# Patient Record
Sex: FEMALE | Race: WHITE | HISPANIC OR LATINO | Employment: FULL TIME | URBAN - METROPOLITAN AREA
[De-identification: names, ages, dates, MRNs, and addresses within clinical notes are randomized per-mention and may not be internally consistent; named-entity substitution may affect disease eponyms.]

---

## 2017-01-03 ENCOUNTER — ALLSCRIPTS OFFICE VISIT (OUTPATIENT)
Dept: OTHER | Facility: OTHER | Age: 32
End: 2017-01-03

## 2017-01-18 ENCOUNTER — ALLSCRIPTS OFFICE VISIT (OUTPATIENT)
Dept: OTHER | Facility: OTHER | Age: 32
End: 2017-01-18

## 2017-01-31 ENCOUNTER — TRANSCRIBE ORDERS (OUTPATIENT)
Dept: ADMINISTRATIVE | Facility: HOSPITAL | Age: 32
End: 2017-01-31

## 2017-01-31 ENCOUNTER — ALLSCRIPTS OFFICE VISIT (OUTPATIENT)
Dept: OTHER | Facility: OTHER | Age: 32
End: 2017-01-31

## 2017-01-31 DIAGNOSIS — G93.5 COMPRESSION OF BRAIN (HCC): Primary | ICD-10-CM

## 2017-01-31 DIAGNOSIS — Z86.69 PERSONAL HISTORY OF OTHER DISEASES OF THE NERVOUS SYSTEM AND SENSE ORGANS: ICD-10-CM

## 2017-05-02 ENCOUNTER — HOSPITAL ENCOUNTER (OUTPATIENT)
Dept: RADIOLOGY | Facility: HOSPITAL | Age: 32
Discharge: HOME | End: 2017-05-02
Attending: NEUROLOGICAL SURGERY
Payer: COMMERCIAL

## 2017-05-02 DIAGNOSIS — Z86.69 PERSONAL HISTORY OF OTHER DISEASES OF THE NERVOUS SYSTEM AND SENSE ORGANS: ICD-10-CM

## 2017-05-02 PROCEDURE — 72141 MRI NECK SPINE W/O DYE: CPT

## 2017-05-02 PROCEDURE — 70551 MRI BRAIN STEM W/O DYE: CPT

## 2017-05-04 ENCOUNTER — TRANSCRIBE ORDERS (OUTPATIENT)
Dept: ADMINISTRATIVE | Facility: HOSPITAL | Age: 32
End: 2017-05-04

## 2017-05-04 ENCOUNTER — ALLSCRIPTS OFFICE VISIT (OUTPATIENT)
Dept: OTHER | Facility: OTHER | Age: 32
End: 2017-05-04

## 2017-05-04 DIAGNOSIS — G95.0 SYRINGOMYELIA AND SYRINGOBULBIA (HCC): Primary | ICD-10-CM

## 2017-05-04 DIAGNOSIS — G95.0 SYRINGOMYELIA AND SYRINGOBULBIA (HCC): ICD-10-CM

## 2017-07-27 ENCOUNTER — TRANSCRIBE ORDERS (OUTPATIENT)
Dept: ADMINISTRATIVE | Facility: HOSPITAL | Age: 32
End: 2017-07-27

## 2017-07-27 DIAGNOSIS — G95.0 SYRINGOMYELIA AND SYRINGOBULBIA (HCC): ICD-10-CM

## 2017-07-27 DIAGNOSIS — Z00.00 ENCOUNTER FOR GENERAL ADULT MEDICAL EXAMINATION WITHOUT ABNORMAL FINDINGS: ICD-10-CM

## 2017-07-27 DIAGNOSIS — H53.9 VISUAL DISTURBANCE: ICD-10-CM

## 2017-07-27 DIAGNOSIS — R20.9 DISTURBANCE OF SKIN SENSATION: ICD-10-CM

## 2017-07-27 DIAGNOSIS — R26.9 ABNORMALITY OF GAIT AND MOBILITY: ICD-10-CM

## 2017-07-27 DIAGNOSIS — Z00.00 ROUTINE GENERAL MEDICAL EXAMINATION AT A HEALTH CARE FACILITY: Primary | ICD-10-CM

## 2017-07-27 DIAGNOSIS — G93.5 COMPRESSION OF BRAIN (HCC): ICD-10-CM

## 2017-07-27 DIAGNOSIS — M54.2 CERVICALGIA: ICD-10-CM

## 2017-07-27 DIAGNOSIS — M54.9 DORSALGIA: ICD-10-CM

## 2017-08-02 ENCOUNTER — HOSPITAL ENCOUNTER (OUTPATIENT)
Dept: RADIOLOGY | Facility: HOSPITAL | Age: 32
Discharge: HOME | End: 2017-08-02
Payer: COMMERCIAL

## 2017-08-02 DIAGNOSIS — M54.2 CERVICALGIA: ICD-10-CM

## 2017-08-02 DIAGNOSIS — Z00.00 ROUTINE GENERAL MEDICAL EXAMINATION AT A HEALTH CARE FACILITY: ICD-10-CM

## 2017-08-02 PROCEDURE — 72040 X-RAY EXAM NECK SPINE 2-3 VW: CPT

## 2017-08-02 PROCEDURE — 70450 CT HEAD/BRAIN W/O DYE: CPT

## 2017-08-09 ENCOUNTER — ALLSCRIPTS OFFICE VISIT (OUTPATIENT)
Dept: OTHER | Facility: OTHER | Age: 32
End: 2017-08-09

## 2017-08-09 ENCOUNTER — TRANSCRIBE ORDERS (OUTPATIENT)
Dept: ADMINISTRATIVE | Facility: HOSPITAL | Age: 32
End: 2017-08-09

## 2017-08-09 DIAGNOSIS — G95.0 SYRINGOMYELIA AND SYRINGOBULBIA (HCC): Primary | ICD-10-CM

## 2017-08-09 DIAGNOSIS — G95.0 SYRINGOMYELIA AND SYRINGOBULBIA (HCC): ICD-10-CM

## 2017-08-09 DIAGNOSIS — G93.5 COMPRESSION OF BRAIN (HCC): Primary | ICD-10-CM

## 2017-08-21 ENCOUNTER — HOSPITAL ENCOUNTER (OUTPATIENT)
Dept: RADIOLOGY | Facility: HOSPITAL | Age: 32
Discharge: HOME | End: 2017-08-21
Attending: NEUROLOGICAL SURGERY
Payer: COMMERCIAL

## 2017-08-29 ENCOUNTER — GENERIC CONVERSION - ENCOUNTER (OUTPATIENT)
Dept: OTHER | Facility: OTHER | Age: 32
End: 2017-08-29

## 2017-11-06 DIAGNOSIS — G93.5 COMPRESSION OF BRAIN (HCC): ICD-10-CM

## 2017-11-22 ENCOUNTER — HOSPITAL ENCOUNTER (OUTPATIENT)
Dept: RADIOLOGY | Facility: HOSPITAL | Age: 32
Discharge: HOME | End: 2017-11-22
Attending: NEUROLOGICAL SURGERY
Payer: COMMERCIAL

## 2017-11-22 DIAGNOSIS — G95.0 SYRINGOMYELIA AND SYRINGOBULBIA (HCC): ICD-10-CM

## 2017-11-22 DIAGNOSIS — G93.5 COMPRESSION OF BRAIN (HCC): ICD-10-CM

## 2017-11-22 PROCEDURE — 72141 MRI NECK SPINE W/O DYE: CPT

## 2017-11-22 PROCEDURE — 70551 MRI BRAIN STEM W/O DYE: CPT

## 2017-11-24 ENCOUNTER — HOSPITAL ENCOUNTER (OUTPATIENT)
Dept: RADIOLOGY | Facility: HOSPITAL | Age: 32
Discharge: HOME | End: 2017-11-24
Attending: NEUROLOGICAL SURGERY
Payer: COMMERCIAL

## 2017-11-24 DIAGNOSIS — G93.5 COMPRESSION OF BRAIN (HCC): ICD-10-CM

## 2017-11-24 PROCEDURE — 72148 MRI LUMBAR SPINE W/O DYE: CPT

## 2017-11-24 PROCEDURE — 72146 MRI CHEST SPINE W/O DYE: CPT

## 2017-12-14 ENCOUNTER — GENERIC CONVERSION - ENCOUNTER (OUTPATIENT)
Dept: OTHER | Facility: OTHER | Age: 32
End: 2017-12-14

## 2017-12-14 DIAGNOSIS — G95.0 SYRINGOMYELIA AND SYRINGOBULBIA (HCC): ICD-10-CM

## 2018-01-10 NOTE — PROGRESS NOTES
Assessment    1  Syringomyelia (336 0) (G95 0)    Plan    · Methocarbamol 500 MG Oral Tablet; Take one tablet every 6 hours as needed for  muscle spasms   Rx By: Jason Perea; Dispense: 0 Days ; #:60 Tablet; Refill: 2; For: Syringomyelia; CLAIRE = N; Verified Transmission to [id number] Last Updated By: System, SureScripts; 5/4/2017 3:54:33 PM   · MethylPREDNISolone 4 MG Oral Tablet Therapy Pack; as directed   Rx By: Jason Perea; Dispense: 0 Days ; #:1 Tablet Therapy Pack; Refill: 1; For: Syringomyelia; CLAIRE = N; Verified Transmission to [id number] Last Updated By: System, SureScripts; 5/4/2017 3:54:34 PM   · *1 - [facility]  cervical ROM and upper extremity  strengthening  Status: Active  Requested for: 85GTQ9340   Ordered; For: Syringomyelia; Ordered By: Jason Perea Performed:  Due: [id number]  Care Summary provided  : Yes   · Follow-up visit in 6 months Evaluation and Treatment  Follow-up  Status: Complete   Done: [id number]   Ordered; For: Syringomyelia; Ordered By: Jason Perea Performed:  Due: 86HIZ7312; Last Updated By: Domenica Rivera; 5/4/2017 4:00:03 PM   · * MRI CERVICAL SPINE WO CONTRAST; Status:Need Information - Financial  Authorization; Requested HPE:[id number];    Perform:[facility]; Order Comments:11/02/2017 10:30am angela escobedo; IOC:[id number]; Last Updated By:Shabana Mike; 5/4/2017 4:03:17 PM;Ordered; For:Syringomyelia; Ordered By:Melchor Laurent;    Discussion/Summary    This is a 70-year-old female who clinically reports that she is worse in spite of her imaging studies which are improved  Neurologically she is intact and has an improved overall neurological examination  I've written for physical therapy as well as a Medrol Dosepak and muscle relaxants which may be helpful symptomatically for her  No further neurosurgical intervention is anticipated  Follow-up MRIs and imaging studies were ordered        Chief Complaint  Patient presents for 3 month follow up w/MRI Brain & Cspine; s/p Suboccipital decompressive craniectomy, C1 laminectomy and partial C2 laminectomy and duraplasty with placement of fourth ventricular to subarachnoid shunt by Dr Huey Adam on 12/14/16  History of Present Illness  Patient is a 77-year-old female with a Chiari malformation and a large holocord syringomyelia  She has gone through a previous Chiari decompression without C1 laminectomy on 9/16/11 by Dr Jt Peck and imaging studies demonstrate an adequate decompression  In addition to this the syrinx had not improved and she continued to be symptomatic via pain of this  It Is for these reasons that we recommended surgical intervention  12/14/16 - Patient underwent a Suboccipital decompressive craniectomy, C1 laminectomy and partial C2 laminectomy and duraplasty with placement of fourth ventricular to subarachnoid shunt by Dr Huey Adam  12/27/16 - One week status post suboccipital decompressive craniectomy, C1 laminectomy and partial C2 laminectomy, and duraplasty with placement of fourth ventricle to subarachnoid shunt  Patient returned with CT head 12/27/16, this was carefully reviewed in detail by Dr Sterling Carlisle and no ifeanyi abnormalities are identified  Her incision was oversewn and was placed on antibiiotics  12/29/16 - She reported her incision remained dry since we did the oversewn and reported that her positional headaches had become improved  1/3/17 - She returned with complains of swelling and pain at her incision, the right side, denies drainage, redness, or incisional pain  Also had complaint of progressively worsening tremors of the upper extremities  Taper gabapentin, current dose 200 mg 3 times daily, taper 200 mg twice daily for 4 days, then increased to 200 mg once daily Ã4 days then discontinue      1/18/17 - Patient seen 4 weeks post-op for removal of running 3-0 nylon suture, suture was used postoperatively to oversew superior one third of posterior cervical incision  Well-healed, sutures removed  She continues to take tramadol 50 mg one tablet typically at bedtime occasionally second tablet during the day, refill was provided one tablet every 8 hours as needed #40     1/31/17 - Patient seen for 6 weeks POV  Patient was doing well and continued to improve although she still had some symptoms  Dr Iman Metcalf recommended a slow return to normal activities including work (not more than 4hrs/day)  Recommended PT to help with the transition back to more normal activities  Continue follow up w/imaging to ensure the syrinx is getting smaller will be important in setting of a syringomyelia  Today, patient complaints of constant headaches, ringing in ears, constant neck pain, constant back pain, numbness/tingling in hands and legs, occasional weakness of arms/legs, and imbalance  Review of Systems    Constitutional: No fever, no chills, feels well, no tiredness, no recent weight gain or weight loss  Eyes: No complaints of eye pain, no red eyes, no eyesight problems, no discharge, no dry eyes, no itching of eyes  ENT: tinnitus, but as noted in HPI  Cardiovascular: No complaints of slow heart rate, no fast heart rate, no chest pain, no palpitations, no leg claudication, no lower extremity edema  Respiratory: No complaints of shortness of breath, no wheezing, no cough, no SOB on exertion, no orthopnea, no PND  Gastrointestinal: No complaints of abdominal pain, no constipation, no nausea or vomiting, no diarrhea, no bloody stools  Genitourinary: No complaints of dysuria, no incontinence, no pelvic pain, no dysmenorrhea, no vaginal discharge or bleeding  Musculoskeletal: No complaints of arthralgias, no myalgias, no joint swelling or stiffness, no limb pain or swelling  Integumentary: No complaints of skin rash or lesions, no itching, no skin wounds, no breast pain or lump     Neurological: headache, numbness, tingling and limb weakness, but as noted in HPI, no dizziness and no difficulty walking  Psychiatric: Not suicidal, no sleep disturbance, no anxiety or depression, no change in personality, no emotional problems  Endocrine: No complaints of proptosis, no hot flashes, no muscle weakness, no deepening of the voice, no feelings of weakness  Hematologic/Lymphatic: No complaints of swollen glands, no swollen glands in the neck, does not bleed easily, does not bruise easily  ROS reviewed  Active Problems    1  Acute cystitis without hematuria (595 0) (N30 00)   2  Acute intractable headache, unspecified headache type (784 0) (R51)   3  Cervicalgia (723 1) (M54 2)   4  Chiari I malformation (348 4) (G93 5)   5  Encounter for postoperative care (V58 49) (Z48 89)   6  History of cervical spinal surgery (V45 89) (Z98 890)   7  History of cranial surgery (V45 89) (Z98 890)   8  History of syringomyelocele (V12 49) (Z86 69)   9  Syringomyelia (336 0) (G95 0)    Past Medical History    The active problems and past medical history were reviewed and updated today  Surgical History    1  History of Back Surgery   2  History of Suboccipital Decompress Medulla & Spinal Cord, Dural Graft    The surgical history was reviewed and updated today  Family History  Sister    1  Family history of cardiac disorder (V17 49) (Z82 49)  Maternal Grandmother    2  Family history of cardiac disorder (V17 49) (Z82 49)  Maternal Grandfather    3  Family history of diabetes mellitus (V18 0) (Z83 3)  Paternal Grandfather    4  Family history of diabetes mellitus (V18 0) (Z83 3)  Family History    5  Family history of cardiac disorder (V17 49) (Z82 49)   6  Family history of diabetes mellitus (V18 0) (Z83 3)    The family history was reviewed and updated today         Social History    · Completed college, associates degree   · Employed   ·    · Never a smoker   · Non-smoker (V49 89) (Z78 9)   · Rarely consumes alcohol (V49 89) (Z78 9)   · Two children  The social history was reviewed and updated today  Current Meds   1  No Reported Medications Recorded    The medication list was reviewed and updated today  Allergies    1  Cephalexin CAPS    Vitals  Vital Signs    Recorded: [id number] 02:39PM   Temperature 98 9 F   Heart Rate 62   Respiration 16   Systolic 115   Diastolic 53   Height 5 ft 3 in   Weight 115 lb    BMI Calculated 20 37   BSA Calculated 1 53   Pain Scale 8     Physical Exam   (Incision is well healed  power is 5/5 in the upper Extemities  Station and gait are normal)   Mental Status: Alert and Oriented x3  Memory is intact  Attentive  Speech is articulate and fluent  Knowledge and vocabulary consistent with education  Grossly nonfocal   Judgment and insight: Normal     Mood and affect: Abnormal   angry  Cranial Nerve Exam:  2nd cranial nerve: Normal with no noted deficit  7th cranial nerve: Face symmetrical at grimace and at rest   8th cranial nerve: Grossly intact to finger rub bilaterally  11th cranial nerve: Shoulder shrug equal bilaterally  pain to shoulder shrug  Motor System - Upper Extremities: Muscle strength: 5/5 bilaterally  Motor System - Lower Extremities: Muscle strength: 5/5 bilaterally  Results/Data  Diagnostic Studies Reviewed Neurosur[facility]:   I personally reviewed the in detail with the patient  MRI Review MRI of the cervical spine is carefully reviewed and compared with the preoperative study  This demonstrates that the syringomyelia cavity is smaller than it had been and there is signal around the cord  The cervical medullary junction has been decompressed  FloSeal study demonstrates normal triple spinal fluid flow around the base  Future Appointments    Date/Time Provider Specialty Site   11/07/2017 02:00 PM Owen Cheadle, M D   Neurosurgery [facility]     Signatures   Electronically signed by : KALE Jc ; May 25 2017  5:18PM EST (Author)

## 2018-01-11 NOTE — PROGRESS NOTES
Assessment   1  Syringomyelia (336 0) (G95 0)  2  Chiari I malformation (348 4) (G93 5)  3  Cervicalgia (723 1) (M54 2)  4  Neck muscle spasm (728 85) (M62 838)  5  Back pain (724 5) (M54 9)  6  Sensory disturbance (782 0) (R20 9)  7  Visual disturbances (368 9) (H53 9)    Plan    · Follow Up After Tests Complete Evaluation and Treatment  Follow-up sovl / snplx after  MRI Brain/Cervical/Thoracic/Lumbar spine ;  Dr Saida Diaz eval;  ophthalmology eval  (patient will arrange visit with her established ophthalmologist)  Status: Hold For -  Scheduling  Requested for: [id number]  Ordered; For: Back pain, Cervicalgia, Chiari I malformation, Gait disturbance, Neck   muscle spasm, PMH: Suboccipital Decompress Medulla & Spinal Cord, Dural Graft,   Syringomyelia, Visual disturbances; Ordered By: Barbra Birmingham  Performed:     Due: [id number]    · [facility]  [facility] Co-Management  Eval  and treat  Patient with history of Chiari Malformation w/ syrinx  She is s/p suboccipital  decompressive craniectomy, C1 laminectomy, and partial C2 laminectomy and  duraplasty with placement of 4th ventricular to subarachnoid shunt  Status:  Hold For - Scheduling  Requested for: [id number]  Ordered; For: Back pain, Cervicalgia, Neck muscle spasm;  Ordered By: Barbra Birmingham  Performed:   Due: 82WJP4814; Last Updated By: Peter Madden; 8/9/2017   11:42:03 AM  () Care Summary provided  : Yes    · * MRI THORACIC SPINE WO CONTRAST; Status:Need Information - Financial  Authorization; Requested for:[id number];   Perform:[facility] Radiology; MQY:[id number]; Last Updated By:Shabana Mike;   8/9/2017 12:12:33 PM;Ordered; For:Back pain, Sensory disturbance, PMH: Suboccipital   Decompress Medulla & Spinal Cord, Dural Graft, Syringomyelia; Ordered By:Larry Dominguez Adjutant;    · * MRI CERVICAL SPINE WO CONTRAST; Status:Need Information - Financial  Authorization;  Requested for:[id number]; Perform:[facility] Radiology; IDH:[id number]; Last Updated By:Shabana Mike;   8/9/2017 12:14:15 PM;Ordered; For:Cervicalgia, Chiari I malformation, Sensory   disturbance, PMH: Suboccipital Decompress Medulla & Spinal Cord, Dural Graft,   Syringomyelia; Ordered By:Edda Dominguez;    · * MRI BRAIN WO CONTRAST; Status:Need Information - Financial Authorization; Requested for:[id number];   Perform:[facility] Radiology; XNM:[id number]; Last Updated By:Shabana Mike;   8/9/2017 12:13:32 PM;Ordered; For:Chiari I malformation, PMH: Suboccipital   Decompress Medulla & Spinal Cord, Dural Graft, Visual disturbances; Ordered   By:Edda Dominguez;    · * MRI LUMBAR SPINE WO CONTRAST; Status:Need Information - Financial  Authorization; Requested for:[id number];   Perform:[facility] Radiology; QCF:[id number]; Last Updated By:Shabana Mike;   8/9/2017 12:13:03 PM;Ordered; For:Gait disturbance, Sensory disturbance, PMH:   Suboccipital Decompress Medulla & Spinal Cord, Dural Graft, Syringomyelia; Ordered By:Edda Dominguez;    Patient will arrange follow up with her established ophthalmologist for history of visual disturbances      Discussion/Summary    This is a 28year old female with c/o of neck pain, back pain, intermittent visual disturbances, intermittent sensory disturbances of upper extremities and lower extremities, and balance disturbances  She is s/p a suboccipital decompressive craniectomy, C1 laminectomy, and partial C2 laminectomy and duraplasty w/ placement of 4th ventricular to subarachnoid shunt performed by Dr Nighat Munoz on 12/14/16 for Chiari Malformation and syringomyelia  CT head and c-spine xray was reviewed with Ms Jarret Perez  CT head (8/2/17) demonstrates stable CT of the brain  Shunt extending into the 4th ventricle reported unchanged in position  Ventricles normal for age  X-ray of C-spine (8/2/17) reports no acute fracture or traumatic malalignment   There is mild reversal of the cervical lordosis  To further evaluate visual disturbances recommend she undergo MRI Brain and Ophthalmology evaluation  I offered to provide patient with referral for Ophthalmology evaluation but she reports she will contact her previously established ophthalmologist and arrange ophthalmology evaluation herself  In the setting of neck / back pain, sensory disturbance of upper extremities and lower extremities and c/o of balance difficulty and her history of Chiari Malformation and syringomyelia recommend she undergo MRI of spinal axis  This would allow to evaluate if there is any progression of syrinx  She does have presence of muscle spasm of her paracervical / trapezius musculature on examination which may be contributing to her neck pain  She reports she has tried an otc Aleve  She has tried Methocarbamol but she reports it has made her sleepy  She reports she completed about 4 weeks of home PT  Heat / cold minimally takes the edge off her pain  Recommend consultation with Dr Ashwin Bell of Physiatry to evaluate for additional conservative treatment options for her pain  She is advised to follow up in our office after completion of requested imaging studies and consultations  Ms Yo Lino is advised to contact our office or present to ER if she experiences neurological change  Ms Yo Lino expressed understanding and agreement  The patient was counseled regarding diagnostic results, instructions for management, impressions  The patient has the current Goals: Improve neck pain  The patent has the current Barriers: Muscle spasm  Chief Complaint  Patient presents for review CT head and c-spine xray      History of Present Illness  Ms Yo Lino is a 28year old female  who is s/p suboccipital decompressive craniectomy, C1 laminectomy, and partial C2 laminectomy and duraplasty w/ placement of 4th ventricular to subarachnoid shunt performed by Dr Alex Scott on 12/14/16 for Chiari Malformation and syringomyelia  She was last seen in the office with Dr Jordan Sebastian on 5/4/17 at which juncture she was placed on Methylprednisolone dose pack, Methocarbamol, referred for Physical therapy, and advised follow up in 6 months with MRI C-spine  She presents to office for re-evaluation pursuant to phone call to office  She has contacted the office w/ c/o of painful and stiff neck and blurry / hazy vision of eyes  She had requested CT head and C-spine xray completed  Ms Lillard Landau reports ongoing neck pain  Neck pain is located right paracervical / right trapezius region  She describes a constant pulling neck pain  She currently rates neck pain 8/10 on pain scale  She reports her neck pain is aggravated when laying down  She reports utilizing heat / cold minimally takes the edge off her pain  She reports occasional pain into her right shoulder and just below in proximal right arm  She has no right arm pain currently  She is unaware of a particular trigger for her arm pain  She reports intermittent numbness in both hands that she reports occurs randomly when working  She reports she had this prior to surgery as well  She denies any numbness or tingling in her upper extremities currently  She reports subjective stable weakness of her arms in interval since last visit  She reports constant pain in the middle of her back  She reports intermittent numbness / tingling of her legs distal to her knees  She is unaware of a particular triggering activity or position and resolves without intervention  She does not describe any focal weakness of her lower extremities  She reports she still has difficulty with her balance although notes this has been stable  She ambulated independent  She reports she completed the Methylprednisolone dose pack after last visit with some relief of her neck pain  She reports she takes the Methocarbamol infrequently as it makes her tired     She reports haven taken otc Aleve  She reports she completed approximately 4 weeks of home physical therapy that was reportedly directed by a friends mother who is a Physical therapist     She reports she experiences intermittent blurry vision (both eyes) describes as feeling like she has a haze or water in eyes  She reports this has occurred a few times over the past couple of weeks  The blurry vision last for about a minutes and resolves on it's own without intervention  She denies diplopia  She denies seeing an ophthalmologist recently  She reports she is due to arrange ophthalmology follow up  She denies bladder or bowel dysfunction  Review of Systems    Constitutional: feeling tired, but no fever, not feeling poorly, no recent weight gain, no chills and no recent weight loss  Eyes: no dryness of the eyes, eyes not red, no purulent discharge from the eyes and no itching of the eyes    The patient presents with complaints of occasional episodes of eye pain  The patient presents with complaints of eyesight problems, described as blurry vision  ENT: pressure in both ears, but no earache, no nosebleeds, no sore throat, no hearing loss, no nasal discharge and no hoarseness  Cardiovascular: No complaints of slow heart rate, no fast heart rate, no chest pain, no palpitations, no leg claudication, no lower extremity edema  Respiratory: No complaints of shortness of breath, no wheezing, no cough, no SOB on exertion, no orthopnea, no PND  Gastrointestinal: No complaints of abdominal pain, no constipation, no nausea or vomiting, no diarrhea, no bloody stools  Genitourinary: No complaints of dysuria, no incontinence, no pelvic pain, no dysmenorrhea, no vaginal discharge or bleeding  Musculoskeletal: No complaints of arthralgias, no myalgias, no joint swelling or stiffness, no limb pain or swelling  Integumentary: No complaints of skin rash or lesions, no itching, no skin wounds, no breast pain or lump     Neurological: headache, numbness (intermittent bilateral arm and leg numbness), tingling (intermittent bilateral arm and leg tingling) and limb weakness (left hand ), but no confusion, no convulsions, no fainting and no difficulty walking    The patient presents with complaints of occasional episodes of dizziness  Psychiatric: Not suicidal, no sleep disturbance, no anxiety or depression, no change in personality, no emotional problems  Endocrine: No complaints of proptosis, no hot flashes, no muscle weakness, no deepening of the voice, no feelings of weakness  Hematologic/Lymphatic: No complaints of swollen glands, no swollen glands in the neck, does not bleed easily, does not bruise easily  ROS reviewed  Active Problems   1  Acute cystitis without hematuria (595 0) (N30 00)  2  Acute intractable headache, unspecified headache type (784 0) (R51)  3  Cervicalgia (723 1) (M54 2)  4  Chiari I malformation (348 4) (G93 5)  5  Encounter for postoperative care (V58 49) (Z48 89)  6  History of cervical spinal surgery (V45 89) (Z98 890)  7  History of cranial surgery (V45 89) (Z98 890)  8  History of syringomyelocele (V12 49) (Z86 69)  9  Syringomyelia (336 0) (G95 0)    Past Medical History    The active problems and past medical history were reviewed and updated today  Surgical History   1  History of Back Surgery  2  History of Suboccipital Decompress Medulla & Spinal Cord, Dural Graft    The surgical history was reviewed and updated today  Family History  Sister   1  Family history of cardiac disorder (V17 49) (Z82 49)  Maternal Grandmother   2  Family history of cardiac disorder (V17 49) (Z82 49)  Maternal Grandfather   3  Family history of diabetes mellitus (V18 0) (Z83 3)  Paternal Grandfather   4  Family history of diabetes mellitus (V18 0) (Z83 3)  Family History   5  Family history of cardiac disorder (V17 49) (Z82 49)  6   Family history of diabetes mellitus (V18 0) (Z83 3)    The family history was reviewed and updated today  Social History    · Completed college, associates degree   · Employed   ·    · Never a smoker   · Non-smoker (V49 89) (Z78 9)   · Rarely consumes alcohol (V49 89) (Z78 9)   · Two children  The social history was reviewed and updated today  Current Meds  1  Methocarbamol 500 MG Oral Tablet; Take one tablet every 6 hours as needed for muscle   spasms; Therapy: 62CAJ9574 to (Last PW:92TUQ7292)  Requested for: 87XQS7415 Ordered    The medication list was reviewed and updated today  Allergies   1  Cephalexin CAPS    Vitals  Vital Signs    Recorded: 09Aug2017 10:10AM   Temperature 98 F, Tympanic   Heart Rate 62   Respiration 16   Systolic 886, LUE, Sitting   Diastolic 60, LUE, Sitting   Height 5 ft 3 in   Weight 115 lb    BMI Calculated 20 37   BSA Calculated 1 53     Physical Exam     Constitutional Patient appears healthy and well developed  No signs of acute distress present  Musculo: Spine   Spine:    Cervical Spine examination demonstrates Cervical Spine: Tenderness: level inferior cervical spine, left paraspinal, right paraspinal, left trapezius muscle and right trapezius muscle  Palpatory findings include bilateral muscle spasms  (Slight reversal of cervical lordosis)  Skin Mature suboccipital / posterior cervical scar  Neurologic - Mental Status: Alert and Oriented x3  Speech is articulated and fluent  Grossly nonfocal     Cranial Nerve Exam:  2nd cranial nerve: Visual field was full to confrontation  (PERRLA)   3rd, 4th, and 6th cranial nerves: Normal with no deficit  5th CN: Sensation to LT intact b/l V1-V3  Masseter intact b/l  7th cranial nerve: Face symmetrical at grimace and at rest  8th cranial nerve: Grossly intact to finger rub bilaterally  9th and 10th cranial nerves: Uvula is midline  11th cranial nerve: Shoulder shrug equal bilaterally  12th cranial nerve: Tongue mideline, no atrophy present    Motor System - Upper Extremities: Normal to inspection and palpation  Strength: Deltoids 5/5 bilaterally  Biceps 5/5 bilaterally  Triceps 5/5 bilaterally  Extensor carpi radials is 5/5 bilaterally  Extensor digitorum 5/5 bilaterally  Intrinsic 5/5 bilaterally   5/5 bilaterally  Motor System - Lower Extremities: Normal to inspection and palpation  Strength: iliopsoas 5/5 bilaterally  Quadriceps 5/5 bilaterally  Hamstrings 5/5 bilaterally  Gastrocnemius 5/5 bilaterally  Anterior tibialis 5/5 bilaterally  EHL 5/5 bilaterally  Reflexes: Biceps +2 bilaterally  Brachioradialis +1-2 bilaterally  Triceps +1 bilaterally  Patellar left +2 and right +1-2  Achilles +2 bilaterally  No ankle clonus bilaterally  Toes downgoing bilaterally on Babinski  Coordination: No pronator drift  Finger to nose intact bilaterally  EMILE intact fingers and hands bilaterally  Heel down shin smooth / symmetric  Involuntary movements: None   Sensory: Patient reports sensation to pinprick is diminished of lateral left forearm and all fingers left hand compared to RUE  Sensation to pinprick intact of proximal LUE and medial left forearm  Sensation to pinprick intact of RUE  Sensation to pinprick intact of torso and lower extremities bilaterally  JPS and Vibratory sense intact bilateral thumbs and great toes  Gait and Station: Routine gait is normal  Independent  She has shaking of both legs when preforming tandem walking although was able to complete  Results/Data  Diagnostic Studies Reviewed Neurosur[facility]:   I personally reviewed the CT head and C-spine xray in detail with the patient  * XR SPINE CERVICAL 2 OR 3 VW INJURY [id number] 01:57PM Belita Jewels Order Number: [id number]   Performing Comments: Upright AP/LAT cervical spine     Test Name Result Flag Reference   XR SPINE CERVICAL 2 OR 3 VW (Report)     CERVICAL SPINE     INDICATION: Neck pain       COMPARISON: None     VIEWS: AP and lateral     IMAGES: 2     FINDINGS:   There is mild reversal of the cervical lordosis above C7  No evidence of fracture or subluxation  The intervertebral disc spaces are preserved  The prevertebral soft tissues are within normal limits  The lung apices are intact  IMPRESSION:     No acute fracture or traumatic subluxation  Mild reversal of the cervical lordosis  Workstation performed: [id number]     Signed by:   Ciara Marx MD   8/8/17     * CT HEAD WO CONTRAST [id number] 01:51PM Benigno Lopez Benry     Test Name Result Flag Reference   CT HEAD WO CONTRAST (Report)     CT BRAIN - WITHOUT CONTRAST     INDICATION: Right-sided neck swelling and stiffness  COMPARISON: 12/27/2016, CT  MRI dated 5/2/2017  TECHNIQUE: CT examination of the brain was performed  In addition to axial images, coronal reformatted images were created and submitted for interpretation  Radiation dose length product (DLP) for this visit: 958 1 mGy-cm   This examination, like all CT scans performed in the [facility], was performed utilizing techniques to minimize radiation dose exposure, including the use of iterative    reconstruction and automated exposure control  IMAGE QUALITY: Diagnostic  FINDINGS:      PARENCHYMA: Normal cerebral hemispheres  No hemorrhage  No mass, mass effect or midline shift  Brainstem and cerebellum demonstrate normal density  Normal basilar cisterns  There is a shunt catheter extending into the 4th ventricle from an inferior approach  Patient has undergone previous inferior occipital craniectomy within the midline  There is no parenchymal hemorrhage  No signs of acute infarction  VENTRICLES AND EXTRA-AXIAL SPACES: Normal for patient's age  VISUALIZED ORBITS AND PARANASAL SINUSES: Unremarkable  CALVARIUM AND EXTRACRANIAL SOFT TISSUES: See above description of suboccipital craniotomy  No extra-axial fluid collections  IMPRESSION:     Stable CT of the brain   Shunt extending into the 4th ventricle, unchanged in position  Workstation performed: [id number]     Signed by:   Massiel Reno DO   8/3/17     Future Appointments    Date/Time Provider Specialty Site   11/07/2017 02:00 PM Owen Cheadle, M D  Neurosurgery [facility] NEUROSURGICAL [facility]     Signatures   Electronically signed by : ISAIAH Reddy;  Aug 10 2017  6:46AM EST                       (Author)    Electronically signed by : KALE Jc ; Aug 10 2017  8:42AM EST                       (Author)

## 2018-01-11 NOTE — MISCELLANEOUS
Message   Recorded as Task   Date: 11/02/2016 10:06 AM, Created By: Denny Mckeon   Task Name: Miscellaneous   Assigned To: Bia Taylor   Regarding Patient: Beata Spencer, Status: Active   Comment:    Bia Taylor - 02 Nov 2016 10:06 AM     TASK CREATED  Pt was concerned that her upcoming surgery will be during her menstrual cycle  she is concerned that her surgery would be cancelled  Discussed with KDM and pt was reassured that this should have no impact on her surgery  Hygiene for this will be handled once she is in the hosp  She stated an understanding          Signatures   Electronically signed by : Juve Lipscomb, ; Nov 2 2016 10:06AM EST                       (Author)

## 2018-01-12 VITALS
SYSTOLIC BLOOD PRESSURE: 103 MMHG | HEIGHT: 63 IN | TEMPERATURE: 98.2 F | BODY MASS INDEX: 20.02 KG/M2 | RESPIRATION RATE: 14 BRPM | DIASTOLIC BLOOD PRESSURE: 60 MMHG | HEART RATE: 105 BPM | WEIGHT: 113 LBS

## 2018-01-13 VITALS
SYSTOLIC BLOOD PRESSURE: 101 MMHG | DIASTOLIC BLOOD PRESSURE: 53 MMHG | BODY MASS INDEX: 20.38 KG/M2 | HEIGHT: 63 IN | TEMPERATURE: 98.9 F | RESPIRATION RATE: 16 BRPM | WEIGHT: 115 LBS | HEART RATE: 62 BPM

## 2018-01-13 VITALS
TEMPERATURE: 98.9 F | BODY MASS INDEX: 20.2 KG/M2 | HEIGHT: 63 IN | DIASTOLIC BLOOD PRESSURE: 80 MMHG | RESPIRATION RATE: 16 BRPM | HEART RATE: 109 BPM | WEIGHT: 114 LBS | SYSTOLIC BLOOD PRESSURE: 105 MMHG

## 2018-01-13 NOTE — MISCELLANEOUS
Message  11/16/16; 12:45 PM, spoke with patient by phone, reported to her that her urinalysis was abnormal and had findings consistent with cystitis  On questioning her she does admit to frequency urination recently she denied burning urgency and hesitancy  She reports 2 episodes of UTI in the last one year  Advised her that a prescription for Cipro times 5 days would be called to her pharmacy ([facility]) she should start that today, in addition suggested she have a repeat urinalysis in approximately 10 days after the antibiotic completed she expressed understanding and agreement with these instructions and agreed to proceed  Plan  Acute cystitis without hematuria    · Ciprofloxacin HCl - 250 MG Oral Tablet; TAKE 1 TABLET EVERY 12 HOURS DAILY   · (1) URINALYSIS w URINE C/S REFLEX (will reflex a microscopy if leukocytes, occult  blood, or nitrites are not within normal limits); Status:Active;  Requested for:10Mlk1694;     Signatures   Electronically signed by : Taylor Rehman, [facility]; Nov 16 2016 12:47PM EST                       (Author)

## 2018-01-13 NOTE — MISCELLANEOUS
Message  S/w pt scheduled for surgery 12/14/16 on telephone  Verified allergies and went over pre-op instructions, including hibiclens bathing and NPO status  Pt currently denies taking any blood thinning medications  Answered any questions she may have had at this time  Patient asked if it was okay to dye her hair today  I advised against it and told her to wait until after surgery once she is cleared by the surgeon  She verbalized understanding  Active Problems    1  Acute cystitis without hematuria (595 0) (N30 00)   2  Cervicalgia (723 1) (M54 2)   3  Chiari I malformation (348 4) (G93 5)   4  History of cervical spinal surgery (V45 89) (Z98 890)   5  History of cranial surgery (V45 89) (Z98 890)   6  Syringomyelia (336 0) (G95 0)    Allergies    1   No Known Drug Allergies    Signatures   Electronically signed by : Clare Siegel RN; Dec 13 2016 11:33AM EST                       (Author)

## 2018-01-14 VITALS
BODY MASS INDEX: 20.38 KG/M2 | WEIGHT: 115 LBS | HEIGHT: 63 IN | DIASTOLIC BLOOD PRESSURE: 60 MMHG | SYSTOLIC BLOOD PRESSURE: 102 MMHG | RESPIRATION RATE: 16 BRPM | HEART RATE: 62 BPM | TEMPERATURE: 98 F

## 2018-01-15 NOTE — PROGRESS NOTES
Assessment    1  Chiari I malformation (348 4) (G93 5)   2  History of syringomyelocele (V12 49) (Z86 69)    Plan  History of syringomyelocele    · *1 - [facility]  Consult re  cervical range of motion and  strengthening - very slow progression  Status: Active  Requested for: [id number]   Ordered; For: History of syringomyelocele; Ordered By: Pilar Bergeron Performed:  Due: 04ATN0936  Care Summary provided  : Yes   · * MRI BRAIN WO CONTRAST; Status:Need Information - Financial Authorization; Requested MMO:[id number];    Perform:[facility]; PWS:[id number]; Ordered;  For:History of syringomyelocele; Ordered By:Garry Laurent;   · * MRI CERVICAL SPINE WO CONTRAST; Status:Need Information - Financial  Authorization; Requested DDN:[id number];    Perform:[facility]; DQK:[id number]; Ordered;  For:History of syringomyelocele; Ordered By:Garry Laurent;   · Follow-up visit in 3 months Evaluation and Treatment  Follow-up  Status: Hold For -  Scheduling  Requested for: [id number]   Ordered; For: History of syringomyelocele; Ordered By: Pilar Bergeron Performed:  Due: 34TUS3364    Discussion/Summary    59-year-old female status post revision Chiari decompression and image of a syrinx    She in general is doing very well and continues to improve although she still has some symptoms  I've recommended that she slowly return to Normal activities including working not more than 4 hours a day  Physical therapy will help in this transition back to more normal activities  Continued follow-up with imaging studies to ensure that the syrinx is getting smaller is critically important in the setting of a syringomyelia        Chief Complaint  Patient presents 6 weeks post-op s/p Suboccipital decompressive craniectomy, C1 laminectomy and partial C2 laminectomy and duraplasty with placement of fourth ventricular to subarachnoid shunt by Dr De Guzman Session on 12/14/16      Post-Op  Post-Op Crani-Brain: Karl Chan is status post  Patient presents 6 weeks post-op s/p Suboccipital decompressive craniectomy, C1 laminectomy and partial C2 laminectomy and duraplasty with placement of fourth ventricular to subarachnoid shunt  History of Present Illness: Patient is a 19-year-old female with a Chiari malformation and a large holocord syringomyelia  She has gone through a previous Chiari decompression without C1 laminectomy imaging studies demonstrate an adequate decompression  In addition to this the syrinx had not improved and she continued to be symptomatic via pain of this  It Is for these reasons that I've recommended the following surgical intervention: Repeat suboccipital craniectomy and C1 laminectomy and duraplasty and placement of fourth ventricular-subarachnoid shunt  12/27/16 - One week status post suboccipital decompressive craniectomy, C1 laminectomy and partial C2 laminectomy, and duraplasty with placement of fourth ventricle to subarachnoid shunt  Patient returned with CT head 12/27/16, this was carefully reviewed in detail by Dr Sofía Medel and no ifeanyi abnormalities are identified  Her incision was oversewn and was placed on antibiiotics  12/29/16 - She reported her incision remained dry since we did the oversewn and reported that her positional headaches had become improved  1/3/17 - She returned with complains of swelling and pain at her incision, the right side, denies drainage, redness, or incisional pain  Also had complaint of progressively worsening tremors of the upper extremities  Taper gabapentin, current dose 200 mg 3 times daily, taper 200 mg twice daily for 4 days, then increased to 200 mg once daily Ã4 days then discontinue  1/18/17 - Patient seen 4 weeks post-op for removal of running 3-0 nylon suture, suture was used postoperatively to oversew superior one third of posterior cervical incision  Well-healed, sutures removed   She continues to take tramadol 50 mg one tablet typically at bedtime occasionally second tablet during the day, refill was provided one tablet every 8 hours as needed #40  The patient reports vertigo, upper extremity weakness, lower extremity weakness and ringing in left ear, hearing loss in left ear, nausea, numbness and tingling in hands and legs, weakness of upper and lower extremities, and imbalance  She does report that she continues to improve but very slowly  , but no speech disturbances, no visual complaints, no neck stiffness, no fever, no facial weakness, no cognitive difficulties, no wound drainage, no ataxia and no photophobia    The patient presents with complaints of occasional episodes of dizziness, described as lightheadedness and vertigo  The patient presents with complaints of entire head headache (never in the same area)   Physical Examination:   Test Results:   Assessment:   Plan:        Review of Systems    Constitutional: feeling poorly  Eyes: No complaints of eye pain, no red eyes, no eyesight problems, no discharge, no dry eyes, no itching of eyes  ENT: hearing loss and tinnitus, but as noted in HPI  Cardiovascular: No complaints of slow heart rate, no fast heart rate, no chest pain, no palpitations, no leg claudication, no lower extremity edema  Respiratory: No complaints of shortness of breath, no wheezing, no cough, no SOB on exertion, no orthopnea, no PND  Gastrointestinal: No complaints of abdominal pain, no constipation, no nausea or vomiting, no diarrhea, no bloody stools  Genitourinary: No complaints of dysuria, no incontinence, no pelvic pain, no dysmenorrhea, no vaginal discharge or bleeding  Musculoskeletal: No complaints of arthralgias, no myalgias, no joint swelling or stiffness, no limb pain or swelling  Integumentary: No complaints of skin rash or lesions, no itching, no skin wounds, no breast pain or lump     Neurological: headache, numbness, tingling, dizziness, limb weakness and difficulty walking, but as noted in HPI  Psychiatric: Not suicidal, no sleep disturbance, no anxiety or depression, no change in personality, no emotional problems  Endocrine: No complaints of proptosis, no hot flashes, no muscle weakness, no deepening of the voice, no feelings of weakness  Hematologic/Lymphatic: No complaints of swollen glands, no swollen glands in the neck, does not bleed easily, does not bruise easily  Active Problems    1  Acute cystitis without hematuria (595 0) (N30 00)   2  Acute intractable headache, unspecified headache type (784 0) (R51)   3  Cervicalgia (723 1) (M54 2)   4  Chiari I malformation (348 4) (G93 5)   5  Encounter for postoperative care (V58 49) (Z48 89)   6  History of cervical spinal surgery (V45 89) (Z98 890)   7  History of cranial surgery (V45 89) (Z98 890)   8  History of syringomyelocele (V12 49) (Z86 69)   9  Syringomyelia (336 0) (G95 0)    Social History    · Completed college, associates degree   · Employed   ·    · Never a smoker   · Non-smoker (V49 89) (Z78 9)   · Rarely consumes alcohol (V49 89) (Z78 9)   · Two children    Current Meds   1  TraMADol HCl - 50 MG Oral Tablet; TAKE 1 TABLET Every 8 hours PRN; Therapy: 22UVV6008 to (Evaluate:20Cmh2577); Last Rx:18Jan2017 Ordered    Allergies    1  Cephalexin CAPS    Vitals   Recorded: 19NDT9146 09:36AM   Temperature 97 9 F   Heart Rate 88   Respiration 16   Systolic 349   Diastolic 66   Height 5 ft 3 in   Weight 114 lb    BMI Calculated 20 19   BSA Calculated 1 52   Pain Scale 6     Physical Exam   (Incision is clean and dry and healing well  )   Mental Status: Alert and Oriented x3  Memory is intact  Attentive  Speech is articulate and fluent  Knowledge and vocabulary consistent with education    Grossly nonfocal   Judgment and insight: Normal   Mood and affect: Normal        Signatures   Electronically signed by : KALE Cox ; Jan 31 2017 10:39AM EST                       (Author)

## 2018-01-19 ENCOUNTER — GENERIC CONVERSION - ENCOUNTER (OUTPATIENT)
Dept: OTHER | Facility: OTHER | Age: 33
End: 2018-01-19

## 2018-01-22 VITALS
HEIGHT: 63 IN | TEMPERATURE: 97.9 F | BODY MASS INDEX: 20.2 KG/M2 | RESPIRATION RATE: 16 BRPM | SYSTOLIC BLOOD PRESSURE: 118 MMHG | DIASTOLIC BLOOD PRESSURE: 66 MMHG | WEIGHT: 114 LBS | HEART RATE: 88 BPM

## 2018-01-23 NOTE — MISCELLANEOUS
Message  Return to work or school:   Tio Patrick is under my professional care  She was seen in my office on 12/14/17     She is able to work with limitations (light duty)  Weight Bearing Status: Weight-Bearing As Tolerated           Signatures   Electronically signed by : Maria Isabel Hill, ; Jan 19 2018  3:53PM EST                       (Author)

## 2018-01-24 VITALS
WEIGHT: 112.25 LBS | BODY MASS INDEX: 19.89 KG/M2 | HEIGHT: 63 IN | TEMPERATURE: 98.9 F | SYSTOLIC BLOOD PRESSURE: 102 MMHG | RESPIRATION RATE: 16 BRPM | HEART RATE: 60 BPM | DIASTOLIC BLOOD PRESSURE: 58 MMHG

## 2018-10-15 ENCOUNTER — OFFICE VISIT (OUTPATIENT)
Dept: GYNECOLOGY | Facility: CLINIC | Age: 33
End: 2018-10-15
Payer: COMMERCIAL

## 2018-10-15 VITALS
SYSTOLIC BLOOD PRESSURE: 102 MMHG | WEIGHT: 120.2 LBS | HEIGHT: 63 IN | BODY MASS INDEX: 21.3 KG/M2 | DIASTOLIC BLOOD PRESSURE: 58 MMHG

## 2018-10-15 DIAGNOSIS — Z86.69 HISTORY OF CHIARI MALFORMATION: ICD-10-CM

## 2018-10-15 DIAGNOSIS — Z11.3 ROUTINE SCREENING FOR STI (SEXUALLY TRANSMITTED INFECTION): ICD-10-CM

## 2018-10-15 DIAGNOSIS — N89.8 VAGINAL DISCHARGE: ICD-10-CM

## 2018-10-15 DIAGNOSIS — Z01.419 ENCOUNTER FOR ANNUAL ROUTINE GYNECOLOGICAL EXAMINATION: Primary | ICD-10-CM

## 2018-10-15 DIAGNOSIS — N92.6 IRREGULAR MENSES: ICD-10-CM

## 2018-10-15 PROCEDURE — S0610 ANNUAL GYNECOLOGICAL EXAMINA: HCPCS | Performed by: OBSTETRICS & GYNECOLOGY

## 2018-10-15 PROCEDURE — 87210 SMEAR WET MOUNT SALINE/INK: CPT | Performed by: OBSTETRICS & GYNECOLOGY

## 2018-10-15 PROCEDURE — 87591 N.GONORRHOEAE DNA AMP PROB: CPT | Performed by: OBSTETRICS & GYNECOLOGY

## 2018-10-15 PROCEDURE — 87491 CHLMYD TRACH DNA AMP PROBE: CPT | Performed by: OBSTETRICS & GYNECOLOGY

## 2018-10-15 PROCEDURE — G0145 SCR C/V CYTO,THINLAYER,RESCR: HCPCS | Performed by: PATHOLOGY

## 2018-10-15 PROCEDURE — 87624 HPV HI-RISK TYP POOLED RSLT: CPT | Performed by: OBSTETRICS & GYNECOLOGY

## 2018-10-15 PROCEDURE — G0124 SCREEN C/V THIN LAYER BY MD: HCPCS | Performed by: PATHOLOGY

## 2018-10-15 NOTE — PROGRESS NOTES
Assessment/Plan:  NGE  BCM- trying to conceive for 6 mos  Irregular cycles- rec menstrual calendar, OPK for 14 days x 3 mos, TSH, PRL, cervical cultures,  PNV's   Nipple Discharge-   Vaginal Discharge- nl wet mount  History of Chiari malformation- no contraindication to conception per neurologist   Co Testing q 5 yrs- Colposcopy PALLAVI 1/HR HPV non 16/18- re CoTest '19  RTO 1yr  SBE monthly  Exercise 3/wk  Calcium 1,000 mg/d with Vit D        Diagnoses and all orders for this visit:    Encounter for annual routine gynecological examination  -     Liquid-based pap, screening    Routine screening for STI (sexually transmitted infection)  -     Chlamydia/GC amplified DNA by PCR    Vaginal discharge  -     POCT wet mount    Irregular menses  -     TSH, 3rd generation; Future  -     Prolactin; Future    History of Chiari malformation              Subjective:        Patient ID: Alexia Cunha is a 35 y o  female  Ary is a former patient had not seen in several years  She has not seen a gynecologist either  She is  and in the middle of a divorce  She has been in a relationship for the past year  Her partner is 32  They have been trying to conceive for the past 6 months although coital frequency may be inadequate  Her cycles are slightly irregular  Example would be-  15th of the month, 15th , 21st, then 28th  The following portions of the patient's history were reviewed and updated as appropriate: She  has a past medical history of Chiari I malformation ([address] ) and UTI (urinary tract infection)    Patient Active Problem List    Diagnosis Date Noted    Encounter for annual routine gynecological examination 10/15/2018    Routine screening for STI (sexually transmitted infection) 10/15/2018    Vaginal discharge 10/15/2018    Irregular menses 10/15/2018    History of Chiari malformation 10/15/2018    Chiari malformation type I ([address] ) 12/14/2016    Syringomyelia ([address] ) 12/14/2016 PMH:  Menarche 11  Dysmenorrhea  G2, P2: SAVD '07 '10  Chiari Malformation '11- craniotomy with laminectomy- persistent symptoms, '16 repeated with subarachnoid shunt- symptoms persisted again  She  has a past surgical history that includes Back surgery; Brain surgery; and pr suboccipt decomp medulla/sp crd (N/A, 12/14/2016)  Her family history includes Asthma in her son; Diabetes in her paternal grandmother; Heart disease in her maternal grandmother; Heart failure in her sister; Hypertension in her father; No Known Problems in her mother  S Jessica postpartum cardiomyopathy, subsequent CHF  She  reports that she has never smoked  She has never used smokeless tobacco  She reports that she drinks alcohol  She reports that she does not use drugs    Partner is 32  Nickolas Fransisca and Madison Reynoso  No current outpatient prescriptions on file  No current facility-administered medications for this visit        Current Outpatient Prescriptions on File Prior to Visit   Medication Sig    [DISCONTINUED] acetaminophen (TYLENOL) 325 mg tablet Take 2 tablets by mouth every 6 (six) hours as needed for mild pain (Patient not taking: Reported on 10/15/2018 )    [DISCONTINUED] cyclobenzaprine (FLEXERIL) 5 mg tablet Take 1 tablet by mouth 3 (three) times a day as needed for muscle spasms (Patient not taking: Reported on 10/15/2018 )    [DISCONTINUED] gabapentin (NEURONTIN) 100 mg capsule Take 2 capsules by mouth 3 (three) times a day For nerve related pain (Patient not taking: Reported on 10/15/2018 )    [DISCONTINUED] methocarbamol (ROBAXIN) 500 mg tablet Take 1 tablet by mouth every 6 (six) hours For 1 week, then reduce to 1 tablet by mouth twice a day for 1 week, then discontinue (Patient not taking: Reported on 10/15/2018 )    [DISCONTINUED] traMADol (ULTRAM) 50 mg tablet Take 1 tablet by mouth every 6 (six) hours as needed for moderate pain or severe pain (Patient not taking: Reported on 10/15/2018 )     No current facility-administered medications on file prior to visit  She is allergic to sunscreens       Review of Systems   Constitutional: Negative for activity change, appetite change, fatigue and unexpected weight change  HENT: Positive for tinnitus  Eyes: Negative for visual disturbance  Respiratory: Negative for cough, chest tightness, shortness of breath and wheezing  Cardiovascular: Negative for chest pain, palpitations and leg swelling  Breast: Patient denies tenderness, masses, or erythema  There is a clear nipple discharge if the breasts are squeezed  That is done when she feels a tingling sensation within the breast    Gastrointestinal: Negative for abdominal distention, abdominal pain, blood in stool, constipation, diarrhea, nausea and vomiting  Endocrine: Negative for cold intolerance and heat intolerance  Genitourinary: Negative for decreased urine volume, difficulty urinating, dyspareunia, dysuria, frequency, hematuria, menstrual problem, pelvic pain, urgency, vaginal bleeding, vaginal discharge and vaginal pain  Musculoskeletal: Negative for arthralgias  Skin: Negative for rash  Neurological: Positive for headaches  Negative for weakness, light-headedness and numbness  Tinnitis, headaches and spotty vision were presenting symptoms of the Chiari malformation which was thought to possibly be migraine headaches  She was advised not to drive at night due to decreased peripheral vision and reaction time  She also has balance problems  Hematological: Does not bruise/bleed easily  Psychiatric/Behavioral: Negative for agitation, behavioral problems and sleep disturbance  The patient is not nervous/anxious            Objective:    Vitals:    10/15/18 1611   BP: 102/58   BP Location: Right arm   Patient Position: Sitting   Cuff Size: Standard   Weight: 54 5 kg (120 lb 3 2 oz)   Height: 5' 3" (1 6 m)            Physical Exam   Constitutional: She is oriented to person, place, and time  She appears well-developed and well-nourished  HENT:   Head: Normocephalic and atraumatic  Eyes: Pupils are equal, round, and reactive to light  Conjunctivae and EOM are normal    Neck: Normal range of motion  Neck supple  No tracheal deviation present  No thyromegaly present  Cardiovascular: Normal rate, regular rhythm and normal heart sounds  No murmur heard  Pulmonary/Chest: Effort normal and breath sounds normal  No respiratory distress  She has no wheezes  Right breast exhibits no inverted nipple, no mass, no nipple discharge, no skin change and no tenderness  Left breast exhibits no inverted nipple, no mass, no nipple discharge, no skin change and no tenderness  Breasts are symmetrical    Abdominal: Soft  Bowel sounds are normal  She exhibits no distension and no mass  There is no tenderness  Genitourinary: Uterus normal  Rectal exam shows no external hemorrhoid  No breast swelling, tenderness, discharge or bleeding  There is no rash, tenderness or lesion on the right labia  There is no rash, tenderness or lesion on the left labia  Uterus is not deviated, not enlarged and not tender  Cervix exhibits no motion tenderness and no discharge  Right adnexum displays no mass, no tenderness and no fullness  Left adnexum displays no mass, no tenderness and no fullness  Vaginal discharge found  Genitourinary Comments: White discharge  Mild cervical eversion  Contact bleeding with Pap smear  Uterus is retroverted and nontender  There are no adnexal masses  Wet mount- normal, sperm present with WBCs  Musculoskeletal: Normal range of motion  Neurological: She is alert and oriented to person, place, and time  Skin: Skin is warm and dry  Psychiatric: She has a normal mood and affect  Her behavior is normal  Judgment and thought content normal    Nursing note and vitals reviewed

## 2018-10-17 LAB
CHLAMYDIA DNA CVX QL NAA+PROBE: NORMAL
N GONORRHOEA DNA GENITAL QL NAA+PROBE: NORMAL

## 2018-10-19 LAB
HPV HR 12 DNA CVX QL NAA+PROBE: POSITIVE
HPV16 DNA CVX QL NAA+PROBE: NEGATIVE
HPV18 DNA CVX QL NAA+PROBE: NEGATIVE

## 2018-10-22 LAB
LAB AP GYN PRIMARY INTERPRETATION: NORMAL
Lab: NORMAL
PATH INTERP SPEC-IMP: NORMAL

## 2018-11-08 ENCOUNTER — PROCEDURE VISIT (OUTPATIENT)
Dept: GYNECOLOGY | Facility: CLINIC | Age: 33
End: 2018-11-08
Payer: COMMERCIAL

## 2018-11-08 VITALS
SYSTOLIC BLOOD PRESSURE: 102 MMHG | DIASTOLIC BLOOD PRESSURE: 60 MMHG | WEIGHT: 116.4 LBS | BODY MASS INDEX: 20.62 KG/M2 | HEART RATE: 69 BPM | HEIGHT: 63 IN

## 2018-11-08 DIAGNOSIS — R87.810 CERVICAL HIGH RISK HPV (HUMAN PAPILLOMAVIRUS) TEST POSITIVE: ICD-10-CM

## 2018-11-08 DIAGNOSIS — R87.612 PAP SMEAR ABNORMALITY OF CERVIX WITH LGSIL: Primary | ICD-10-CM

## 2018-11-08 PROCEDURE — 88305 TISSUE EXAM BY PATHOLOGIST: CPT | Performed by: PATHOLOGY

## 2018-11-08 PROCEDURE — 57454 BX/CURETT OF CERVIX W/SCOPE: CPT | Performed by: OBSTETRICS & GYNECOLOGY

## 2018-11-08 NOTE — PROGRESS NOTES
Colposcopy  Date/Time: 11/8/2018 3:59 PM  Performed by: Arliss Klinefelter  Authorized by: Arliss Klinefelter     Consent:     Consent obtained:  Verbal and written    Consent given by:  Patient    Procedural risks discussed:  Bleeding, failure rate and infection    Patient questions answered: yes      Patient agrees, verbalizes understanding, and wants to proceed: yes    Pre-procedure:     Pre-procedure timeout performed: yes      Premeds:  Naproxen sodium    Prepped with: acetic acid    Indication:     Indication:  LSIL  Procedure:     Procedure: Colposcopy w/ cervical biopsy and ECC      Under satisfactory analgesia the patient was prepped and draped in the dorsal lithotomy position: yes      Aurora speculum was placed in the vagina: yes      Under colposcopic examination the transition zone was seen in entirety: yes      Endocervix was curetted using a Kevorkian curette: yes      Cervical biopsy performed with a cervical biopsy punch: yes      Monsel's solution was applied: yes      Biopsy(s): yes      Location:  12, 2    Specimen to pathology: yes    Post-procedure:     Impression: Low grade cervical dysplasia    Comments:      Last time she had intercourse with her  was a year ago  History of an abnormal Pap smear when she was much younger  She states she had a colposcopy at age 12  And had cysts burned off  She has been in a relationship for approximately a year that is monogamous  Her discussion with him did not go well regarding the abnormal Pap smear and HPV  This is probably the 1st time she was screened for HPV  She believes her previous Pap smear was over 3 years ago  and we do not screen for HPV in the 25s  Colposcopy compatible with mild cervical dysplasia  HPV was fully discussed with the patient  She had done a significant amount research before today's visit

## 2018-11-12 NOTE — PROGRESS NOTES
PALLAVI 1- mild dysplasia at both biopsies  We do not treat this as 60% will resolve  A repeat Pap smear will be performed next year with Co HPV testing as well  If you would like a more thorough explanation please make an appointment to discuss this

## 2018-12-10 ENCOUNTER — HOSPITAL ENCOUNTER (OUTPATIENT)
Dept: RADIOLOGY | Facility: HOSPITAL | Age: 33
Discharge: HOME | End: 2018-12-10
Attending: NEUROLOGICAL SURGERY
Payer: COMMERCIAL

## 2018-12-10 DIAGNOSIS — G95.0 SYRINGOMYELIA AND SYRINGOBULBIA (HCC): ICD-10-CM

## 2018-12-10 PROCEDURE — 72141 MRI NECK SPINE W/O DYE: CPT

## 2018-12-10 PROCEDURE — 72146 MRI CHEST SPINE W/O DYE: CPT

## 2018-12-13 ENCOUNTER — OFFICE VISIT (OUTPATIENT)
Dept: NEUROSURGERY | Facility: CLINIC | Age: 33
End: 2018-12-13
Payer: COMMERCIAL

## 2018-12-13 VITALS
HEIGHT: 63 IN | TEMPERATURE: 98.3 F | WEIGHT: 115 LBS | DIASTOLIC BLOOD PRESSURE: 59 MMHG | RESPIRATION RATE: 16 BRPM | HEART RATE: 69 BPM | SYSTOLIC BLOOD PRESSURE: 110 MMHG | BODY MASS INDEX: 20.38 KG/M2

## 2018-12-13 DIAGNOSIS — G95.9 MYELOPATHY (HCC): ICD-10-CM

## 2018-12-13 DIAGNOSIS — G95.0 SYRINGOMYELIA (HCC): Primary | ICD-10-CM

## 2018-12-13 DIAGNOSIS — M50.20 HERNIATED DISC, CERVICAL: ICD-10-CM

## 2018-12-13 PROBLEM — M54.12 RADICULOPATHY, CERVICAL: Status: ACTIVE | Noted: 2018-12-13

## 2018-12-13 PROCEDURE — 99214 OFFICE O/P EST MOD 30 MIN: CPT | Performed by: NEUROLOGICAL SURGERY

## 2018-12-13 NOTE — PROGRESS NOTES
Neurosurgery Office Note  Mariana Hanson is a 35 y o  female    Type of Visit: Follow-up        Diagnoses and all orders for this visit:    Syringomyelia ([facility] [address] )  -     Case request operating room: Anterior cervical diskectomy and total disc arthroplasty C5-6; Standing  -     Case request operating room: Anterior cervical diskectomy and total disc arthroplasty C5-6    Herniated disc, cervical  -     Case request operating room: Anterior cervical diskectomy and total disc arthroplasty C5-6; Standing  -     Case request operating room: Anterior cervical diskectomy and total disc arthroplasty C5-6    Myelopathy ([facility] [address] )  -     Case request operating room: Anterior cervical diskectomy and total disc arthroplasty C5-6; Standing  -     Case request operating room: Anterior cervical diskectomy and total disc arthroplasty C5-6    Other orders  -     Diet NPO; Sips with meds; Standing  -     Void on call to OR; Standing  -     Insert peripheral IV; Standing          DISCUSSION SUMMARY  This is a 35 yr old female with a history of syringomyelia secondary to a chiari malformation  Her pain was at a baseline until two ago when it worsened for no apparent reason  The routine schedule MRI demonstrated a large herniated cervical disc with cord contact  In the setting of ongoing syringomyelia and worsening pain I recommended surgical intervention for decompression and the placement of an artificial disc  The risks, benefits and complications of surgery were explained in detail to Ary Guillory  including hemorrhage, infection, CSF leakage, wound problems, pain, weakness, numbness, dysesthesiae, paralysis, coma, and death  Also, the possibility  of further surgery being required was emphasized  Other potential medical complications were outlined, including deep venous thrombosis, pulmonary embolism, pneumonia, urinary tract infection, myocardial infarction,  and stroke       The need for physical therapy, occupational therapy, and rehabilitation was also mentioned  The alternatives to surgery were discussed  Kailey Polo asked relevant questions and asked that we proceed with arrangements for surgery  CHIEF COMPLAINT  Patient presents for 1 year F/U with MRI'S [facility] Spine regarding history of Syringomyelia    SX INFO  12/14/16 (DKO) Suboccipital decompressive craniectomy, C1 laminectomy and partial C2 laminectomy and duraplasty with placement of fourth ventricular to subarachnoid shunt    TREATMENT HX  She has gone through a previous Chiari decompression without C1 laminectomy on 9/16/11 by Dr Ayleen Menjivar and imaging studies demonstrate an adequate decompression  HISTORY OF PRESENT ILLNESS  Reports for a routine follow up to check on the syrinx  She does report 2 weeks of worsening pain in her neck and radiating into both arms  She has new numbness and tingling in her hands and arms bilaterally  She denies bowel or bladder difficulties  She denies dropping things although she believes her arms are weaker than they were  She denies headaches  Denies any traumatic events leading to her symptoms    REVIEW OF SYSTEMS  Review of Systems   Constitutional: Negative  HENT: Negative  Eyes: Negative  Respiratory: Negative  Cardiovascular: Negative  Gastrointestinal: Negative  Endocrine: Negative  Genitourinary: Negative  Musculoskeletal: Positive for neck pain (bilateral into shoulders)  Skin: Negative  Allergic/Immunologic: Negative  Neurological: Positive for numbness (and tingling in both arms occasionally)  Hematological: Negative  Psychiatric/Behavioral: Negative  All other systems reviewed and are negative        The patient's ROS was reviewed by MD BROOKS reviewed the ROS    Active Ambulatory Problems     Diagnosis Date Noted    Chiari malformation type I ([facility] [address] ) 12/14/2016    Syringomyelia ([facility] [address] ) 12/14/2016    Encounter for annual routine gynecological examination 10/15/2018    Routine screening for STI (sexually transmitted infection) 10/15/2018    Vaginal discharge 10/15/2018    Irregular menses 10/15/2018    History of Chiari malformation 10/15/2018    Pap smear abnormality of cervix with LGSIL 11/08/2018    Cervical high risk HPV (human papillomavirus) test positive 11/08/2018    Herniated disc, cervical 12/13/2018    [facility] 12/13/2018    Radiculopathy, cervical 12/13/2018     Resolved Ambulatory Problems     Diagnosis Date Noted    No Resolved Ambulatory Problems     Past Medical History:   Diagnosis Date    Chiari I malformation ([facility] )     UTI (urinary tract infection)        Past Surgical History:   Procedure Laterality Date    BACK SURGERY      BRAIN SURGERY      LA SUBOCCIPT DECOMP MEDULLA/SP CRD N/A 12/14/2016    Procedure: REPEAT SUBOCCIPITAL CRANIECTOMY; C1 LAMINECTOMY AND DURAPLASTY; PLACEMENT OF 4TH VENTRICULAR-SUBARACHNOID SHUNT; IMPULSE MONITORING;  Surgeon: Catia Hoffmann MD;  Location: [facility];  Service: Neurosurgery       History   Smoking Status    Never Smoker   Smokeless Tobacco    Never Used       History   Alcohol Use    Yes     Comment: socially       History   Drug Use No       Vitals:    12/13/18 1312   BP: 110/59   BP Location: Right arm   Patient Position: Sitting   Cuff Size: Adult   Pulse: 69   Resp: 16   Temp: 98 3 °F (36 8 °C)   TempSrc: Tympanic   Weight: 52 2 kg (115 lb)   Height: 5' 3" (1 6 m)       No current outpatient prescriptions on file  The following portions of the patient's history were reviewed by MD and updated by MA as appropriate: allergies, current medications, past family history, past medical history, past social history, past surgical history and problem list          Physical Exam   Constitutional: She is oriented to person, place, and time  She appears well-developed  HENT:   Head: Normocephalic  Eyes: Pupils are equal, round, and reactive to light     Neck:       Neck stiffness and reduced ROM with increased pain    Musculoskeletal: Normal range of motion  Lymphadenopathy:     She has no cervical adenopathy  Neurological: She is alert and oriented to person, place, and time  A sensory deficit (decreased FT and PP in the C6 and C7 dermatomes bilaterally in the upper extremeties) is present  No cranial nerve deficit  Coordination normal  GCS eye subscore is 4  GCS verbal subscore is 5  GCS motor subscore is 6  Reflex Scores:       Tricep reflexes are 0 on the right side and 0 on the left side  Bicep reflexes are 0 on the right side and 0 on the left side  Brachioradialis reflexes are 1+ on the right side and 1+ on the left side  Patellar reflexes are 3+ on the right side and 3+ on the left side  Achilles reflexes are 3+ on the right side and 3+ on the left side  Tandem gait is intact  Romberg is positive   Psychiatric: Her speech is normal and behavior is normal  Thought content normal  Her mood appears not anxious  Good insight into her issues         RESULTS/DATA  MRI of the cervical spine and thoracic spine are carefully reviewed and compared with the previous studies  There 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

## 2018-12-13 NOTE — LETTER
December 16, 2018     [place], [address] [facility] [address]    Patient: Elsy Queen   YOB: 1985   Date of Visit: 12/13/2018       Dear Dr Marley Mccarthy:    Thank you for referring Elsy Queen to me for evaluation  Below are my notes for this consultation  If you have questions, please do not hesitate to call me  I look forward to following your patient along with you           Sincerely,        Alfonzo Carsno MD        CC: No Recipients

## 2018-12-26 ENCOUNTER — ANESTHESIA EVENT (OUTPATIENT)
Dept: PERIOP | Facility: HOSPITAL | Age: 33
End: 2018-12-26
Payer: COMMERCIAL

## 2018-12-26 NOTE — PRE-PROCEDURE INSTRUCTIONS
No outpatient prescriptions have been marked as taking for the [facility]  Spoke to pt  No reported medications  As of 12 26 18 pt instructed on tylenol only   Am DOS no meds  Showering instructions given by office  All instructions verbally understood by patient  No further questions

## 2018-12-27 ENCOUNTER — LAB (OUTPATIENT)
Dept: LAB | Facility: CLINIC | Age: 33
End: 2018-12-27
Payer: COMMERCIAL

## 2018-12-27 DIAGNOSIS — N92.6 IRREGULAR MENSES: ICD-10-CM

## 2018-12-27 DIAGNOSIS — G95.9 MYELOPATHY (HCC): ICD-10-CM

## 2018-12-27 DIAGNOSIS — M50.20 HERNIATED DISC, CERVICAL: ICD-10-CM

## 2018-12-27 DIAGNOSIS — G95.0 SYRINGOMYELIA (HCC): ICD-10-CM

## 2018-12-27 LAB
ALBUMIN SERPL BCP-MCNC: 4 G/DL (ref 3.5–5)
ALP SERPL-CCNC: 52 U/L (ref 46–116)
ALT SERPL W P-5'-P-CCNC: 19 U/L (ref 12–78)
ANION GAP SERPL CALCULATED.3IONS-SCNC: 7 MMOL/L (ref 4–13)
APTT PPP: 30 SECONDS (ref 26–38)
AST SERPL W P-5'-P-CCNC: 11 U/L (ref 5–45)
B-HCG SERPL-ACNC: <2 MIU/ML
BACTERIA UR QL AUTO: ABNORMAL /HPF
BASOPHILS # BLD AUTO: 0.03 THOUSANDS/ΜL (ref 0–0.1)
BASOPHILS NFR BLD AUTO: 1 % (ref 0–1)
BILIRUB SERPL-MCNC: 0.78 MG/DL (ref 0.2–1)
BILIRUB UR QL STRIP: NEGATIVE
BUN SERPL-MCNC: 15 MG/DL (ref 5–25)
CALCIUM SERPL-MCNC: 8.7 MG/DL (ref 8.3–10.1)
CHLORIDE SERPL-SCNC: 104 MMOL/L (ref 100–108)
CLARITY UR: ABNORMAL
CO2 SERPL-SCNC: 26 MMOL/L (ref 21–32)
COLOR UR: ABNORMAL
CREAT SERPL-MCNC: 0.69 MG/DL (ref 0.6–1.3)
EOSINOPHIL # BLD AUTO: 0.2 THOUSAND/ΜL (ref 0–0.61)
EOSINOPHIL NFR BLD AUTO: 4 % (ref 0–6)
ERYTHROCYTE [DISTWIDTH] IN BLOOD BY AUTOMATED COUNT: 11.9 % (ref 11.6–15.1)
EST. AVERAGE GLUCOSE BLD GHB EST-MCNC: 100 MG/DL
GFR SERPL CREATININE-BSD FRML MDRD: 115 ML/MIN/1.73SQ M
GLUCOSE P FAST SERPL-MCNC: 80 MG/DL (ref 65–99)
GLUCOSE UR STRIP-MCNC: NEGATIVE MG/DL
HBA1C MFR BLD: 5.1 % (ref 4.2–6.3)
HCT VFR BLD AUTO: 40.3 % (ref 34.8–46.1)
HGB BLD-MCNC: 13.7 G/DL (ref 11.5–15.4)
HGB UR QL STRIP.AUTO: ABNORMAL
IMM GRANULOCYTES # BLD AUTO: 0.01 THOUSAND/UL (ref 0–0.2)
IMM GRANULOCYTES NFR BLD AUTO: 0 % (ref 0–2)
INR PPP: 1.1 (ref 0.86–1.17)
KETONES UR STRIP-MCNC: NEGATIVE MG/DL
LEUKOCYTE ESTERASE UR QL STRIP: ABNORMAL
LYMPHOCYTES # BLD AUTO: 1.5 THOUSANDS/ΜL (ref 0.6–4.47)
LYMPHOCYTES NFR BLD AUTO: 32 % (ref 14–44)
MCH RBC QN AUTO: 31.1 PG (ref 26.8–34.3)
MCHC RBC AUTO-ENTMCNC: 34 G/DL (ref 31.4–37.4)
MCV RBC AUTO: 92 FL (ref 82–98)
MONOCYTES # BLD AUTO: 0.22 THOUSAND/ΜL (ref 0.17–1.22)
MONOCYTES NFR BLD AUTO: 5 % (ref 4–12)
NEUTROPHILS # BLD AUTO: 2.68 THOUSANDS/ΜL (ref 1.85–7.62)
NEUTS SEG NFR BLD AUTO: 58 % (ref 43–75)
NITRITE UR QL STRIP: POSITIVE
NON-SQ EPI CELLS URNS QL MICRO: ABNORMAL /HPF
NRBC BLD AUTO-RTO: 0 /100 WBCS
PH UR STRIP.AUTO: 6 [PH] (ref 4.5–8)
PLATELET # BLD AUTO: 205 THOUSANDS/UL (ref 149–390)
PMV BLD AUTO: 10.3 FL (ref 8.9–12.7)
POTASSIUM SERPL-SCNC: 3.5 MMOL/L (ref 3.5–5.3)
PROLACTIN SERPL-MCNC: 19.4 NG/ML
PROT SERPL-MCNC: 7.7 G/DL (ref 6.4–8.2)
PROT UR STRIP-MCNC: NEGATIVE MG/DL
PROTHROMBIN TIME: 14.3 SECONDS (ref 11.8–14.2)
RBC # BLD AUTO: 4.4 MILLION/UL (ref 3.81–5.12)
RBC #/AREA URNS AUTO: ABNORMAL /HPF
SODIUM SERPL-SCNC: 137 MMOL/L (ref 136–145)
SP GR UR STRIP.AUTO: 1.03 (ref 1–1.03)
TSH SERPL DL<=0.05 MIU/L-ACNC: 1.82 UIU/ML (ref 0.36–3.74)
UROBILINOGEN UR QL STRIP.AUTO: 0.2 E.U./DL
WBC # BLD AUTO: 4.64 THOUSAND/UL (ref 4.31–10.16)
WBC #/AREA URNS AUTO: ABNORMAL /HPF

## 2018-12-27 PROCEDURE — 84443 ASSAY THYROID STIM HORMONE: CPT

## 2018-12-27 PROCEDURE — 84702 CHORIONIC GONADOTROPIN TEST: CPT

## 2018-12-27 PROCEDURE — 83036 HEMOGLOBIN GLYCOSYLATED A1C: CPT

## 2018-12-27 PROCEDURE — 85730 THROMBOPLASTIN TIME PARTIAL: CPT

## 2018-12-27 PROCEDURE — 81001 URINALYSIS AUTO W/SCOPE: CPT | Performed by: NEUROLOGICAL SURGERY

## 2018-12-27 PROCEDURE — 87077 CULTURE AEROBIC IDENTIFY: CPT | Performed by: PHYSICIAN ASSISTANT

## 2018-12-27 PROCEDURE — 87186 SC STD MICRODIL/AGAR DIL: CPT | Performed by: PHYSICIAN ASSISTANT

## 2018-12-27 PROCEDURE — 80053 COMPREHEN METABOLIC PANEL: CPT

## 2018-12-27 PROCEDURE — 87086 URINE CULTURE/COLONY COUNT: CPT | Performed by: PHYSICIAN ASSISTANT

## 2018-12-27 PROCEDURE — 87081 CULTURE SCREEN ONLY: CPT

## 2018-12-27 PROCEDURE — 84146 ASSAY OF PROLACTIN: CPT

## 2018-12-27 PROCEDURE — 85610 PROTHROMBIN TIME: CPT

## 2018-12-27 PROCEDURE — 85025 COMPLETE CBC W/AUTO DIFF WBC: CPT

## 2018-12-27 PROCEDURE — 36415 COLL VENOUS BLD VENIPUNCTURE: CPT

## 2018-12-28 DIAGNOSIS — R82.71 BACTERIURIA: Primary | ICD-10-CM

## 2018-12-28 LAB — MRSA NOSE QL CULT: NORMAL

## 2018-12-28 RX ORDER — NITROFURANTOIN 25; 75 MG/1; MG/1
100 CAPSULE ORAL 2 TIMES DAILY
Qty: 6 CAPSULE | Refills: 0 | Status: SHIPPED | OUTPATIENT
Start: 2018-12-28 | End: 2018-12-31 | Stop reason: HOSPADM

## 2018-12-28 NOTE — PROGRESS NOTES
Trying to conceive for 8 mos  Normal prolactin, TSH, metabolic profile, and negative pregnancy test   After trying 4 more months RTO if not pregnant yet  May come sooner if desires

## 2018-12-30 LAB — BACTERIA UR CULT: ABNORMAL

## 2018-12-31 ENCOUNTER — ANESTHESIA (OUTPATIENT)
Dept: PERIOP | Facility: HOSPITAL | Age: 33
End: 2018-12-31
Payer: COMMERCIAL

## 2018-12-31 ENCOUNTER — APPOINTMENT (OUTPATIENT)
Dept: RADIOLOGY | Facility: HOSPITAL | Age: 33
End: 2018-12-31
Payer: COMMERCIAL

## 2018-12-31 ENCOUNTER — HOSPITAL ENCOUNTER (OUTPATIENT)
Facility: HOSPITAL | Age: 33
Setting detail: OUTPATIENT SURGERY
Discharge: HOME | End: 2018-12-31
Attending: NEUROLOGICAL SURGERY | Admitting: NEUROLOGICAL SURGERY
Payer: COMMERCIAL

## 2018-12-31 VITALS
OXYGEN SATURATION: 99 % | DIASTOLIC BLOOD PRESSURE: 55 MMHG | RESPIRATION RATE: 16 BRPM | HEART RATE: 68 BPM | HEIGHT: 63 IN | TEMPERATURE: 97.7 F | SYSTOLIC BLOOD PRESSURE: 96 MMHG | BODY MASS INDEX: 20.55 KG/M2 | WEIGHT: 116 LBS

## 2018-12-31 DIAGNOSIS — N34.2 INFECTIVE URETHRITIS: Primary | ICD-10-CM

## 2018-12-31 LAB
BACTERIA UR QL AUTO: ABNORMAL /HPF
BILIRUB UR QL STRIP: ABNORMAL
CLARITY UR: ABNORMAL
COLOR UR: ABNORMAL
EXT PREGNANCY TEST URINE: NEGATIVE
GLUCOSE UR STRIP-MCNC: NEGATIVE MG/DL
HGB UR QL STRIP.AUTO: ABNORMAL
HYALINE CASTS #/AREA URNS LPF: ABNORMAL /LPF
KETONES UR STRIP-MCNC: ABNORMAL MG/DL
LEUKOCYTE ESTERASE UR QL STRIP: ABNORMAL
NITRITE UR QL STRIP: NEGATIVE
NON-SQ EPI CELLS URNS QL MICRO: ABNORMAL /HPF
PH UR STRIP.AUTO: 5.5 [PH] (ref 4.5–8)
PROT UR STRIP-MCNC: NEGATIVE MG/DL
RBC #/AREA URNS AUTO: ABNORMAL /HPF
SP GR UR STRIP.AUTO: 1.03 (ref 1–1.03)
UROBILINOGEN UR QL STRIP.AUTO: 1 E.U./DL
WBC #/AREA URNS AUTO: ABNORMAL /HPF

## 2018-12-31 PROCEDURE — 81025 URINE PREGNANCY TEST: CPT | Performed by: NEUROLOGICAL SURGERY

## 2018-12-31 PROCEDURE — 81001 URINALYSIS AUTO W/SCOPE: CPT | Performed by: PHYSICIAN ASSISTANT

## 2018-12-31 RX ORDER — SODIUM CHLORIDE, SODIUM LACTATE, POTASSIUM CHLORIDE, CALCIUM CHLORIDE 600; 310; 30; 20 MG/100ML; MG/100ML; MG/100ML; MG/100ML
125 INJECTION, SOLUTION INTRAVENOUS CONTINUOUS
Status: DISCONTINUED | OUTPATIENT
Start: 2018-12-31 | End: 2018-12-31 | Stop reason: HOSPADM

## 2018-12-31 RX ORDER — MIDAZOLAM HYDROCHLORIDE 1 MG/ML
INJECTION INTRAMUSCULAR; INTRAVENOUS AS NEEDED
Status: DISCONTINUED | OUTPATIENT
Start: 2018-12-31 | End: 2018-12-31 | Stop reason: HOSPADM

## 2018-12-31 RX ORDER — VANCOMYCIN HYDROCHLORIDE 1 G/200ML
1000 INJECTION, SOLUTION INTRAVENOUS ONCE
Status: COMPLETED | OUTPATIENT
Start: 2018-12-31 | End: 2018-12-31

## 2018-12-31 RX ORDER — SULFAMETHOXAZOLE AND TRIMETHOPRIM 800; 160 MG/1; MG/1
1 TABLET ORAL EVERY 12 HOURS SCHEDULED
Qty: 14 TABLET | Refills: 0 | Status: SHIPPED | OUTPATIENT
Start: 2018-12-31 | End: 2019-01-07

## 2018-12-31 RX ORDER — ACETAMINOPHEN 325 MG/1
650 TABLET ORAL EVERY 6 HOURS PRN
Status: DISCONTINUED | OUTPATIENT
Start: 2018-12-31 | End: 2018-12-31 | Stop reason: HOSPADM

## 2018-12-31 RX ORDER — ONDANSETRON 2 MG/ML
INJECTION INTRAMUSCULAR; INTRAVENOUS AS NEEDED
Status: DISCONTINUED | OUTPATIENT
Start: 2018-12-31 | End: 2018-12-31 | Stop reason: HOSPADM

## 2018-12-31 RX ORDER — CHLORHEXIDINE GLUCONATE 0.12 MG/ML
15 RINSE ORAL ONCE
Status: COMPLETED | OUTPATIENT
Start: 2018-12-31 | End: 2018-12-31

## 2018-12-31 RX ADMIN — SODIUM CHLORIDE, SODIUM LACTATE, POTASSIUM CHLORIDE, AND CALCIUM CHLORIDE 125 ML/HR: .6; .31; .03; .02 INJECTION, SOLUTION INTRAVENOUS at 07:17

## 2018-12-31 RX ADMIN — ONDANSETRON 4 MG: 2 INJECTION INTRAMUSCULAR; INTRAVENOUS at 07:34

## 2018-12-31 RX ADMIN — CHLORHEXIDINE GLUCONATE 0.12% ORAL RINSE 15 ML: 1.2 LIQUID ORAL at 06:24

## 2018-12-31 RX ADMIN — MIDAZOLAM 2 MG: 1 INJECTION INTRAMUSCULAR; INTRAVENOUS at 07:34

## 2018-12-31 RX ADMIN — SODIUM CHLORIDE, SODIUM LACTATE, POTASSIUM CHLORIDE, AND CALCIUM CHLORIDE: .6; .31; .03; .02 INJECTION, SOLUTION INTRAVENOUS at 07:27

## 2018-12-31 RX ADMIN — VANCOMYCIN HYDROCHLORIDE 1000 MG: 1 INJECTION, SOLUTION INTRAVENOUS at 07:34

## 2018-12-31 NOTE — ANESTHESIA PREPROCEDURE EVALUATION
Review of Systems/Medical History  Patient summary reviewed  Chart reviewed  No history of anesthetic complications     Cardiovascular  Negative cardio ROS Exercise tolerance (METS): good,     Pulmonary  Negative pulmonary ROS        GI/Hepatic  Negative GI/hepatic ROS               Endo/Other  Negative endo/other ROS      GYN       Hematology   Musculoskeletal       Neurology    Headaches,    Psychology           Physical Exam    Airway    Mallampati score: II  TM Distance: >3 FB  Neck ROM: full     Dental       Cardiovascular  Comment: Negative ROS,     Pulmonary      Other Findings        Anesthesia Plan  ASA Score- 2     Anesthesia Type- general with ASA Monitors  Additional Monitors:   Airway Plan: ETT  Comment: Grade I View with Betsy Ng #2 previously        Plan Factors-    Induction- intravenous  Postoperative Plan-     Informed Consent- Anesthetic plan and risks discussed with patient  I personally reviewed this patient with the CRNA  Discussed and agreed on the Anesthesia Plan with the CRNA  Maryellen Rivera

## 2019-01-02 ENCOUNTER — DOCUMENTATION (OUTPATIENT)
Dept: NEUROSURGERY | Facility: CLINIC | Age: 34
End: 2019-01-02

## 2019-01-02 DIAGNOSIS — Z01.818 PREOP TESTING: Primary | ICD-10-CM

## 2019-01-07 ENCOUNTER — APPOINTMENT (OUTPATIENT)
Dept: LAB | Facility: CLINIC | Age: 34
End: 2019-01-07
Payer: COMMERCIAL

## 2019-01-07 LAB
BACTERIA UR QL AUTO: ABNORMAL /HPF
BILIRUB UR QL STRIP: NEGATIVE
CLARITY UR: ABNORMAL
COLOR UR: ABNORMAL
GLUCOSE UR STRIP-MCNC: NEGATIVE MG/DL
HGB UR QL STRIP.AUTO: NEGATIVE
HYALINE CASTS #/AREA URNS LPF: ABNORMAL /LPF
KETONES UR STRIP-MCNC: NEGATIVE MG/DL
LEUKOCYTE ESTERASE UR QL STRIP: ABNORMAL
NITRITE UR QL STRIP: NEGATIVE
NON-SQ EPI CELLS URNS QL MICRO: ABNORMAL /HPF
PH UR STRIP.AUTO: 5.5 [PH] (ref 4.5–8)
PROT UR STRIP-MCNC: ABNORMAL MG/DL
RBC #/AREA URNS AUTO: ABNORMAL /HPF
SP GR UR STRIP.AUTO: 1.03 (ref 1–1.03)
UROBILINOGEN UR QL STRIP.AUTO: 0.2 E.U./DL
WBC #/AREA URNS AUTO: ABNORMAL /HPF

## 2019-01-07 PROCEDURE — 81001 URINALYSIS AUTO W/SCOPE: CPT

## 2019-01-08 ENCOUNTER — TELEPHONE (OUTPATIENT)
Dept: UROLOGY | Facility: AMBULATORY SURGERY CENTER | Age: 34
End: 2019-01-08

## 2019-01-08 DIAGNOSIS — R82.90 ABNORMAL URINALYSIS: Primary | ICD-10-CM

## 2019-01-08 NOTE — TELEPHONE ENCOUNTER
New patient for UTI unresponsive to antibiotics  Reason for appointment/Complaint/Diagnosis : UTI unresponsive to antibiotics    Insurance: [employer]    History of Cancer? no                       If yes, what kind? none    Previous urologist?     no                  Records requested/where? [address]     Outside testing/where? no    Location Preference for office visit?  [place]

## 2019-01-08 NOTE — PROGRESS NOTES
Carmen Walls spoke to patient, she was fine with us referring her to a Urologist  Carmen Walls called and got her in with Dr Charles Waters for tomorrow 1/9/19 @ 10:15am  Gave patient appt/address to office  Asked her to call after appt to let us know the outcome

## 2019-01-08 NOTE — PROGRESS NOTES
Patient has had repeat UAs but they keep getting worse  Dr Sterling Carlisle is concerned that this might be a result from her syrinx  He wants her to consult with Urology for their opinion on the matter

## 2019-01-09 ENCOUNTER — OFFICE VISIT (OUTPATIENT)
Dept: UROLOGY | Facility: AMBULATORY SURGERY CENTER | Age: 34
End: 2019-01-09
Payer: COMMERCIAL

## 2019-01-09 ENCOUNTER — TELEPHONE (OUTPATIENT)
Dept: UROLOGY | Facility: AMBULATORY SURGERY CENTER | Age: 34
End: 2019-01-09

## 2019-01-09 VITALS
WEIGHT: 114.2 LBS | HEART RATE: 66 BPM | BODY MASS INDEX: 20.23 KG/M2 | HEIGHT: 63 IN | DIASTOLIC BLOOD PRESSURE: 64 MMHG | SYSTOLIC BLOOD PRESSURE: 100 MMHG

## 2019-01-09 DIAGNOSIS — R31.29 MICROSCOPIC HEMATURIA: Primary | ICD-10-CM

## 2019-01-09 DIAGNOSIS — R82.90 ABNORMAL URINALYSIS: ICD-10-CM

## 2019-01-09 LAB
SL AMB  POCT GLUCOSE, UA: ABNORMAL
SL AMB LEUKOCYTE ESTERASE,UA: ABNORMAL
SL AMB POCT BILIRUBIN,UA: ABNORMAL
SL AMB POCT BLOOD,UA: ABNORMAL
SL AMB POCT CLARITY,UA: CLEAR
SL AMB POCT COLOR,UA: YELLOW
SL AMB POCT KETONES,UA: ABNORMAL
SL AMB POCT NITRITE,UA: ABNORMAL
SL AMB POCT PH,UA: 6.5
SL AMB POCT SPECIFIC GRAVITY,UA: 1.01
SL AMB POCT URINE PROTEIN: ABNORMAL
SL AMB POCT UROBILINOGEN: ABNORMAL

## 2019-01-09 PROCEDURE — 99244 OFF/OP CNSLTJ NEW/EST MOD 40: CPT | Performed by: UROLOGY

## 2019-01-09 PROCEDURE — 81002 URINALYSIS NONAUTO W/O SCOPE: CPT | Performed by: UROLOGY

## 2019-01-09 NOTE — ASSESSMENT & PLAN NOTE
I will send her urine for culture today to confirm if there is bacteria  Otherwise we will proceed with microscopic hematuria workup including renal ultrasound and cystoscopy  The procedure was discussed in detail with the patient and will schedule this in the near future  Hopefully we will be able to quickly cleared her for surgery

## 2019-01-09 NOTE — PROGRESS NOTES
Assessment/Plan:    Microscopic hematuria  I will send her urine for culture today to confirm if there is bacteria  Otherwise we will proceed with microscopic hematuria workup including renal ultrasound and cystoscopy  The procedure was discussed in detail with the patient and will schedule this in the near future  Hopefully we will be able to quickly cleared her for surgery  Diagnoses and all orders for this visit:    Microscopic hematuria  -     US retroperitoneal complete; Future    Abnormal urinalysis  -     Ambulatory referral to Urology  -     POCT urine dip          Total visit time was 60 minutes of which over 50% was spent on counseling  Subjective:     Patient ID: Golden Reddy is a 35 y o  female    70-year-old female presents for evaluation of bacturia  The patient is supposed to undergo anterior diskectomy and disc arthroplasty but unfortunately her surgery has been canceled as she continues to have bacteria in her urine  The patient has been on a course of nitrofurantoin and Bactrim  She denies any problems with urination  She has no lower urinary tract symptoms  She denies any gross hematuria  She denies any history of smoking  She has no other complaints  The following portions of the patient's history were reviewed and updated as appropriate: allergies, current medications, past family history, past medical history, past social history, past surgical history and problem list     Review of Systems   Constitutional: Negative  HENT: Negative  Eyes: Negative  Respiratory: Negative  Cardiovascular: Negative  Gastrointestinal: Negative  Endocrine: Negative  Genitourinary:        As noted per HPI   Musculoskeletal: Negative  Skin: Negative  Allergic/Immunologic: Negative  Neurological: Negative  Hematological: Negative  Psychiatric/Behavioral: Negative              Urinary Incontinence Screening      Most Recent Value   Urinary Incontinence   Urinary Incontinence? No   Incomplete emptying? No   Urinary frequency? No   Urinary urgency? No   Urinary hesitancy? No   Dysuria (painful difficult urination)? No   Nocturia (waking up to use the bathroom)? No   Straining (having to push to go)? No   Weak stream?  No   Intermittent stream?  No          Objective:    Physical Exam   Constitutional: She is oriented to person, place, and time  She appears well-developed and well-nourished  HENT:   Head: Normocephalic and atraumatic  Neck: Normal range of motion  Neck supple  Cardiovascular: Intact distal pulses  Pulmonary/Chest: Effort normal    Abdominal: Soft  Bowel sounds are normal  She exhibits no distension and no mass  There is no tenderness  There is no rebound and no guarding  Musculoskeletal: Normal range of motion  Neurological: She is alert and oriented to person, place, and time  Skin: Skin is warm and dry  Psychiatric: She has a normal mood and affect  Vitals reviewed          Results  No results found for: PSA  Lab Results   Component Value Date    CALCIUM 8 7 12/27/2018    K 3 5 12/27/2018    CO2 26 12/27/2018     12/27/2018    BUN 15 12/27/2018    CREATININE 0 69 12/27/2018     Lab Results   Component Value Date    WBC 4 64 12/27/2018    HGB 13 7 12/27/2018    HCT 40 3 12/27/2018    MCV 92 12/27/2018     12/27/2018       Recent Results (from the past 1 hour(s))   POCT urine dip    Collection Time: 01/09/19 10:46 AM   Result Value Ref Range    LEUKOCYTE ESTERASE,UA neg     NITRITE,UA neg     SL AMB POCT UROBILINOGEN neg     POCT URINE PROTEIN neg      PH,UA 6 5     BLOOD,UA moderate     SPECIFIC GRAVITY,UA 1 015     KETONES,UA neg     BILIRUBIN,UA neg     GLUCOSE, UA neg      COLOR,UA yellow     CLARITY,UA clear    ]

## 2019-01-09 NOTE — LETTER
January 9, 2019     Boby Kocher, [address]    Patient: Lydia Cristobal   YOB: 1985   Date of Visit: 1/9/2019       Dear Dr Petra Coulter:    Thank you for referring Lydia Cristobal to me for evaluation  Below are my notes for this consultation  If you have questions, please do not hesitate to call me  I look forward to following your patient along with you           Sincerely,        Felipe Villalobos MD        CC: No Recipients

## 2019-01-09 NOTE — TELEPHONE ENCOUNTER
PATIENT NEEDS A CYSTO WITH KK IN [place] IN 2 WEEKS BUT HE SAID ASAP BECAUSE PATIENT NEEDS SURGERY   PLEASE ADVISE WITH AN APPOINTMEN TIME

## 2019-01-10 ENCOUNTER — HOSPITAL ENCOUNTER (OUTPATIENT)
Dept: RADIOLOGY | Facility: HOSPITAL | Age: 34
Discharge: HOME | End: 2019-01-10
Attending: UROLOGY
Payer: COMMERCIAL

## 2019-01-10 DIAGNOSIS — R31.29 MICROSCOPIC HEMATURIA: ICD-10-CM

## 2019-01-10 PROCEDURE — 76770 US EXAM ABDO BACK WALL COMP: CPT

## 2019-01-15 ENCOUNTER — PROCEDURE VISIT (OUTPATIENT)
Dept: UROLOGY | Facility: AMBULATORY SURGERY CENTER | Age: 34
End: 2019-01-15
Payer: COMMERCIAL

## 2019-01-15 VITALS
BODY MASS INDEX: 20.38 KG/M2 | SYSTOLIC BLOOD PRESSURE: 102 MMHG | HEART RATE: 63 BPM | HEIGHT: 63 IN | WEIGHT: 115 LBS | DIASTOLIC BLOOD PRESSURE: 70 MMHG

## 2019-01-15 DIAGNOSIS — R31.29 MICROSCOPIC HEMATURIA: Primary | ICD-10-CM

## 2019-01-15 LAB
SL AMB  POCT GLUCOSE, UA: NORMAL
SL AMB LEUKOCYTE ESTERASE,UA: NORMAL
SL AMB POCT BILIRUBIN,UA: NORMAL
SL AMB POCT BLOOD,UA: NORMAL
SL AMB POCT CLARITY,UA: CLEAR
SL AMB POCT COLOR,UA: YELLOW
SL AMB POCT KETONES,UA: NORMAL
SL AMB POCT NITRITE,UA: NORMAL
SL AMB POCT PH,UA: 6
SL AMB POCT SPECIFIC GRAVITY,UA: 1.02
SL AMB POCT URINE PROTEIN: NORMAL
SL AMB POCT UROBILINOGEN: 0.2

## 2019-01-15 PROCEDURE — 81002 URINALYSIS NONAUTO W/O SCOPE: CPT | Performed by: UROLOGY

## 2019-01-15 PROCEDURE — 52000 CYSTOURETHROSCOPY: CPT | Performed by: UROLOGY

## 2019-01-15 PROCEDURE — 87086 URINE CULTURE/COLONY COUNT: CPT | Performed by: UROLOGY

## 2019-01-15 NOTE — PROGRESS NOTES
Cystoscopy  Date/Time: 1/15/2019 12:04 PM  Performed by: Enedelia Glover  Authorized by: Enedelia Glover     Procedure details: cystoscopy          Written Consent Obtained  Indications for Procedure:  Microscopic hematuria    Physical Exam    Constitutional   General appearance: No acute distress, well appearing and well nourished  Pulmonary   Respiratory effort: No increased work of breathing or signs of respiratory distress  Cardiovascular   Examination of extremities for edema and/or varicosities: Normal     Abdomen   Abdomen: Non-tender, no masses  Liver and spleen: No hepatomegaly or splenomegaly  Genitourinary   External genitalia and vagina: Normal, no lesions appreciated  Urethra: Normal, no discharge  Bladder: Not distended, no tenderness  Musculoskeletal   Gait and station: Normal     Skin   Skin and subcutaneous tissue: Normal without rashes or lesions  Lymphatic   Palpation of lymph nodes in groin: No lymphadenopathy  Additional Exam: Neuro exam nonfocal           The flexible cystoscope was introduced into the urethra and advanced into the bladder  URETHRA:  Normal,  without strictures or lesions  TRIGONE & UOs:   Normal anatomy with efflux of clear urine  No mucosal lesions or subtrigonal masses  BLADDER MUCOSA:  Normal, without neoplasms or other lesions  DETRUSOR: Normal capacity, without flaccidity, without excessive compliance, without trabeculation or diverticula, without uninhibited bladder contractions on fillings  RETROFLEXED SCOPE VIEW: Normal bladder neck    Plan:Imaging and cystoscopy negative  This completes their microscopic hematuria workup  The patient will have a yearly urinalysis with her primary doctor and follow up with us on an as-needed basis  In regards to her positive urine cultures we will send a urine culture today to make sure it is negative  The urinalysis does not look like there is any infection    If the culture is still positive for bacteria the patient should be able to continue on with her cervical spine surgery  We will just place her on perioperative antibiotics to start 3 days before her surgery

## 2019-01-16 LAB — BACTERIA UR CULT: ABNORMAL

## 2019-01-17 NOTE — PRE-PROCEDURE INSTRUCTIONS
No outpatient prescriptions have been marked as taking for the 1/25/19 encounter [facility] Encounter)  Spoke to pt  No reported medications  As of 1 17 19 instructed on tylenol only  Am DOS no meds  Showering instructions given by office  Clarifying with urology re: 3 days antibiotics prior to sx  Will f/u with pt  All instructions verbally understood  Callback number given

## 2019-01-20 ENCOUNTER — TELEPHONE (OUTPATIENT)
Dept: UROLOGY | Facility: AMBULATORY SURGERY CENTER | Age: 34
End: 2019-01-20

## 2019-01-20 NOTE — TELEPHONE ENCOUNTER
Please call patient and let them know the urine culture came back with just some mild contaminants and no signs of recurring infection  She does not need any antibiotics and is cleared for her surgery

## 2019-01-21 NOTE — TELEPHONE ENCOUNTER
Called and spoke with patient, aware of no sign of infection and is cleared for surgery  All questions answered

## 2019-01-24 ENCOUNTER — ANESTHESIA EVENT (OUTPATIENT)
Dept: PERIOP | Facility: HOSPITAL | Age: 34
End: 2019-01-24
Payer: COMMERCIAL

## 2019-01-24 ENCOUNTER — DOCUMENTATION (OUTPATIENT)
Dept: NEUROSURGERY | Facility: CLINIC | Age: 34
End: 2019-01-24

## 2019-01-25 ENCOUNTER — HOSPITAL ENCOUNTER (OUTPATIENT)
Facility: HOSPITAL | Age: 34
Setting detail: OUTPATIENT SURGERY
Discharge: HOME | End: 2019-01-25
Attending: NEUROLOGICAL SURGERY | Admitting: NEUROLOGICAL SURGERY
Payer: COMMERCIAL

## 2019-01-25 ENCOUNTER — APPOINTMENT (OUTPATIENT)
Dept: RADIOLOGY | Facility: HOSPITAL | Age: 34
End: 2019-01-25
Payer: COMMERCIAL

## 2019-01-25 ENCOUNTER — ANESTHESIA (OUTPATIENT)
Dept: PERIOP | Facility: HOSPITAL | Age: 34
End: 2019-01-25
Payer: COMMERCIAL

## 2019-01-25 VITALS
SYSTOLIC BLOOD PRESSURE: 114 MMHG | WEIGHT: 115 LBS | HEIGHT: 63 IN | OXYGEN SATURATION: 100 % | TEMPERATURE: 98 F | RESPIRATION RATE: 16 BRPM | HEART RATE: 74 BPM | BODY MASS INDEX: 20.38 KG/M2 | DIASTOLIC BLOOD PRESSURE: 67 MMHG

## 2019-01-25 DIAGNOSIS — M50.20 HERNIATED DISC, CERVICAL: Primary | ICD-10-CM

## 2019-01-25 LAB — EXT PREGNANCY TEST URINE: NEGATIVE

## 2019-01-25 PROCEDURE — 22856 TOT DISC ARTHRP 1NTRSPC CRV: CPT | Performed by: NEUROLOGICAL SURGERY

## 2019-01-25 PROCEDURE — 81025 URINE PREGNANCY TEST: CPT | Performed by: NEUROLOGICAL SURGERY

## 2019-01-25 PROCEDURE — C1776 JOINT DEVICE (IMPLANTABLE): HCPCS | Performed by: NEUROLOGICAL SURGERY

## 2019-01-25 PROCEDURE — 72040 X-RAY EXAM NECK SPINE 2-3 VW: CPT

## 2019-01-25 DEVICE — PRESTIGE LP DISC 6X14MM
Type: IMPLANTABLE DEVICE | Site: SPINE CERVICAL | Status: FUNCTIONAL
Brand: PRESTIGE® LP CERVICAL DISC SYSTEM

## 2019-01-25 RX ORDER — DOCUSATE SODIUM 100 MG/1
100 CAPSULE, LIQUID FILLED ORAL 2 TIMES DAILY
Status: DISCONTINUED | OUTPATIENT
Start: 2019-01-25 | End: 2019-01-25 | Stop reason: HOSPADM

## 2019-01-25 RX ORDER — ROCURONIUM BROMIDE 10 MG/ML
INJECTION, SOLUTION INTRAVENOUS AS NEEDED
Status: DISCONTINUED | OUTPATIENT
Start: 2019-01-25 | End: 2019-01-25 | Stop reason: SURG

## 2019-01-25 RX ORDER — SODIUM CHLORIDE, SODIUM LACTATE, POTASSIUM CHLORIDE, CALCIUM CHLORIDE 600; 310; 30; 20 MG/100ML; MG/100ML; MG/100ML; MG/100ML
20 INJECTION, SOLUTION INTRAVENOUS CONTINUOUS
Status: DISCONTINUED | OUTPATIENT
Start: 2019-01-25 | End: 2019-01-25

## 2019-01-25 RX ORDER — FENTANYL CITRATE 50 UG/ML
INJECTION, SOLUTION INTRAMUSCULAR; INTRAVENOUS AS NEEDED
Status: DISCONTINUED | OUTPATIENT
Start: 2019-01-25 | End: 2019-01-25 | Stop reason: SURG

## 2019-01-25 RX ORDER — LIDOCAINE HYDROCHLORIDE 10 MG/ML
INJECTION, SOLUTION INFILTRATION; PERINEURAL AS NEEDED
Status: DISCONTINUED | OUTPATIENT
Start: 2019-01-25 | End: 2019-01-25 | Stop reason: SURG

## 2019-01-25 RX ORDER — FENTANYL CITRATE 50 UG/ML
25 INJECTION, SOLUTION INTRAMUSCULAR; INTRAVENOUS
Status: DISCONTINUED | OUTPATIENT
Start: 2019-01-25 | End: 2019-01-25 | Stop reason: HOSPADM

## 2019-01-25 RX ORDER — PROPOFOL 10 MG/ML
INJECTION, EMULSION INTRAVENOUS AS NEEDED
Status: DISCONTINUED | OUTPATIENT
Start: 2019-01-25 | End: 2019-01-25 | Stop reason: SURG

## 2019-01-25 RX ORDER — FENTANYL CITRATE/PF 50 MCG/ML
25 SYRINGE (ML) INJECTION
Status: DISCONTINUED | OUTPATIENT
Start: 2019-01-25 | End: 2019-01-25 | Stop reason: HOSPADM

## 2019-01-25 RX ORDER — SODIUM CHLORIDE 9 MG/ML
100 INJECTION, SOLUTION INTRAVENOUS CONTINUOUS
Status: DISCONTINUED | OUTPATIENT
Start: 2019-01-25 | End: 2019-01-25 | Stop reason: HOSPADM

## 2019-01-25 RX ORDER — HYDROMORPHONE HCL/PF 1 MG/ML
SYRINGE (ML) INJECTION AS NEEDED
Status: DISCONTINUED | OUTPATIENT
Start: 2019-01-25 | End: 2019-01-25 | Stop reason: SURG

## 2019-01-25 RX ORDER — SODIUM CHLORIDE, SODIUM LACTATE, POTASSIUM CHLORIDE, CALCIUM CHLORIDE 600; 310; 30; 20 MG/100ML; MG/100ML; MG/100ML; MG/100ML
100 INJECTION, SOLUTION INTRAVENOUS CONTINUOUS
Status: DISCONTINUED | OUTPATIENT
Start: 2019-01-25 | End: 2019-01-25 | Stop reason: HOSPADM

## 2019-01-25 RX ORDER — ONDANSETRON 2 MG/ML
4 INJECTION INTRAMUSCULAR; INTRAVENOUS ONCE AS NEEDED
Status: DISCONTINUED | OUTPATIENT
Start: 2019-01-25 | End: 2019-01-25 | Stop reason: HOSPADM

## 2019-01-25 RX ORDER — NEOSTIGMINE METHYLSULFATE 1 MG/ML
INJECTION INTRAVENOUS AS NEEDED
Status: DISCONTINUED | OUTPATIENT
Start: 2019-01-25 | End: 2019-01-25 | Stop reason: SURG

## 2019-01-25 RX ORDER — OXYCODONE HYDROCHLORIDE 5 MG/1
10 TABLET ORAL EVERY 4 HOURS PRN
Status: DISCONTINUED | OUTPATIENT
Start: 2019-01-25 | End: 2019-01-25 | Stop reason: HOSPADM

## 2019-01-25 RX ORDER — CHLORHEXIDINE GLUCONATE 0.12 MG/ML
15 RINSE ORAL ONCE
Status: COMPLETED | OUTPATIENT
Start: 2019-01-25 | End: 2019-01-25

## 2019-01-25 RX ORDER — VANCOMYCIN HYDROCHLORIDE 1 G/200ML
1000 INJECTION, SOLUTION INTRAVENOUS ONCE
Status: COMPLETED | OUTPATIENT
Start: 2019-01-25 | End: 2019-01-25

## 2019-01-25 RX ORDER — ONDANSETRON 2 MG/ML
4 INJECTION INTRAMUSCULAR; INTRAVENOUS EVERY 8 HOURS PRN
Status: DISCONTINUED | OUTPATIENT
Start: 2019-01-25 | End: 2019-01-25 | Stop reason: HOSPADM

## 2019-01-25 RX ORDER — OXYCODONE HYDROCHLORIDE 5 MG/1
5 TABLET ORAL EVERY 4 HOURS PRN
Status: DISCONTINUED | OUTPATIENT
Start: 2019-01-25 | End: 2019-01-25 | Stop reason: HOSPADM

## 2019-01-25 RX ORDER — DIPHENHYDRAMINE HYDROCHLORIDE 50 MG/ML
12.5 INJECTION INTRAMUSCULAR; INTRAVENOUS ONCE AS NEEDED
Status: DISCONTINUED | OUTPATIENT
Start: 2019-01-25 | End: 2019-01-25 | Stop reason: HOSPADM

## 2019-01-25 RX ORDER — ACETAMINOPHEN 325 MG/1
650 TABLET ORAL EVERY 4 HOURS PRN
Status: DISCONTINUED | OUTPATIENT
Start: 2019-01-25 | End: 2019-01-25 | Stop reason: HOSPADM

## 2019-01-25 RX ORDER — LIDOCAINE HYDROCHLORIDE AND EPINEPHRINE 10; 10 MG/ML; UG/ML
INJECTION, SOLUTION INFILTRATION; PERINEURAL AS NEEDED
Status: DISCONTINUED | OUTPATIENT
Start: 2019-01-25 | End: 2019-01-25 | Stop reason: HOSPADM

## 2019-01-25 RX ORDER — ONDANSETRON 2 MG/ML
INJECTION INTRAMUSCULAR; INTRAVENOUS AS NEEDED
Status: DISCONTINUED | OUTPATIENT
Start: 2019-01-25 | End: 2019-01-25 | Stop reason: SURG

## 2019-01-25 RX ORDER — GLYCOPYRROLATE 0.2 MG/ML
INJECTION INTRAMUSCULAR; INTRAVENOUS AS NEEDED
Status: DISCONTINUED | OUTPATIENT
Start: 2019-01-25 | End: 2019-01-25 | Stop reason: SURG

## 2019-01-25 RX ORDER — BUPIVACAINE HYDROCHLORIDE AND EPINEPHRINE 5; 5 MG/ML; UG/ML
INJECTION, SOLUTION EPIDURAL; INTRACAUDAL; PERINEURAL AS NEEDED
Status: DISCONTINUED | OUTPATIENT
Start: 2019-01-25 | End: 2019-01-25 | Stop reason: HOSPADM

## 2019-01-25 RX ORDER — HYDROMORPHONE HCL/PF 1 MG/ML
0.4 SYRINGE (ML) INJECTION
Status: DISCONTINUED | OUTPATIENT
Start: 2019-01-25 | End: 2019-01-25 | Stop reason: HOSPADM

## 2019-01-25 RX ORDER — BISACODYL 10 MG
10 SUPPOSITORY, RECTAL RECTAL DAILY PRN
Status: DISCONTINUED | OUTPATIENT
Start: 2019-01-25 | End: 2019-01-25 | Stop reason: HOSPADM

## 2019-01-25 RX ORDER — MIDAZOLAM HYDROCHLORIDE 1 MG/ML
INJECTION INTRAMUSCULAR; INTRAVENOUS AS NEEDED
Status: DISCONTINUED | OUTPATIENT
Start: 2019-01-25 | End: 2019-01-25 | Stop reason: SURG

## 2019-01-25 RX ORDER — ACETAMINOPHEN 325 MG/1
650 TABLET ORAL EVERY 6 HOURS PRN
Status: DISCONTINUED | OUTPATIENT
Start: 2019-01-25 | End: 2019-01-25

## 2019-01-25 RX ORDER — OXYCODONE HYDROCHLORIDE 5 MG/1
10 CAPSULE ORAL EVERY 4 HOURS PRN
Qty: 30 CAPSULE | Refills: 0 | Status: SHIPPED | OUTPATIENT
Start: 2019-01-25 | End: 2019-01-30 | Stop reason: SDUPTHER

## 2019-01-25 RX ADMIN — DEXAMETHASONE SODIUM PHOSPHATE 10 MG: 10 INJECTION INTRAMUSCULAR; INTRAVENOUS at 07:37

## 2019-01-25 RX ADMIN — FENTANYL CITRATE 25 MCG: 50 INJECTION, SOLUTION INTRAMUSCULAR; INTRAVENOUS at 10:02

## 2019-01-25 RX ADMIN — GLYCOPYRROLATE 0.6 MG: 0.2 INJECTION, SOLUTION INTRAMUSCULAR; INTRAVENOUS at 09:25

## 2019-01-25 RX ADMIN — NEOSTIGMINE METHYLSULFATE 2 MG: 1 INJECTION INTRAVENOUS at 09:25

## 2019-01-25 RX ADMIN — OXYCODONE HYDROCHLORIDE 5 MG: 5 TABLET ORAL at 10:58

## 2019-01-25 RX ADMIN — HYDROMORPHONE HYDROCHLORIDE 0.5 MG: 1 INJECTION, SOLUTION INTRAMUSCULAR; INTRAVENOUS; SUBCUTANEOUS at 08:33

## 2019-01-25 RX ADMIN — MIDAZOLAM 2 MG: 1 INJECTION INTRAMUSCULAR; INTRAVENOUS at 07:27

## 2019-01-25 RX ADMIN — CHLORHEXIDINE GLUCONATE 15 ML: 1.2 RINSE ORAL at 06:52

## 2019-01-25 RX ADMIN — FENTANYL CITRATE 50 MCG: 50 INJECTION, SOLUTION INTRAMUSCULAR; INTRAVENOUS at 07:37

## 2019-01-25 RX ADMIN — FENTANYL CITRATE 25 MCG: 50 INJECTION, SOLUTION INTRAMUSCULAR; INTRAVENOUS at 08:33

## 2019-01-25 RX ADMIN — ONDANSETRON 4 MG: 2 INJECTION INTRAMUSCULAR; INTRAVENOUS at 09:22

## 2019-01-25 RX ADMIN — LIDOCAINE HYDROCHLORIDE 100 MG: 10 INJECTION, SOLUTION INFILTRATION; PERINEURAL at 07:37

## 2019-01-25 RX ADMIN — FENTANYL CITRATE 25 MCG: 50 INJECTION, SOLUTION INTRAMUSCULAR; INTRAVENOUS at 08:10

## 2019-01-25 RX ADMIN — PROPOFOL 100 MG: 10 INJECTION, EMULSION INTRAVENOUS at 07:37

## 2019-01-25 RX ADMIN — VANCOMYCIN HYDROCHLORIDE 1000 MG: 1 INJECTION, SOLUTION INTRAVENOUS at 07:20

## 2019-01-25 RX ADMIN — SODIUM CHLORIDE, SODIUM LACTATE, POTASSIUM CHLORIDE, AND CALCIUM CHLORIDE: .6; .31; .03; .02 INJECTION, SOLUTION INTRAVENOUS at 07:20

## 2019-01-25 RX ADMIN — FENTANYL CITRATE 25 MCG: 50 INJECTION, SOLUTION INTRAMUSCULAR; INTRAVENOUS at 10:09

## 2019-01-25 RX ADMIN — ROCURONIUM BROMIDE 35 MG: 10 INJECTION INTRAVENOUS at 07:37

## 2019-01-25 RX ADMIN — OXYCODONE HYDROCHLORIDE 5 MG: 5 TABLET ORAL at 12:44

## 2019-01-25 NOTE — INTERVAL H&P NOTE
H&P reviewed  After examining the patient I find no changes in the patients condition since the H&P had been written      Plan:  Anterior cervical diskectomy and total disc arthroplasty C5/6

## 2019-01-25 NOTE — H&P (VIEW-ONLY)
Cystoscopy  Date/Time: 1/15/2019 12:04 PM  Performed by: Jamey Copeland  Authorized by: Jamey Copeland     Procedure details: cystoscopy          Written Consent Obtained  Indications for Procedure:  Microscopic hematuria    Physical Exam    Constitutional   General appearance: No acute distress, well appearing and well nourished  Pulmonary   Respiratory effort: No increased work of breathing or signs of respiratory distress  Cardiovascular   Examination of extremities for edema and/or varicosities: Normal     Abdomen   Abdomen: Non-tender, no masses  Liver and spleen: No hepatomegaly or splenomegaly  Genitourinary   External genitalia and vagina: Normal, no lesions appreciated  Urethra: Normal, no discharge  Bladder: Not distended, no tenderness  Musculoskeletal   Gait and station: Normal     Skin   Skin and subcutaneous tissue: Normal without rashes or lesions  Lymphatic   Palpation of lymph nodes in groin: No lymphadenopathy  Additional Exam: Neuro exam nonfocal           The flexible cystoscope was introduced into the urethra and advanced into the bladder  URETHRA:  Normal,  without strictures or lesions  TRIGONE & UOs:   Normal anatomy with efflux of clear urine  No mucosal lesions or subtrigonal masses  BLADDER MUCOSA:  Normal, without neoplasms or other lesions  DETRUSOR: Normal capacity, without flaccidity, without excessive compliance, without trabeculation or diverticula, without uninhibited bladder contractions on fillings  RETROFLEXED SCOPE VIEW: Normal bladder neck    Plan:Imaging and cystoscopy negative  This completes their microscopic hematuria workup  The patient will have a yearly urinalysis with her primary doctor and follow up with us on an as-needed basis  In regards to her positive urine cultures we will send a urine culture today to make sure it is negative  The urinalysis does not look like there is any infection    If the culture is still positive for bacteria the patient should be able to continue on with her cervical spine surgery  We will just place her on perioperative antibiotics to start 3 days before her surgery

## 2019-01-25 NOTE — DISCHARGE INSTR - AVS FIRST PAGE
Do not lift, push, or pull greater than 10 lbs      Remove the Band-Aid on Monday    Allow the small paper strips to fall off on their own    He may shower on Monday after removing the Band-Aid    Please make sure that you have a return appointment in 2 weeks time

## 2019-01-25 NOTE — ANESTHESIA PREPROCEDURE EVALUATION
Review of Systems/Medical History  Patient summary reviewed        Cardiovascular   Pulmonary       GI/Hepatic            Endo/Other     GYN       Hematology   Musculoskeletal       Neurology    Headaches,   Comment: History of decompression and shut for Chiari malformation Psychology                Anesthesia Plan  ASA Score- 2     Anesthesia Type- general with ASA Monitors  Additional Monitors:   Airway Plan: ETT  Plan Factors-    Induction- intravenous  Postoperative Plan-     Informed Consent- Anesthetic plan and risks discussed with patient  I personally reviewed this patient with the CRNA  Discussed and agreed on the Anesthesia Plan with the CRNA  Carlita Mancera

## 2019-01-25 NOTE — OP NOTE
OPERATIVE REPORT  PATIENT NAME: Britni Casarez    :  1985  MRN: [id number]  Pt Location: [facility] OR ROOM 17    SURGERY DATE: 2019    Surgeon(s) and Role:     * Pedro Ross MD - Primary    Preop Diagnosis:  Syringomyelia ([address] ) [G95 0]  Herniated disc, cervical [M50 20]  Myelopathy ([address] ) [G95 9]    Post-Op Diagnosis Codes: * Syringomyelia ([address] ) [G95 0]     * Herniated disc, cervical [M50 20]     * Myelopathy ([address] ) [G95 9]    Procedure(s) (LRB): Anterior cervical discectomy and total disc arthroplasty C5-6 (N/A)    Specimen(s):  * No specimens in log *    Estimated Blood Loss:   Minimal    Drains:   None    Anesthesia Type:   General    Operative Indications:  Syringomyelia ([address] ) [G95 0]  Herniated disc, cervical [M50 20]  Myelopathy ([address] ) [G95 9]      Operative Findings:  Herniated disc    Complications:   None    Procedure and Technique:  After adequate general endotracheal anesthesia the patient was placed supine on the operating table  The anterior neck was prepped with Betadine soap then DuraPrep  Double layer drapes were placed in normal fashion and a 3M Betadine impregnated sticky drape was placed over these  Time-out was called and all parameters a time-out were followed  The skin of the anterior neck was injected with 1% lidocaine with 1 100,000 epinephrine  A number 15 blade was used to incise the skin  Bovie and bipolar were used throughout the procedure to maintain hemostasis  The Bovie on the cutting setting was used to divide the tissues to the platysma  The platysma was identified, coagulated, and divided  A combination of blunt and sharp dissection was carried out to expose the structures of the anterior spinal space  The longus coli muscles were elevated from the underlying bone with the use of the Bovie  The [place] retractor system was used to maintain operative exposure    Intraoperative fluoroscopy was used throughout the case to aid in the identification of level as well as positioning of construct  There was radiology over-read  The annulus of the C 5/6 level was thus identified  Combination of pituitary rongeurs, Kerrison rongeurs, and curettes as well as the Midas-Juan Antonio drill with a series of cornerstone barrel bits were utilized to perform a complete diskectomy  Posterior projecting osteophytes were removed with the use of a 2 5 mm match stick on the Midas-Juan Antonio drill and curved curettes and Kerrison rongeurs  Bilateral decompressive foraminotomies were performed with the use of curved curettes and Kerrison rongeurs as well as the Midas juan antonio drill  Next a 6 x 14 mm Prestige LP disc arthroplasty trial was selected  The appropriate Kaylan Muff was utilized and troughs were drilled  The system was impacted into position without difficulty  Final AP and lateral images were obtained fluoroscopically  Valsalva x2 produced no bleeding  Accordingly the platysma was approximated with interrupted inverted 3 0 Vicryl suture  The skin was closed with interrupted inverted 3 0 Vicryl suture  Benzoin and Steri-Strips were placed and a sterile dressing was placed     I was present for the entire procedure    Patient Disposition:  PACU     SIGNATURE: Cinthya Salgado MD  DATE: January 25, 2019  TIME: 9:51 AM

## 2019-01-25 NOTE — PROGRESS NOTES
Assumed care of patient at 1220 hours  Report received from Taj Lo in [address]  Patient in supine position with HOB elevated to 60 degrees  In NAD  Noted tolerated light snack PO  Pt's boyfriend Karen Sever and pt's mother Lucas Gonsales at bedside

## 2019-01-25 NOTE — DISCHARGE INSTRUCTIONS
Anterior Cervical Discectomy   WHAT YOU NEED TO KNOW:   Anterior cervical discectomy is surgery to remove one or more cervical discs from your neck  A cervical disc is material that cushions and separates the vertebrae of your neck  The discs help your spine support your head and protect your spine from being damaged when you move  DISCHARGE INSTRUCTIONS:   Medicines:   · Pain medicine: You may need medicine to take away or decrease pain  ¨ Learn how to take your medicine  Ask what medicine and how much you should take  Be sure you know how, when, and how often to take it  ¨ Do not wait until the pain is severe before you take your medicine  Tell caregivers if your pain does not decrease  ¨ Pain medicine can make you dizzy or sleepy  Prevent falls by calling someone when you get out of bed or if you need help  · Take your medicine as directed  Contact your healthcare provider if you think your medicine is not helping or if you have side effects  Tell him or her if you are allergic to any medicine  Keep a list of the medicines, vitamins, and herbs you take  Include the amounts, and when and why you take them  Bring the list or the pill bottles to follow-up visits  Carry your medicine list with you in case of an emergency  Follow up with your healthcare provider or orthopedic surgeon as directed:  Tell your healthcare provider or orthopedic surgeon if you are having any pain or other symptoms  He may do a physical exam and check your muscle strength and reflexes  You may need tests such as a cervical spine x-ray, CT scan, or MRI to help healthcare providers check the position of each vertebra  The tests will also show if your graft, plates, or screws have moved out of place  Ask how often you should clean your surgical wound and change your bandage  Write down your questions so you remember to ask them during your visits    Activity:  Your healthcare provider or orthopedic surgeon may tell you to take many short walks after your surgery  Walking helps blood move through your body and may help prevent blood clots from forming  If you feel weak or dizzy, sit or lie down right away  Neck brace: You may need to wear a neck brace for a few weeks after your surgery  The brace will support your neck and hold it in the right position while you are healing  Do not stop wearing your neck brace until your healthcare provider says it is okay  Physical therapy:  You may need physical therapy after your surgery  A physical therapist will help you with exercises to decrease pain and improve movement  Physical therapy can also help improve strength in the muscles that support your neck and decrease your risk for loss of function  Contact your healthcare provider or orthopedic surgeon if:   · You have a fever  · You have a cough that does not go away  · The skin around your surgical site is red, warm, or swollen  · You have yellow or bad-smelling fluid coming from your wound  · You have new or worsening trouble when you swallow  · You have new or worsening pain in your neck or arm  · You have worsening hoarseness, or you have trouble speaking  · You have questions or concerns about your condition or care  Seek care immediately or call 911 if:   · Your bandage begins to soak with blood  · Your surgical wound breaks open  · You have painful swelling in your neck and trouble swallowing  · You have new or worsening trouble moving your neck, arms, or legs  · You start leaking urine or bowel movement  · You suddenly feel lightheaded and have trouble breathing  · You have chest pain  You may have more pain when you take a deep breath or cough  You may cough up blood  · Your arm or leg feels warm, tender, and painful  It may look swollen and red    © 2017 [employer] Information is for End User's use only and may not be sold, redistributed or otherwise used for commercial purposes  All illustrations and images included in CareNotes® are the copyrighted property of A D A M , Inc  or Patrick Sigala  The above information is an  only  It is not intended as medical advice for individual conditions or treatments  Talk to your doctor, nurse or pharmacist before following any medical regimen to see if it is safe and effective for you

## 2019-01-26 ENCOUNTER — TELEPHONE (OUTPATIENT)
Dept: NEUROSURGERY | Facility: CLINIC | Age: 34
End: 2019-01-26

## 2019-01-26 ENCOUNTER — APPOINTMENT (EMERGENCY)
Dept: RADIOLOGY | Facility: HOSPITAL | Age: 34
End: 2019-01-26
Payer: COMMERCIAL

## 2019-01-26 ENCOUNTER — HOSPITAL ENCOUNTER (EMERGENCY)
Facility: HOSPITAL | Age: 34
Discharge: HOME | End: 2019-01-26
Attending: EMERGENCY MEDICINE | Admitting: EMERGENCY MEDICINE
Payer: COMMERCIAL

## 2019-01-26 VITALS
TEMPERATURE: 97.4 F | HEIGHT: 63 IN | DIASTOLIC BLOOD PRESSURE: 59 MMHG | BODY MASS INDEX: 20.39 KG/M2 | WEIGHT: 115.08 LBS | OXYGEN SATURATION: 97 % | RESPIRATION RATE: 18 BRPM | HEART RATE: 84 BPM | SYSTOLIC BLOOD PRESSURE: 106 MMHG

## 2019-01-26 DIAGNOSIS — R20.2 PARESTHESIA OF LEFT UPPER EXTREMITY: ICD-10-CM

## 2019-01-26 DIAGNOSIS — R13.10 DYSPHAGIA, UNSPECIFIED TYPE: ICD-10-CM

## 2019-01-26 DIAGNOSIS — G89.18 POST-OPERATIVE PAIN: Primary | ICD-10-CM

## 2019-01-26 LAB
ANION GAP SERPL CALCULATED.3IONS-SCNC: 4 MMOL/L (ref 4–13)
BUN SERPL-MCNC: 11 MG/DL (ref 5–25)
CALCIUM SERPL-MCNC: 8.5 MG/DL (ref 8.3–10.1)
CHLORIDE SERPL-SCNC: 103 MMOL/L (ref 100–108)
CO2 SERPL-SCNC: 28 MMOL/L (ref 21–32)
CREAT SERPL-MCNC: 0.58 MG/DL (ref 0.6–1.3)
EXT PREG TEST URINE: NEGATIVE
GFR SERPL CREATININE-BSD FRML MDRD: 121 ML/MIN/1.73SQ M
GLUCOSE SERPL-MCNC: 89 MG/DL (ref 65–140)
POTASSIUM SERPL-SCNC: 3.4 MMOL/L (ref 3.5–5.3)
SODIUM SERPL-SCNC: 135 MMOL/L (ref 136–145)

## 2019-01-26 PROCEDURE — 96375 TX/PRO/DX INJ NEW DRUG ADDON: CPT

## 2019-01-26 PROCEDURE — 99284 EMERGENCY DEPT VISIT MOD MDM: CPT

## 2019-01-26 PROCEDURE — 96374 THER/PROPH/DIAG INJ IV PUSH: CPT

## 2019-01-26 PROCEDURE — 36415 COLL VENOUS BLD VENIPUNCTURE: CPT | Performed by: EMERGENCY MEDICINE

## 2019-01-26 PROCEDURE — 71046 X-RAY EXAM CHEST 2 VIEWS: CPT

## 2019-01-26 PROCEDURE — 72126 CT NECK SPINE W/DYE: CPT

## 2019-01-26 PROCEDURE — 81025 URINE PREGNANCY TEST: CPT | Performed by: EMERGENCY MEDICINE

## 2019-01-26 PROCEDURE — 80048 BASIC METABOLIC PNL TOTAL CA: CPT | Performed by: EMERGENCY MEDICINE

## 2019-01-26 RX ORDER — HYDROMORPHONE HCL/PF 1 MG/ML
1 SYRINGE (ML) INJECTION ONCE
Status: COMPLETED | OUTPATIENT
Start: 2019-01-26 | End: 2019-01-26

## 2019-01-26 RX ORDER — ONDANSETRON 2 MG/ML
4 INJECTION INTRAMUSCULAR; INTRAVENOUS ONCE
Status: COMPLETED | OUTPATIENT
Start: 2019-01-26 | End: 2019-01-26

## 2019-01-26 RX ADMIN — DEXAMETHASONE SODIUM PHOSPHATE 10 MG: 10 INJECTION, SOLUTION INTRAMUSCULAR; INTRAVENOUS at 21:40

## 2019-01-26 RX ADMIN — HYDROMORPHONE HYDROCHLORIDE 1 MG: 1 INJECTION, SOLUTION INTRAMUSCULAR; INTRAVENOUS; SUBCUTANEOUS at 20:00

## 2019-01-26 RX ADMIN — ONDANSETRON 4 MG: 2 INJECTION INTRAMUSCULAR; INTRAVENOUS at 20:00

## 2019-01-26 RX ADMIN — IOHEXOL 85 ML: 350 INJECTION, SOLUTION INTRAVENOUS at 20:15

## 2019-01-26 NOTE — ED PROVIDER NOTES
History  Chief Complaint   Patient presents with    Post-op Problem     pt had disc replacement surgery, trouble swallowing, L arm "hot to touch and swollen "     36 yo F presents to ED for neck pain, pain with swallowing, and L upper arm warmth and L hand tingling  Pt had anterior cervical discectomy and disc total arthroplasty of C5-C6 yesterday by Dr Juan Garcia and discharged to home afterwards  Pt says today she has noticed worsening pain with swallowing and says she is unable to swallow solids due to the pain  No voice change  She says the pain radiates to her upper chest and she has pain with inspiration  Otherwise denies shortness of breath or chest pain  No nausea/vomiting  No fever/chills  Today around 1 pm, she says she noticed that her L upper arm felt warmer than the R  She started rubbing it, and said it "felt inflamed " She then also noticed that her L hand was tingling- entire hand/fingers  No weakness  Prior to Admission Medications   Prescriptions Last Dose Informant Patient Reported?  Taking?   oxyCODONE (OXY-IR) 5 MG capsule   No No   Sig: Take 2 capsules (10 mg total) by mouth every 4 (four) hours as needed for moderate pain for up to 10 days Max Daily Amount: 60 mg      Facility-Administered Medications: None       Past Medical History:   Diagnosis Date    Chiari I malformation [address] [address] )     UTI (urinary tract infection)     treated with antibiotics recently will have ua repeated        Past Surgical History:   Procedure Laterality Date    BACK SURGERY      BRAIN SURGERY      NM SUBOCCIPT DECOMP MEDULLA/SP CRD N/A 12/14/2016    Procedure: REPEAT SUBOCCIPITAL CRANIECTOMY; C1 LAMINECTOMY AND DURAPLASTY; PLACEMENT OF 4TH VENTRICULAR-SUBARACHNOID SHUNT; IMPULSE MONITORING;  Surgeon: Henrique Bajwa MD;  Location: [facility];  Service: Neurosurgery       Family History   Problem Relation Age of Onset    No Known Problems Mother     Hypertension Father     Heart failure Sister    [facility] Asthma Son     Heart disease Maternal Grandmother     Diabetes Paternal Grandmother      I have reviewed and agree with the history as documented  Social History   Substance Use Topics    Smoking status: Never Smoker    Smokeless tobacco: Never Used    Alcohol use Yes      Comment: rare / special occasions         Review of Systems   Constitutional: Negative for activity change, chills and fever  HENT: Positive for sore throat  Negative for congestion, rhinorrhea and voice change  Eyes: Negative for pain, discharge and visual disturbance  Respiratory: Negative for cough, chest tightness and shortness of breath  Cardiovascular: Negative for chest pain, palpitations and leg swelling  Gastrointestinal: Negative for abdominal pain, nausea and vomiting  Genitourinary: Negative for dysuria, frequency and urgency  Musculoskeletal: Positive for neck pain  Negative for gait problem and myalgias  Skin: Negative for color change, pallor and rash  Neurological: Negative for dizziness, syncope, light-headedness and headaches  Physical Exam  ED Triage Vitals [01/26/19 1533]   Temperature Pulse Respirations Blood Pressure SpO2   (!) 97 4 °F (36 3 °C) 98 18 126/58 98 %      Temp Source Heart Rate Source Patient Position - Orthostatic VS BP Location FiO2 (%)   Tympanic Monitor Sitting Left arm --      Pain Score       4           Orthostatic Vital Signs  Vitals:    01/26/19 1825 01/26/19 1930 01/26/19 2011 01/26/19 2100   BP: 100/54 101/58 108/58 106/59   Pulse: 103 78 70 84   Patient Position - Orthostatic VS:   Lying        Physical Exam   Constitutional: She is oriented to person, place, and time  She appears well-developed and well-nourished  No distress  HENT:   Head: Normocephalic and atraumatic  Mouth/Throat: Oropharynx is clear and moist    Eyes: Pupils are equal, round, and reactive to light  Conjunctivae and EOM are normal  Right eye exhibits no discharge   Left eye exhibits no discharge  Neck: Neck supple  Decreased ROM due to pain  Anterior incision healing appropriately  Appropriate tenderness  No crepitance  No obvious edema  Cardiovascular: Normal rate, regular rhythm and intact distal pulses  Pulmonary/Chest: Effort normal and breath sounds normal  No stridor  No respiratory distress  She exhibits no tenderness  Abdominal: Soft  There is no tenderness  There is no rebound and no guarding  Musculoskeletal: Normal range of motion  She exhibits no edema or tenderness  Neurological: She is alert and oriented to person, place, and time  A sensory deficit (subjective decreased sensation to light touch entire L hand, only the hand) is present  She exhibits normal muscle tone  Strength 5/5 bilateral upper extremities   Skin: Skin is warm and dry  Capillary refill takes less than 2 seconds  Nursing note and vitals reviewed        ED Medications  Medications   HYDROmorphone (DILAUDID) injection 1 mg (1 mg Intravenous Given 1/26/19 2000)   ondansetron (ZOFRAN) injection 4 mg (4 mg Intravenous Given 1/26/19 2000)   iohexol (OMNIPAQUE) 350 MG/ML injection (MULTI-DOSE) 85 mL (85 mL Intravenous Given 1/26/19 2015)   dexamethasone 10 mg/mL oral liquid 10 mg 1 mL (10 mg Oral Given 1/26/19 2140)        Diagnostic Studies  Results Reviewed     Procedure Component Value Units Date/Time    POCT pregnancy, urine [[id number]]  (Normal) Resulted:  01/26/19 1955    Lab Status:  Final result Updated:  01/26/19 1955     EXT PREG TEST UR (Ref: Negative) negative    Basic metabolic panel [[id number]]  (Abnormal) Collected:  01/26/19 1818    Lab Status:  Final result Specimen:  Blood from Arm, Left Updated:  01/26/19 1928     Sodium 135 (L) mmol/L      Potassium 3 4 (L) mmol/L      Chloride 103 mmol/L      CO2 28 mmol/L      ANION GAP 4 mmol/L      BUN 11 mg/dL      Creatinine 0 58 (L) mg/dL      Glucose 89 mg/dL      Calcium 8 5 mg/dL      eGFR 121 ml/min/1 73sq m     Narrative:         [facility] recommendations are as follows:  GFR calculation is accurate only with a steady state creatinine  Chronic Kidney disease less than 60 ml/min/1 73 sq  meters  Kidney failure less than 15 ml/min/1 73 sq  meters  XR chest 2 views   Final Result by Emilie Polanco MD (01/27 1552)      No acute cardiopulmonary disease  Workstation performed: [id number]         CT spine cervical w contrast   Final Result by Debbi Canada MD (01/26 2059)      Predominately there are expected postoperative changes including small amounts of gas in the soft tissues in the anterior aspect of the neck and minimal fluid, but no loculated collection or abscess  There is a 2 6 mm thickness high density area behind the C6 vertebral body in the central canal the spine  This could be minimal postoperative enhancement or potentially a very small hematoma  Consider surveillance imaging to ensure stability or    resolution  The C5-C6 disc prosthesis is slightly to the right of midline  Whether this is clinically significant is uncertain  There is a CSF drain extending from the 4th ventricle to the subarachnoid space in the cervical spine  Workstation performed: [id number]               Procedures  Procedures      Phone Consults  ED Phone Contact    ED Course                               MDM  Number of Diagnoses or Management Options  Dysphagia, unspecified type:   Paresthesia of left upper extremity:   Post-operative pain:   Diagnosis management comments: Spoke to Dr Karthik Staples with neurosurgery, recommended CT C spine with contrast   Will treat pt symptomatically for pain  Pt feeling better after dilaudid  CT without significant acute findings  Results discussed with Dr Karthik Staples who says pt can be discharged home  Expected post-op findings  Will give decadron as well for pain with swallowing  Follow up with neurosurgery   Return precautions discussed  CritCare Time    Disposition  Final diagnoses:   Post-operative pain   Dysphagia, unspecified type   Paresthesia of left upper extremity     Time reflects when diagnosis was documented in both MDM as applicable and the Disposition within this note     Time User Action Codes Description Comment    1/26/2019  9:14 PM [place] KELLY Add [G89 18] Post-operative pain     1/26/2019  9:14 PM Pawan Gayle Add [R13 10] Dysphagia, unspecified type     1/26/2019  9:14 PM [place] KELLY Add [R20 2] Paresthesia of left upper extremity       ED Disposition     ED Disposition Condition Comment    Discharge  Ary Lemus discharge to home/self care  Condition at discharge: Good        Follow-up Information     Follow up With Specialties Details Why Contact Info    Chava Dumont MD Neurosurgery Call in 2 days  [place] [id number] [phone]            Discharge Medication List as of 1/26/2019  9:15 PM      CONTINUE these medications which have NOT CHANGED    Details   oxyCODONE (OXY-IR) 5 MG capsule Take 2 capsules (10 mg total) by mouth every 4 (four) hours as needed for moderate pain for up to 10 days Max Daily Amount: 60 mg, Starting Fri 1/25/2019, Until Mon 2/4/2019, Print           No discharge procedures on file  ED Provider  Attending physically available and evaluated Irasema Fan I managed the patient along with the ED Attending      Electronically Signed by         Mukesh Burgess MD  01/27/19 5080

## 2019-01-26 NOTE — TELEPHONE ENCOUNTER
Patient had cervical disc surgery yesterday with Dr Tahir Martini  She called today complaining of swollen red warm left arm as well as chest pain and shortness of breath  She is advised to come to the emergency room for evaluation

## 2019-01-27 NOTE — ED ATTENDING ATTESTATION
Zeyad Cardoso MD, saw and evaluated the patient  I have discussed the patient with the resident/non-physician practitioner and agree with the resident's/non-physician practitioner's findings, Plan of Care, and MDM as documented in the resident's/non-physician practitioner's note, except where noted  All available labs and Radiology studies were reviewed  At this point I agree with the current assessment done in the Emergency Department  I have conducted an independent evaluation of this patient a history and physical is as follows:    75-year-old woman presents after discharge yesterday from cervical disc replacement surgery  Complains of dysphagia, bilateral neck pain, left arm pain and some tingling in the left fingers  On exam patient overall well-appearing, in mild pain distress  Neck is supple with no swelling, masses, ecchymosis, erythema  Normal range of motion  Patient tolerating secretions and in no respiratory distress  Heart regular rate and rhythm, no murmurs rubs or gallops  Lungs clear to auscultation bilaterally  There is some mild decreased sensation to left finger tips, otherwise nonfocal neuro  CT does not show any significant acute abnormality  This scan was reviewed by Dr Karthik Staples of Neurosurgery as well  Patient feeling better after treatment in the emergency department  Will discharge with instructions to follow up with Neurosurgery, return precautions given        Critical Care Time  CritCare Time    Procedures

## 2019-01-27 NOTE — DISCHARGE INSTRUCTIONS
Paresthesia   WHAT YOU NEED TO KNOW:   Paresthesia is numbness and tingling  It can happen in any part of your body, but usually occurs in your legs, feet, arms, or hands  There are a large number of conditions that can cause paresthesia  It affects the nerves that provide sensation and happens when there are changes in nerves or nerve pathways  These changes can be temporary, such as if you take certain medicines or you are not getting enough vitamin B  Paresthesia can become permanent when there is nerve damage  Conditions that may cause nerve damage include diabetes, carpel tunnel syndrome, stroke, and multiple sclerosis  The exact cause of your paresthesia may be unknown  DISCHARGE INSTRUCTIONS:   Medicines:  Ask for more information about medicines you may need to treat the condition causing your paresthesias  · NSAIDs  help decrease swelling and pain or fever  This medicine is available with or without a doctor's order  NSAIDs can cause stomach bleeding or kidney problems in certain people  If you take blood thinner medicine, always ask your healthcare provider if NSAIDs are safe for you  Always read the medicine label and follow directions  · Take your medicine as directed  Contact your healthcare provider if you think your medicine is not helping or if you have side effects  Tell him or her if you are allergic to any medicine  Keep a list of the medicines, vitamins, and herbs you take  Include the amounts, and when and why you take them  Bring the list or the pill bottles to follow-up visits  Carry your medicine list with you in case of an emergency  Follow up with your healthcare provider or neurologist as directed:  Write down your questions so you remember to ask them during your visits  Contact your healthcare provider or neurologist if:   · Your symptoms do not improve  · You have symptoms in more than one part of your body  · You have questions about your condition or care    Return to the emergency department if:   · You have any of the following signs of a stroke:      ¨ Numbness or drooping on one side of your face     ¨ Weakness in an arm or leg    ¨ Confusion or difficulty speaking    ¨ Dizziness, a severe headache, or vision loss    · You are not able to control your urine or bowel movements  · You have severe pain along with numbness and tingling  · Your legs suddenly become cold  You have trouble moving your legs, and they ache  · You have increased weakness in a part of your body  · You have uncontrolled movements, or you have a seizure  © 2017 [address] [employer] Information is for End User's use only and may not be sold, redistributed or otherwise used for commercial purposes  All illustrations and images included in CareNotes® are the copyrighted property of A D A [employer]  or Patrick Sigala  The above information is an  only  It is not intended as medical advice for individual conditions or treatments  Talk to your doctor, nurse or pharmacist before following any medical regimen to see if it is safe and effective for you  Dysphagia   WHAT YOU NEED TO KNOW:   Dysphagia is trouble swallowing  It occurs when you have trouble moving food or liquid from your mouth to your esophagus or down to your stomach  It may occur when you eat, drink, or any time you try to swallow  DISCHARGE INSTRUCTIONS:   Return to the emergency department if:   · You choke on your own saliva  · You have chest pain  · You have shortness of breath  · You cannot eat or drink liquids at all  Contact your healthcare provider if:   · You lose weight without trying  · Your signs and symptoms get worse, or you have new signs or symptoms  · You have signs or symptoms of dehydration, such as increased thirst, dark yellow urine, or little or no urine  · You get colds often  · You have questions or concerns about your condition or care    Nutrition:  You may need to change the texture of the foods you eat to help reduce choking problems  Your healthcare provider may show you how to thicken liquids or soften foods to make them easier to swallow  Follow up with your healthcare provider as directed:  Write down your questions so you remember to ask them during your visits  © 2017 [address] [employer] [employer] Information is for End User's use only and may not be sold, redistributed or otherwise used for commercial purposes  All illustrations and images included in CareNotes® are the copyrighted property of A D A [employer]  or Patrick Sigala  The above information is an  only  It is not intended as medical advice for individual conditions or treatments  Talk to your doctor, nurse or pharmacist before following any medical regimen to see if it is safe and effective for you

## 2019-01-28 ENCOUNTER — TELEPHONE (OUTPATIENT)
Dept: NEUROSURGERY | Facility: CLINIC | Age: 34
End: 2019-01-28

## 2019-01-28 NOTE — TELEPHONE ENCOUNTER
Called patient to see how she is doing after surgery on 1/25/19  Patient went to the ER yesterday due to difficulties swallowing and pain when taking a deep breath  Patient reports that she is doing much better today  She is still experiencing some pain but states that the pain medication is helping to control that  She is able to swallow both liquids and solids  Advised her to continue with a softer diet for now and to monitor the incision area  She is to call the office immediately with any s/s infection or fevers  She is to call the office or report to the ED with any difficulties swallowing  Patient was appreciative for the call

## 2019-01-28 NOTE — TELEPHONE ENCOUNTER
----- Message from Sheila Manuel MD sent at 1/26/2019  9:14 PM EST -----  Patient in ER tonight with complaints of difficulty swallowing, and left arm feeling 'different,' hot and swollen  No objective weakness  CT neck done, no pre-vertebral hematoma  Read suggests 2 mm area behind C6 - don't expect it significant  Left arm symptoms I expect are post decompression parathesias  I suggested some decadron for the swallowing, and she is set to be discharged  Suggest follow-up call Monday

## 2019-01-30 DIAGNOSIS — M50.20 HERNIATED DISC, CERVICAL: ICD-10-CM

## 2019-01-30 RX ORDER — OXYCODONE HYDROCHLORIDE 5 MG/1
CAPSULE ORAL
Qty: 28 CAPSULE | Refills: 0 | Status: SHIPPED | OUTPATIENT
Start: 2019-01-30 | End: 2019-03-12

## 2019-01-30 NOTE — TELEPHONE ENCOUNTER
Pt left a message reporting continued pain with swallowing and she has completed her pain medication  Requesting refill  Per Ed Minger refill with schedule change will be transmitted  Pt notified

## 2019-02-08 ENCOUNTER — CLINICAL SUPPORT (OUTPATIENT)
Dept: NEUROSURGERY | Facility: CLINIC | Age: 34
End: 2019-02-08

## 2019-02-08 VITALS — SYSTOLIC BLOOD PRESSURE: 90 MMHG | TEMPERATURE: 98.8 F | DIASTOLIC BLOOD PRESSURE: 62 MMHG

## 2019-02-08 DIAGNOSIS — Z98.890 POST-OPERATIVE STATE: ICD-10-CM

## 2019-02-08 DIAGNOSIS — Z98.890 POST-OPERATIVE STATE: Primary | ICD-10-CM

## 2019-02-08 DIAGNOSIS — M62.838 MUSCLE SPASM: Primary | ICD-10-CM

## 2019-02-08 PROCEDURE — 99024 POSTOP FOLLOW-UP VISIT: CPT

## 2019-02-08 RX ORDER — METHOCARBAMOL 750 MG/1
750 TABLET, FILM COATED ORAL EVERY 6 HOURS PRN
Qty: 60 TABLET | Refills: 0 | Status: SHIPPED | OUTPATIENT
Start: 2019-02-08 | End: 2019-03-12

## 2019-02-08 NOTE — PROGRESS NOTES
Post-Op Visit-Neurosurgery    Ary Villanueva 35 y o  female MRN: [id number]    Chief Complaint  Patient presents post: Anterior cervical discectomy and total disc arthroplasty C5-6 (N/A Spine Cervical)    History of Present Illness  Patient presents for 2 week POV for incision check  Arrived accompanied by her significant other and ambulated well without assistive device  Reports pain is a 6/10 in her neck, primarily on the back and R side  She does not feel this is muscular in nature  Has been taking oxycodone occasionally however said it does not seem to relief her pain but rather make her drowsy and sleep  Has tried to increase her activity level but her discomfort has prevented her from doing much  BP noted to be on the lower side, and reports not eating this morning  Has been experiencing nauseous with extended standing and fatigue when walking short distances  Assessment  Wound Exam: Incision is clean, dry, and in tact, well approximated, without heat, redness, swelling, or drainage  See image below:        Procedure  Staple/suture removal    location: Anterior cervical spine  Procedure Note: dissolvable sutures not visible  Patient Status:  the patient tolerated the procedure well  Complications: None  Incision DEEDEE  Discussion/Summary  Discussed patients symptoms with JUJU ARCINIEGA who does not believe they are related to recent surgery, and unlikely related to Chiari malformation  Directed patient to PCP for workup  Of note 1/26 labs Sodium 135, Potassium 3 4, denies anemia, pregnancy at this time  Reviewed incision care with patient including daily observation for s/s infection including: increased erythema, edema, drainage, dehiscence of incision or fever >101  Should these be observed, she understands that she is to call and/or return immediately for reassessment    Advised patient to continue cleansing area with mild soap and water and pat dry  Not to apply any lotions, creams, or ointments, & not to submerge in any water for two more weeks  She is to maintain activity restrictions until cleared by the surgeon  Activity levels were also reviewed with the patient in detail, she is to lift no greater than 10 pounds, advised to limit bend/twisting and ambulation is encouraged as tolerated  Verified date/time/location of upcoming POV and reminded her to complete x-rays prior to her visit on 2/8/2019   She is to call the office with any further questions or concerns, or if any incisional issues or fevers would arise

## 2019-02-18 ENCOUNTER — TELEPHONE (OUTPATIENT)
Dept: NEUROSURGERY | Facility: CLINIC | Age: 34
End: 2019-02-18

## 2019-02-18 DIAGNOSIS — Z48.89 ENCOUNTER FOR POSTOPERATIVE CARE: Primary | ICD-10-CM

## 2019-02-18 NOTE — TELEPHONE ENCOUNTER
Patient had a disc arthroplasty on 1/25/2019  No studies were ordered for her 6 week post-op but a XR spine cervical 2 or 3 vw injury is needed for this visit  I will place order and notify patient as well

## 2019-03-11 ENCOUNTER — HOSPITAL ENCOUNTER (OUTPATIENT)
Dept: RADIOLOGY | Facility: HOSPITAL | Age: 34
Discharge: HOME | End: 2019-03-11
Attending: NEUROLOGICAL SURGERY
Payer: COMMERCIAL

## 2019-03-11 ENCOUNTER — TRANSCRIBE ORDERS (OUTPATIENT)
Dept: RADIOLOGY | Facility: HOSPITAL | Age: 34
End: 2019-03-11

## 2019-03-11 DIAGNOSIS — Z48.89 ENCOUNTER FOR POSTOPERATIVE CARE: ICD-10-CM

## 2019-03-11 PROCEDURE — 72040 X-RAY EXAM NECK SPINE 2-3 VW: CPT

## 2019-03-12 ENCOUNTER — OFFICE VISIT (OUTPATIENT)
Dept: NEUROSURGERY | Facility: CLINIC | Age: 34
End: 2019-03-12

## 2019-03-12 VITALS
HEART RATE: 66 BPM | SYSTOLIC BLOOD PRESSURE: 109 MMHG | HEIGHT: 63 IN | BODY MASS INDEX: 19.67 KG/M2 | TEMPERATURE: 98.5 F | WEIGHT: 111 LBS | DIASTOLIC BLOOD PRESSURE: 63 MMHG | RESPIRATION RATE: 16 BRPM

## 2019-03-12 DIAGNOSIS — Z47.89 AFTERCARE FOLLOWING SURGERY OF THE MUSCULOSKELETAL SYSTEM: Primary | ICD-10-CM

## 2019-03-12 DIAGNOSIS — G95.0 SYRINGOMYELIA (HCC): ICD-10-CM

## 2019-03-12 PROCEDURE — 99024 POSTOP FOLLOW-UP VISIT: CPT | Performed by: NEUROLOGICAL SURGERY

## 2019-03-12 NOTE — LETTER
March 12, 2019     Patient: Kiera Gibson   YOB: 1985   Date of Visit: 3/12/2019       To Whom it May Concern:    Kiera Gibson is under my professional care  She was seen in my office on 3/12/2019  She may return to work on 3/18/2019  If you have any questions or concerns, please don't hesitate to call           Sincerely,          Janeth Quinn MD

## 2019-03-12 NOTE — PROGRESS NOTES
[facility] Neurosurgery Office Note  Kathy Cuba is a 35 y o  female      Type of Visit: Post-op      Diagnoses and all orders for this visit:    Aftercare following surgery of the musculoskeletal system  -     MRI cervical spine without contrast; Future    Syringomyelia ([facility] )  -     MRI cervical spine without contrast; Future        DISCUSSION SUMMARY  58-year-old patient with a herniated disc who is status post a disc arthroplasty who in general is slightly improved following this procedure  A 4th she did have a Chiari malformation with decompression and a large cervical syrinx which has left her with chronic pain in her neck and body  She has difficulty sleeping at night with pain erupting from movement  This undoubtedly is produced by the syringomyelia  Imaging studies of the disc arthroplasty demonstrated is in good position  Will plan on seeing her back in the office in 6 months time to follow this along  Patient wants to return back to work on Monday and I have written for this to occur  CHIEF COMPLAINT  Patient presents for 6 week post-op    NEUROSURGERY PROCEDURES  9/16/11 (Dr Grant Dodge): Chiari decompression without C1 laminectomy  12/14/16 (DKO) Suboccipital decompressive craniectomy, C1 laminectomy and partial C2 laminectomy and duraplasty with placement of fourth ventricular to subarachnoid shunt  1/25/19 (DKO) Anterior cervical discectomy and total disc arthroplasty C5-6  HISTORY OF PRESENT ILLNESS  Patient has history of Chiari malformation and a large holocord syringomyelia  The syrinx had not improved and she continued to be symptomatic via pain of this  It is for these reasons that we recommended surgical intervention  On 12/14/16, patient underwent a Suboccipital decompressive craniectomy, C1 laminectomy and partial C2 laminectomy and duraplasty with placement of fourth ventricular to subarachnoid shunt by Dr Adrienne Parker   Patient was advised continued follow up for this  The routine scheduled MRI demonstrated a large herniated cervical disc with cord contact  In the setting of ongoing syringomyelia and worsening pain we recommended surgical intervention for decompression and the placement of an artificial disc  To OR on 1/25/2019, as noted above  REVIEW OF SYSTEMS  Review of Systems   Constitutional: Negative  HENT: Negative  Eyes: Negative  Respiratory: Negative  Cardiovascular: Negative  Gastrointestinal: Negative  Endocrine: Negative  Genitourinary: Negative  Musculoskeletal: Positive for neck pain (bilateral into shoulders and arms which is unchanged from surgery)  Skin: Negative  Allergic/Immunologic: Negative  Neurological: Positive for numbness (and tingling in both arms which is also unchanged)  Hematological: Negative  Psychiatric/Behavioral: Negative  All other systems reviewed and are negative  I reviewed the ROS      MEDICAL HISTORY  Active Ambulatory Problems     Diagnosis Date Noted    Chiari malformation type I ([facility] ) 12/14/2016    Syringomyelia ([facility] ) 12/14/2016    Encounter for annual routine gynecological examination 10/15/2018    Routine screening for STI (sexually transmitted infection) 10/15/2018    Vaginal discharge 10/15/2018    Irregular menses 10/15/2018    History of Chiari malformation 10/15/2018    Pap smear abnormality of cervix with LGSIL 11/08/2018    Cervical high risk HPV (human papillomavirus) test positive 11/08/2018    Herniated disc, cervical 12/13/2018    Myelopathy ([address] ) 12/13/2018    Radiculopathy, cervical 12/13/2018    Abnormal urinalysis 01/09/2019    Microscopic hematuria 01/09/2019     Resolved Ambulatory Problems     Diagnosis Date Noted    No Resolved Ambulatory Problems     Past Medical History:   Diagnosis Date    Chiari I malformation ([address] )     UTI (urinary tract infection)        Past Surgical History:   Procedure Laterality Date    BACK SURGERY      BRAIN SURGERY      SD SUBOCCIPT DECOMP MEDULLA/SP CRD N/A 12/14/2016    Procedure: REPEAT SUBOCCIPITAL CRANIECTOMY; C1 LAMINECTOMY AND DURAPLASTY; PLACEMENT OF 4TH VENTRICULAR-SUBARACHNOID SHUNT; IMPULSE MONITORING;  Surgeon: Henrique Bajwa MD;  [facility];  Service: Neurosurgery    SD TOTAL [address], CERVICAL, SINGLE N/A 1/25/2019    Procedure: Anterior cervical discectomy and total disc arthroplasty C5-6;  Surgeon: Henrique Bajwa MD;  Location: [facility];  Service: Neurosurgery       Social History     Tobacco Use   Smoking Status Never Smoker   Smokeless Tobacco Never Used       Social History     Substance and Sexual Activity   Alcohol Use Yes    Comment: rare / special occasions        Social History     Substance and Sexual Activity   Drug Use No       Vitals:    03/12/19 1559   BP: 109/63   BP Location: Right arm   Patient Position: Sitting   Cuff Size: Adult   Pulse: 66   Resp: 16   Temp: 98 5 °F (36 9 °C)   TempSrc: Tympanic   Weight: 50 3 kg (111 lb)   Height: 5' 3" (1 6 m)       No current outpatient medications on file  Allergies   Allergen Reactions    Cephalexin      Annotation - [id number]: gives hives    Sunscreens Hives        The following portions of the patient's history were updated by MA and reviewed by MD: allergies, current medications, past family history, past medical history, past social history, past surgical history and problem list       Physical Exam  Awake and alert  Ambulation station and gait are normal  Incision is clean dry and healing well    RESULTS/DATA  X-ray of the cervical spine is compared with a MRI performed preoperatively    This demonstrates instrumentation to be in adequate position without signs of loosening

## 2019-05-12 PROBLEM — O36.80X0 PREGNANCY WITH UNCERTAIN FETAL VIABILITY: Status: ACTIVE | Noted: 2019-05-12

## 2019-05-13 ENCOUNTER — OFFICE VISIT (OUTPATIENT)
Dept: GYNECOLOGY | Facility: CLINIC | Age: 34
End: 2019-05-13
Payer: COMMERCIAL

## 2019-05-13 VITALS
HEART RATE: 91 BPM | HEIGHT: 63 IN | SYSTOLIC BLOOD PRESSURE: 98 MMHG | BODY MASS INDEX: 20.2 KG/M2 | WEIGHT: 114 LBS | DIASTOLIC BLOOD PRESSURE: 64 MMHG

## 2019-05-13 DIAGNOSIS — O21.9 NAUSEA AND VOMITING DURING PREGNANCY: ICD-10-CM

## 2019-05-13 DIAGNOSIS — N91.2 AMENORRHEA: ICD-10-CM

## 2019-05-13 DIAGNOSIS — O36.80X0 PREGNANCY WITH UNCERTAIN FETAL VIABILITY, SINGLE OR UNSPECIFIED FETUS: Primary | ICD-10-CM

## 2019-05-13 LAB — SL AMB POCT URINE HCG: POSITIVE

## 2019-05-13 PROCEDURE — 87591 N.GONORRHOEAE DNA AMP PROB: CPT | Performed by: OBSTETRICS & GYNECOLOGY

## 2019-05-13 PROCEDURE — 76801 OB US < 14 WKS SINGLE FETUS: CPT | Performed by: OBSTETRICS & GYNECOLOGY

## 2019-05-13 PROCEDURE — 81025 URINE PREGNANCY TEST: CPT | Performed by: OBSTETRICS & GYNECOLOGY

## 2019-05-13 PROCEDURE — 87491 CHLMYD TRACH DNA AMP PROBE: CPT | Performed by: OBSTETRICS & GYNECOLOGY

## 2019-05-17 ENCOUNTER — TELEPHONE (OUTPATIENT)
Dept: NEUROSURGERY | Facility: CLINIC | Age: 34
End: 2019-05-17

## 2019-05-17 LAB
C TRACH DNA SPEC QL NAA+PROBE: NEGATIVE
N GONORRHOEA DNA SPEC QL NAA+PROBE: NEGATIVE

## 2019-06-05 ENCOUNTER — INITIAL PRENATAL (OUTPATIENT)
Dept: GYNECOLOGY | Facility: CLINIC | Age: 34
End: 2019-06-05

## 2019-06-05 VITALS
SYSTOLIC BLOOD PRESSURE: 90 MMHG | WEIGHT: 116.2 LBS | BODY MASS INDEX: 19.84 KG/M2 | HEIGHT: 64 IN | DIASTOLIC BLOOD PRESSURE: 60 MMHG

## 2019-06-05 DIAGNOSIS — Z34.91 PREGNANCY, OBSTETRICAL CARE, FIRST TRIMESTER: Primary | ICD-10-CM

## 2019-06-05 PROCEDURE — OBC: Performed by: OBSTETRICS & GYNECOLOGY

## 2019-06-15 ENCOUNTER — LAB (OUTPATIENT)
Dept: LAB | Age: 34
End: 2019-06-15
Payer: COMMERCIAL

## 2019-06-15 DIAGNOSIS — Z34.91 PREGNANCY, OBSTETRICAL CARE, FIRST TRIMESTER: ICD-10-CM

## 2019-06-15 LAB
ABO GROUP BLD: NORMAL
BACTERIA UR QL AUTO: ABNORMAL /HPF
BASOPHILS # BLD AUTO: 0.02 THOUSANDS/ΜL (ref 0–0.1)
BASOPHILS NFR BLD AUTO: 0 % (ref 0–1)
BILIRUB UR QL STRIP: NEGATIVE
BLD GP AB SCN SERPL QL: NEGATIVE
CLARITY UR: ABNORMAL
COLOR UR: YELLOW
EOSINOPHIL # BLD AUTO: 0.08 THOUSAND/ΜL (ref 0–0.61)
EOSINOPHIL NFR BLD AUTO: 1 % (ref 0–6)
ERYTHROCYTE [DISTWIDTH] IN BLOOD BY AUTOMATED COUNT: 12.5 % (ref 11.6–15.1)
GLUCOSE UR STRIP-MCNC: NEGATIVE MG/DL
HBV SURFACE AG SER QL: NORMAL
HCT VFR BLD AUTO: 39.6 % (ref 34.8–46.1)
HGB BLD-MCNC: 13.4 G/DL (ref 11.5–15.4)
HGB UR QL STRIP.AUTO: ABNORMAL
HYALINE CASTS #/AREA URNS LPF: ABNORMAL /LPF
IMM GRANULOCYTES # BLD AUTO: 0.02 THOUSAND/UL (ref 0–0.2)
IMM GRANULOCYTES NFR BLD AUTO: 0 % (ref 0–2)
KETONES UR STRIP-MCNC: ABNORMAL MG/DL
LEUKOCYTE ESTERASE UR QL STRIP: NEGATIVE
LYMPHOCYTES # BLD AUTO: 0.99 THOUSANDS/ΜL (ref 0.6–4.47)
LYMPHOCYTES NFR BLD AUTO: 14 % (ref 14–44)
MCH RBC QN AUTO: 30.1 PG (ref 26.8–34.3)
MCHC RBC AUTO-ENTMCNC: 33.8 G/DL (ref 31.4–37.4)
MCV RBC AUTO: 89 FL (ref 82–98)
MONOCYTES # BLD AUTO: 0.34 THOUSAND/ΜL (ref 0.17–1.22)
MONOCYTES NFR BLD AUTO: 5 % (ref 4–12)
NEUTROPHILS # BLD AUTO: 5.79 THOUSANDS/ΜL (ref 1.85–7.62)
NEUTS SEG NFR BLD AUTO: 80 % (ref 43–75)
NITRITE UR QL STRIP: NEGATIVE
NON-SQ EPI CELLS URNS QL MICRO: ABNORMAL /HPF
NRBC BLD AUTO-RTO: 0 /100 WBCS
PH UR STRIP.AUTO: 6 [PH]
PLATELET # BLD AUTO: 192 THOUSANDS/UL (ref 149–390)
PMV BLD AUTO: 11.2 FL (ref 8.9–12.7)
PROT UR STRIP-MCNC: NEGATIVE MG/DL
RBC # BLD AUTO: 4.45 MILLION/UL (ref 3.81–5.12)
RBC #/AREA URNS AUTO: ABNORMAL /HPF
RH BLD: POSITIVE
RUBV IGG SERPL IA-ACNC: 60.3 IU/ML
SP GR UR STRIP.AUTO: 1.03 (ref 1–1.03)
SPECIMEN EXPIRATION DATE: NORMAL
UROBILINOGEN UR QL STRIP.AUTO: 1 E.U./DL
WBC # BLD AUTO: 7.24 THOUSAND/UL (ref 4.31–10.16)
WBC #/AREA URNS AUTO: ABNORMAL /HPF

## 2019-06-15 PROCEDURE — 80081 OBSTETRIC PANEL INC HIV TSTG: CPT

## 2019-06-15 PROCEDURE — 81001 URINALYSIS AUTO W/SCOPE: CPT

## 2019-06-15 PROCEDURE — 87086 URINE CULTURE/COLONY COUNT: CPT

## 2019-06-15 PROCEDURE — 36415 COLL VENOUS BLD VENIPUNCTURE: CPT

## 2019-06-15 PROCEDURE — 83020 HEMOGLOBIN ELECTROPHORESIS: CPT

## 2019-06-16 LAB — BACTERIA UR CULT: ABNORMAL

## 2019-06-17 ENCOUNTER — ROUTINE PRENATAL (OUTPATIENT)
Dept: GYNECOLOGY | Facility: CLINIC | Age: 34
End: 2019-06-17

## 2019-06-17 VITALS
SYSTOLIC BLOOD PRESSURE: 102 MMHG | BODY MASS INDEX: 20.33 KG/M2 | HEART RATE: 93 BPM | WEIGHT: 116.6 LBS | DIASTOLIC BLOOD PRESSURE: 78 MMHG

## 2019-06-17 DIAGNOSIS — R31.21 ASYMPTOMATIC MICROSCOPIC HEMATURIA: ICD-10-CM

## 2019-06-17 DIAGNOSIS — Z3A.12 12 WEEKS GESTATION OF PREGNANCY: ICD-10-CM

## 2019-06-17 DIAGNOSIS — Z34.81 ENCOUNTER FOR SUPERVISION OF OTHER NORMAL PREGNANCY IN FIRST TRIMESTER: Primary | ICD-10-CM

## 2019-06-17 PROBLEM — Z34.90 SUPERVISION OF NORMAL PREGNANCY: Status: ACTIVE | Noted: 2019-06-17

## 2019-06-17 LAB
HIV 1+2 AB+HIV1 P24 AG SERPL QL IA: NORMAL
RPR SER QL: NORMAL

## 2019-06-17 PROCEDURE — PNV: Performed by: NURSE PRACTITIONER

## 2019-06-18 LAB
DEPRECATED HGB OTHER BLD-IMP: 0 %
HGB A MFR BLD: 2.5 % (ref 1.8–3.2)
HGB A MFR BLD: 97.5 % (ref 96.4–98.8)
HGB C MFR BLD: 0 %
HGB F MFR BLD: 0 % (ref 0–2)
HGB FRACT BLD-IMP: NORMAL
HGB S BLD QL SOLY: NEGATIVE
HGB S MFR BLD: 0 %

## 2019-07-01 ENCOUNTER — TELEPHONE (OUTPATIENT)
Dept: GYNECOLOGY | Facility: CLINIC | Age: 34
End: 2019-07-01

## 2019-07-01 NOTE — TELEPHONE ENCOUNTER
C/o being 14 weeks pregnant and had a 60 pound dog jump on her belly twice  She has had some mild cramping  She spoke to the on call Dr over the weekend and he just recommended she not let the dog jump on her again  Still has some slight cramping, has not had any bleeding, next appointment is not until 7/15

## 2019-07-01 NOTE — TELEPHONE ENCOUNTER
Spoke with patient who denies vaginal bleeding or fluid leaking  Slight cramping  May take tylenol and aware at this gestational age there is no other intervention needed  Avoid additional trauma and rest as needed

## 2019-07-12 ENCOUNTER — TELEPHONE (OUTPATIENT)
Dept: GYNECOLOGY | Facility: CLINIC | Age: 34
End: 2019-07-12

## 2019-07-12 ENCOUNTER — ROUTINE PRENATAL (OUTPATIENT)
Dept: GYNECOLOGY | Facility: CLINIC | Age: 34
End: 2019-07-12

## 2019-07-12 VITALS
BODY MASS INDEX: 20.66 KG/M2 | SYSTOLIC BLOOD PRESSURE: 120 MMHG | WEIGHT: 121 LBS | HEIGHT: 64 IN | HEART RATE: 102 BPM | DIASTOLIC BLOOD PRESSURE: 72 MMHG

## 2019-07-12 DIAGNOSIS — Z34.82 ENCOUNTER FOR SUPERVISION OF OTHER NORMAL PREGNANCY IN SECOND TRIMESTER: Primary | ICD-10-CM

## 2019-07-12 DIAGNOSIS — Z3A.16 16 WEEKS GESTATION OF PREGNANCY: ICD-10-CM

## 2019-07-12 PROCEDURE — PNV: Performed by: NURSE PRACTITIONER

## 2019-07-12 NOTE — TELEPHONE ENCOUNTER
Patient called  [facility] 12/26/2019  States she is 16 weeks and has been feeling movement  Says she hasn't felt anything today  Also complaining of pelvic pain  Says she left work and was going to go to urgent care  Wants to know what to do

## 2019-07-12 NOTE — PROGRESS NOTES
Here today for a problem OB visit with complaints of no fetal movement and states she typically feels the baby move every day  Woke up this am at 0200 with lower pelvic pain bilaterally rating pain 6/10  The pain has been intermittent since and described as sharp and rated 6/10  now  Denies vaginal bleeding or fluid leaking  Last coitus yesterday morning with no dyspareunia  She did have a large dog (60 lbs) jump onto her abdomen approx 2 weeks ago  No abnormal symptoms at that time       MFM appt at 20 weeks for fetal anatomic US  Complete UA CS

## 2019-08-08 ENCOUNTER — ROUTINE PRENATAL (OUTPATIENT)
Dept: PERINATAL CARE | Facility: OTHER | Age: 34
End: 2019-08-08
Payer: COMMERCIAL

## 2019-08-08 VITALS
HEART RATE: 100 BPM | WEIGHT: 124 LBS | DIASTOLIC BLOOD PRESSURE: 77 MMHG | BODY MASS INDEX: 21.17 KG/M2 | HEIGHT: 64 IN | SYSTOLIC BLOOD PRESSURE: 112 MMHG

## 2019-08-08 DIAGNOSIS — O43.92 ABNORMAL PLACENTA AFFECTING MANAGEMENT OF MOTHER IN SECOND TRIMESTER: ICD-10-CM

## 2019-08-08 DIAGNOSIS — Z36.86 ENCOUNTER FOR ANTENATAL SCREENING FOR CERVICAL LENGTH: ICD-10-CM

## 2019-08-08 DIAGNOSIS — Z3A.20 20 WEEKS GESTATION OF PREGNANCY: ICD-10-CM

## 2019-08-08 DIAGNOSIS — Z86.69 HISTORY OF CHIARI MALFORMATION: ICD-10-CM

## 2019-08-08 DIAGNOSIS — Z34.82 ENCOUNTER FOR SUPERVISION OF OTHER NORMAL PREGNANCY IN SECOND TRIMESTER: ICD-10-CM

## 2019-08-08 DIAGNOSIS — O35.8XX0 PYELECTASIS OF FETUS ON PRENATAL ULTRASOUND: Primary | ICD-10-CM

## 2019-08-08 PROBLEM — O35.EXX0 PYELECTASIS OF FETUS ON PRENATAL ULTRASOUND: Status: ACTIVE | Noted: 2019-08-08

## 2019-08-08 PROCEDURE — 76811 OB US DETAILED SNGL FETUS: CPT | Performed by: OBSTETRICS & GYNECOLOGY

## 2019-08-08 PROCEDURE — 76817 TRANSVAGINAL US OBSTETRIC: CPT | Performed by: OBSTETRICS & GYNECOLOGY

## 2019-08-08 PROCEDURE — 99241 PR OFFICE CONSULTATION NEW/ESTAB PATIENT 15 MIN: CPT | Performed by: OBSTETRICS & GYNECOLOGY

## 2019-08-08 NOTE — LETTER
August 8, 2019     Argentina Gomez [address]   [address] [place] [address]    Patient: Vickie Bolaños   YOB: 1985   Date of Visit: 8/8/2019       Dear Dr Rudi Stanford: Thank you for referring Vickie Bolaños to me for evaluation  Below are my notes for this consultation  If you have questions, please do not hesitate to call me  I look forward to following your patient along with you  Sincerely,        Elisabeth De La Paz MD        CC: No Recipients  Elisabeth De La Paz MD  8/8/2019  8:15 PM  Sign at close encounter    Please refer to the [facility] ultrasound report in Ob Procedures for additional information regarding the visit to the [facility]  today    Elisabeth De La Paz MD

## 2019-08-08 NOTE — PROGRESS NOTES
Please refer to the [facility] ultrasound report in Ob Procedures for additional information regarding the visit to the [facility]  today    Cammie Womack MD

## 2019-08-09 ENCOUNTER — ROUTINE PRENATAL (OUTPATIENT)
Dept: OBGYN CLINIC | Facility: CLINIC | Age: 34
End: 2019-08-09

## 2019-08-09 ENCOUNTER — TELEPHONE (OUTPATIENT)
Dept: NEUROSURGERY | Facility: CLINIC | Age: 34
End: 2019-08-09

## 2019-08-09 VITALS — DIASTOLIC BLOOD PRESSURE: 64 MMHG | BODY MASS INDEX: 21.66 KG/M2 | WEIGHT: 124.2 LBS | SYSTOLIC BLOOD PRESSURE: 102 MMHG

## 2019-08-09 DIAGNOSIS — Z34.82 ENCOUNTER FOR SUPERVISION OF OTHER NORMAL PREGNANCY IN SECOND TRIMESTER: Primary | ICD-10-CM

## 2019-08-09 DIAGNOSIS — O35.8XX0 PYELECTASIS OF FETUS ON PRENATAL ULTRASOUND: ICD-10-CM

## 2019-08-09 DIAGNOSIS — O99.891 BACK PAIN AFFECTING PREGNANCY IN SECOND TRIMESTER: ICD-10-CM

## 2019-08-09 DIAGNOSIS — R87.612 PAP SMEAR ABNORMALITY OF CERVIX WITH LGSIL: ICD-10-CM

## 2019-08-09 DIAGNOSIS — M54.9 BACK PAIN AFFECTING PREGNANCY IN SECOND TRIMESTER: ICD-10-CM

## 2019-08-09 DIAGNOSIS — Z3A.20 20 WEEKS GESTATION OF PREGNANCY: ICD-10-CM

## 2019-08-09 DIAGNOSIS — Z86.69 HISTORY OF CHIARI MALFORMATION: ICD-10-CM

## 2019-08-09 DIAGNOSIS — O43.92 ABNORMAL PLACENTA AFFECTING MANAGEMENT OF MOTHER IN SECOND TRIMESTER: ICD-10-CM

## 2019-08-09 LAB
SL AMB  POCT GLUCOSE, UA: NORMAL
SL AMB POCT URINE PROTEIN: NORMAL

## 2019-08-09 PROCEDURE — PNV: Performed by: NURSE PRACTITIONER

## 2019-08-09 NOTE — ASSESSMENT & PLAN NOTE
Hx of Chiari malformation requiring 3 surgeries  These were all after her 2 prior pregnancies and vaginal deliveries  9/16/11 (Dr Margarita Beck): Chiari decompression without C1 laminectomy  12/14/16 (DKO) Suboccipital decompressive craniectomy, C1 laminectomy and  partial C2 laminectomy and duraplasty with placement of fourth ventricular  to subarachnoid shunt  1/25/19 (DKO) Anterior cervical discectomy and total disc arthroplasty  C5-6  Recommend patient keep f/u appointment scheduled in September with her neurosurgeon  She is already experiencing a lot of back pain with this pregnancy  Recommend PT referral and patient is agreeable to this    Discussed appointment in the 3rd trimester with Dr Mariscal to discuss epidural

## 2019-08-09 NOTE — ASSESSMENT & PLAN NOTE
Ary is an OB transfer from Dr Grimes's office at 20w1d  Hx of 2 prior  12 and 9 years ago  This is her significant others first baby  She has c/o of a lot of back pain  Sleeping in a recliner chair at night d/t discomfort  Hx of 3 prior surgeries d/t chiari malformation  Good fetal movement  Denies contractions, cramping, leakage of fluid or vaginal bleeding  Had level 2 ultrasound yesterday  Will have repeat ultrasounds at 28 and 34 weeks for fetal growth, left-sided pyelectasis, and missed anatomy  Having a gender reveal next weekend  Oriented patient to our practice  Reviewed blue folder  Reviewed reasons to call  RTO in 4 weeks

## 2019-08-09 NOTE — PROGRESS NOTES
Problem List Items Addressed This Visit        Other    History of Chiari malformation     Hx of Chiari malformation requiring 3 surgeries  These were all after her 2 prior pregnancies and vaginal deliveries  11 (Dr Edvin Rayo): Chiari decompression without C1 laminectomy  16 (DKO) Suboccipital decompressive craniectomy, C1 laminectomy and  partial C2 laminectomy and duraplasty with placement of fourth ventricular  to subarachnoid shunt  19 (DKO) Anterior cervical discectomy and total disc arthroplasty  C5-6  Recommend patient keep f/u appointment scheduled in September with her neurosurgeon  She is already experiencing a lot of back pain with this pregnancy  Recommend PT referral and patient is agreeable to this  Discussed appointment in the 3rd trimester with Dr Mariscal to discuss epidural          Pap smear abnormality of cervix with LGSIL     Pap 10/15/18 LSIL, + HPV  Colpo 18 -PALLAVI 1  Discussed repeat pap 6 weeks postpartum  Supervision of normal pregnancy - Primary     Ary is an OB transfer from Dr Grimes's office at 20w1d  Hx of 2 prior  12 and 9 years ago  This is her significant others first baby  She has c/o of a lot of back pain  Sleeping in a recliner chair at night d/t discomfort  Hx of 3 prior surgeries d/t chiari malformation  Good fetal movement  Denies contractions, cramping, leakage of fluid or vaginal bleeding  Had level 2 ultrasound yesterday  Will have repeat ultrasounds at 28 and 34 weeks for fetal growth, left-sided pyelectasis, and missed anatomy  Having a gender reveal next weekend  Oriented patient to our practice  Reviewed blue folder  Reviewed reasons to call  RTO in 4 weeks  Relevant Orders    POCT urine dip    20 weeks gestation of pregnancy    Abnormal placenta affecting management of mother in second trimester     Marginal cord insertion of placenta  F/u ultrasounds scheduled at 28 and 34 weeks           Pyelectasis of fetus on prenatal ultrasound     Mild left pyelectasis on level 2 ultrasound  Will have f/u ultrasounds at 28 and 34 weeks            Back pain affecting pregnancy in second trimester    Relevant Orders    Ambulatory referral to Physical Therapy

## 2019-08-09 NOTE — TELEPHONE ENCOUNTER
Called patient to notify her how the office is working on this and how the office will call her once we hear back from the MD  She was appreciative  Notified patient if the pain is severe then to present to ED  She understood

## 2019-08-09 NOTE — PROGRESS NOTES
Patient is doing well; complaints of back pain  She recently had back surgery in January and 2 previous surgeries before that  Level 2 was done yesterday 8/8/19    Urine protein-neg  Urine glucose-neg

## 2019-08-09 NOTE — PROGRESS NOTES
Please forward her report from Dr Renaldo Zarate to the office where she is continuing her obstetric care

## 2019-08-09 NOTE — TELEPHONE ENCOUNTER
----- Message from Joel Mcgrath sent at 8/9/2019  9:53 AM EDT -----  Regarding: Pre-Op/Post-Op Question  Contact: [phone]  Good morning,     I wanted to reach out because I spoke to my OB about some issues I am having with my back/neck as a result of my pregnancy  She suggested that I reach out to see if I can follow up regarding these issues, as I had surgery most recently in January  I am having issues with mobility, I have extreme pain laying down or even laying reclined  My pain is at an 8-8 5 most days from continuous walking, bending, reaching and just sitting with the additional weight pulling me forward from my growing bump  I have followed some general suggestions such as bringing a cushion for my work chair, a stool for my feet, walking around to stretch every hour or so and none of these things are providing relief/lessening the pain  It is becoming difficult to get out of bed and to sit at work all day  I just want to know how this is going to affect my spine/neck continuing on as I am only anticipating getting larger

## 2019-08-12 NOTE — TELEPHONE ENCOUNTER
Discussed with Garth Huitron PA-C  Ed said from a neurosurgical standpoint, there is nothing we can do at this time  Ed said once she delivers the baby, she will most likely return to baseline  Called patient to notify her of his response  Patient also inquired if it is safe for her to receive an epidural during labor  Discussed with Ed and he said yes it is safe to receive an epidural  Patient was unsatisfied with response and is requesting for this to be discussed with Dr Sheldon Pritchard  Notified patient how the message was sent to Dr Sheldon Pritchard and how we will send the message again to him  She was appreciative

## 2019-08-12 NOTE — TELEPHONE ENCOUNTER
I answered this question directly with the patient's OBGYN - see that string  There is a slightly increased risk of dural CSF leak and worsening symptoms of Chiari because of this  I  the risk to be relatively low

## 2019-08-12 NOTE — TELEPHONE ENCOUNTER
Called patient and informed her of Dr Dede Mclaughlin recommendation  Patient understood and was appreciative  Canceled MRI on 9/10/19 since the patient is pregnant  Also canceled the appointment with Dr Pedro Gutierrez on 9/12/19  Dr Pedro Gutierrez said for the patient to call our office after she delivers the baby to arrange follow up  Patient understood and was appreciative

## 2019-09-06 ENCOUNTER — ROUTINE PRENATAL (OUTPATIENT)
Dept: OBGYN CLINIC | Facility: CLINIC | Age: 34
End: 2019-09-06

## 2019-09-06 VITALS — BODY MASS INDEX: 22.49 KG/M2 | WEIGHT: 129 LBS | SYSTOLIC BLOOD PRESSURE: 100 MMHG | DIASTOLIC BLOOD PRESSURE: 62 MMHG

## 2019-09-06 DIAGNOSIS — Z34.82 MULTIGRAVIDA IN SECOND TRIMESTER: ICD-10-CM

## 2019-09-06 DIAGNOSIS — Q07.00 ARNOLD-CHIARI MALFORMATION (HCC): Primary | ICD-10-CM

## 2019-09-06 DIAGNOSIS — Z34.82 ENCOUNTER FOR SUPERVISION OF OTHER NORMAL PREGNANCY IN SECOND TRIMESTER: ICD-10-CM

## 2019-09-06 PROBLEM — Z3A.20 20 WEEKS GESTATION OF PREGNANCY: Status: RESOLVED | Noted: 2019-06-17 | Resolved: 2019-09-06

## 2019-09-06 LAB
SL AMB  POCT GLUCOSE, UA: NORMAL
SL AMB POCT URINE PROTEIN: NORMAL

## 2019-09-06 PROCEDURE — PNV: Performed by: PHYSICIAN ASSISTANT

## 2019-09-06 NOTE — PROGRESS NOTES
Feels well, she has no complaints   Nl FM  28 wk labs and breast bump order faxed  Urine protein -  Urine glucose -

## 2019-09-06 NOTE — PROGRESS NOTES
Problem List Items Addressed This Visit        Nervous and Auditory    Arnold-Chiari malformation ([facility] ) - Primary     Per neurosurgery, okay for epidural (low risk)  Has anesthesia consult in third trimester            Other    Multigravida in second trimester     29 y o  female here for routine PN visit at [facility] well overall  Good fetal movement  28 week lab slip given  Has follow-up for missed views, left pyelectasis

## 2019-09-06 NOTE — ASSESSMENT & PLAN NOTE
29 y o  female here for routine PN visit at [facility] well overall  Good fetal movement  28 week lab slip given  Has follow-up for missed views, left pyelectasis

## 2019-10-01 ENCOUNTER — ROUTINE PRENATAL (OUTPATIENT)
Dept: OBGYN CLINIC | Facility: CLINIC | Age: 34
End: 2019-10-01

## 2019-10-01 VITALS — SYSTOLIC BLOOD PRESSURE: 104 MMHG | WEIGHT: 136 LBS | DIASTOLIC BLOOD PRESSURE: 68 MMHG | BODY MASS INDEX: 23.71 KG/M2

## 2019-10-01 DIAGNOSIS — Z34.82 PRENATAL CARE, SUBSEQUENT PREGNANCY, SECOND TRIMESTER: Primary | ICD-10-CM

## 2019-10-01 LAB
SL AMB  POCT GLUCOSE, UA: NEGATIVE
SL AMB POCT URINE PROTEIN: ABNORMAL

## 2019-10-01 PROCEDURE — PNV: Performed by: STUDENT IN AN ORGANIZED HEALTH CARE EDUCATION/TRAINING PROGRAM

## 2019-10-01 NOTE — PROGRESS NOTES
Patient did not go for labs yet due to her having a very busy schedule  She will try and get them done soon  O positive  Declines flu shot and tdap  Endorses +FM, denies LOF/VB/ctx  Mera Zarate chiari malformation - Did speak with her neurosurgeon who states low risk of epidural given her history and surgeries  Patient herself is undecided regarding whether she desires this or not  Follow up ultrasound for missed views, left pyelectasis, marginal cord insertion this Thursday  Back pain - reviewed positioning, timed Tylenol  Per neurosurgery notes, nothing to do at this time until delivered  Has referral for physical therapy and information

## 2019-10-03 ENCOUNTER — ULTRASOUND (OUTPATIENT)
Dept: PERINATAL CARE | Facility: OTHER | Age: 34
End: 2019-10-03
Payer: COMMERCIAL

## 2019-10-03 VITALS
BODY MASS INDEX: 23.85 KG/M2 | SYSTOLIC BLOOD PRESSURE: 112 MMHG | WEIGHT: 134.6 LBS | DIASTOLIC BLOOD PRESSURE: 63 MMHG | HEIGHT: 63 IN | HEART RATE: 69 BPM

## 2019-10-03 DIAGNOSIS — O40.3XX0 POLYHYDRAMNIOS AFFECTING PREGNANCY IN THIRD TRIMESTER: Primary | ICD-10-CM

## 2019-10-03 DIAGNOSIS — Z86.69 HISTORY OF CHIARI MALFORMATION: ICD-10-CM

## 2019-10-03 DIAGNOSIS — O43.92 ABNORMAL PLACENTA AFFECTING MANAGEMENT OF MOTHER IN SECOND TRIMESTER: ICD-10-CM

## 2019-10-03 DIAGNOSIS — Z3A.28 28 WEEKS GESTATION OF PREGNANCY: ICD-10-CM

## 2019-10-03 DIAGNOSIS — O35.8XX0 PYELECTASIS OF FETUS ON PRENATAL ULTRASOUND: ICD-10-CM

## 2019-10-03 PROBLEM — O35.EXX0 PYELECTASIS OF FETUS ON PRENATAL ULTRASOUND: Status: RESOLVED | Noted: 2019-08-08 | Resolved: 2019-10-03

## 2019-10-03 PROCEDURE — 99212 OFFICE O/P EST SF 10 MIN: CPT | Performed by: OBSTETRICS & GYNECOLOGY

## 2019-10-03 PROCEDURE — 76816 OB US FOLLOW-UP PER FETUS: CPT | Performed by: OBSTETRICS & GYNECOLOGY

## 2019-10-03 NOTE — PROGRESS NOTES
The patient was seen today for an ultrasound  Please see ultrasound report (located under Ob Procedures) for additional details  Thank you very much for allowing us to participate in the care of this very nice patient  Should you have any questions, please do not hesitate to contact me  Shahid Freeman MD [id number] [facility]  Attending Physician, Natacha

## 2019-10-03 NOTE — PATIENT INSTRUCTIONS
Kick Counts in Pregnancy   AMBULATORY CARE:   Kick counts  measure how much your baby is moving in your womb  A kick from your baby can be felt as a twist, turn, swish, roll, or jab  It is common to feel your baby kicking at 26 to 28 weeks of pregnancy  You may feel your baby kick as early as 20 weeks of pregnancy  Seek care immediately if:   · You feel your baby kick less as the day goes on      · You do not feel any kicks in a day  Contact your healthcare provider if:   · You feel a change in the number of kicks or movements of your baby  · You feel fewer than 10 kicks within 2 hours after counting  · You have questions or concerns about your baby's movements  Why measure kick counts:  Your baby's movement may provide information about your baby's health  He may move less, or not at all, if there are problems  He may move less if he does not have enough room to grow in your uterus (womb)  He may also move less if he is not getting enough oxygen or nutrition from the placenta  Tell your healthcare provider as soon as you feel a change in your baby's movements  Problems that are found earlier are easier to treat  When to measure kick counts:   · Measure kick counts at the same time every day  · Measure kick counts when your baby is awake and most active  Your baby may be most active in the evening  · Measure kick counts after a meal or snack or when your baby is usually most active  Your baby may be more active after you eat or in the evening  · You should not smoke while you are pregnant  Smoking increases the risk of health problems for you and for your baby during your pregnancy  If you do smoke, wait 2 hours to measure kick counts  Nicotine can make your baby more active than usual   How to measure kick counts:  Check that your baby is awake before you measure kick counts  You can wake up your baby by lightly pushing on your belly, walking, or drinking something cold   Your healthcare provider may tell you different ways to measure kick counts  He/She may tell you to do the following:  · Use a chart or clock to keep track of the time you start and finish counting  · Sit in a chair or lie on your left side  · Place your hands on the largest part of your belly  · Count until you reach 10 kicks  Write down how much time it takes to count 10 kicks  · It may take 30 minutes to 2 hours to count 10 kicks  It should not take more than 2 hours to count 10 kicks  If you do not feel 10 kicks within 2 hours, Call your Obstetrician    Follow up with your healthcare provider as directed:  Write down your questions so you remember to ask them during your visits  © 2017 [address] [employer] Information is for End User's use only and may not be sold, redistributed or otherwise used for commercial purposes  All illustrations and images included in CareNotes® are the copyrighted property of A D [employer] M [employer] Inc  or Patrick Sigala  The above information is an  only  It is not intended as medical advice for individual conditions or treatments  Talk to your doctor, nurse or pharmacist before following any medical regimen to see if it is safe and effective for you

## 2019-10-12 ENCOUNTER — APPOINTMENT (OUTPATIENT)
Dept: LAB | Age: 34
End: 2019-10-12
Payer: COMMERCIAL

## 2019-10-12 DIAGNOSIS — Z34.82 ENCOUNTER FOR SUPERVISION OF OTHER NORMAL PREGNANCY IN SECOND TRIMESTER: ICD-10-CM

## 2019-10-12 LAB
BASOPHILS # BLD AUTO: 0.02 THOUSANDS/ΜL (ref 0–0.1)
BASOPHILS NFR BLD AUTO: 0 % (ref 0–1)
EOSINOPHIL # BLD AUTO: 0.23 THOUSAND/ΜL (ref 0–0.61)
EOSINOPHIL NFR BLD AUTO: 3 % (ref 0–6)
ERYTHROCYTE [DISTWIDTH] IN BLOOD BY AUTOMATED COUNT: 12.5 % (ref 11.6–15.1)
GLUCOSE 1H P 50 G GLC PO SERPL-MCNC: 135 MG/DL
HCT VFR BLD AUTO: 35.9 % (ref 34.8–46.1)
HGB BLD-MCNC: 12 G/DL (ref 11.5–15.4)
IMM GRANULOCYTES # BLD AUTO: 0.04 THOUSAND/UL (ref 0–0.2)
IMM GRANULOCYTES NFR BLD AUTO: 1 % (ref 0–2)
LYMPHOCYTES # BLD AUTO: 0.9 THOUSANDS/ΜL (ref 0.6–4.47)
LYMPHOCYTES NFR BLD AUTO: 12 % (ref 14–44)
MCH RBC QN AUTO: 30.2 PG (ref 26.8–34.3)
MCHC RBC AUTO-ENTMCNC: 33.4 G/DL (ref 31.4–37.4)
MCV RBC AUTO: 90 FL (ref 82–98)
MONOCYTES # BLD AUTO: 0.3 THOUSAND/ΜL (ref 0.17–1.22)
MONOCYTES NFR BLD AUTO: 4 % (ref 4–12)
NEUTROPHILS # BLD AUTO: 6.11 THOUSANDS/ΜL (ref 1.85–7.62)
NEUTS SEG NFR BLD AUTO: 80 % (ref 43–75)
NRBC BLD AUTO-RTO: 0 /100 WBCS
PLATELET # BLD AUTO: 200 THOUSANDS/UL (ref 149–390)
PMV BLD AUTO: 10.4 FL (ref 8.9–12.7)
RBC # BLD AUTO: 3.97 MILLION/UL (ref 3.81–5.12)
WBC # BLD AUTO: 7.6 THOUSAND/UL (ref 4.31–10.16)

## 2019-10-12 PROCEDURE — 85025 COMPLETE CBC W/AUTO DIFF WBC: CPT

## 2019-10-12 PROCEDURE — 86592 SYPHILIS TEST NON-TREP QUAL: CPT

## 2019-10-12 PROCEDURE — 36415 COLL VENOUS BLD VENIPUNCTURE: CPT

## 2019-10-12 PROCEDURE — 82950 GLUCOSE TEST: CPT

## 2019-10-13 LAB — RPR SER QL: NORMAL

## 2019-10-15 ENCOUNTER — TELEPHONE (OUTPATIENT)
Dept: OBGYN CLINIC | Facility: CLINIC | Age: 34
End: 2019-10-15

## 2019-10-15 DIAGNOSIS — R73.09 ELEVATED GLUCOSE LEVEL: Primary | ICD-10-CM

## 2019-10-15 NOTE — TELEPHONE ENCOUNTER
----- Message from Bennie Baker PA-C sent at 10/15/2019  7:22 AM EDT -----  Please let Ary know that her blood count is normal  Her 1hr gtt is elevated at 135 - she needs a 3hr gtt

## 2019-10-15 NOTE — TELEPHONE ENCOUNTER
Patient returned call - patient is aware of her results and that she needs a 3 hr GTT  Explained it was fasting and done at 3 locations - [place], [place] and [place]  Order already in chart

## 2019-10-18 ENCOUNTER — APPOINTMENT (OUTPATIENT)
Dept: LAB | Facility: HOSPITAL | Age: 34
End: 2019-10-18
Payer: COMMERCIAL

## 2019-10-18 ENCOUNTER — ROUTINE PRENATAL (OUTPATIENT)
Dept: OBGYN CLINIC | Facility: CLINIC | Age: 34
End: 2019-10-18

## 2019-10-18 VITALS — DIASTOLIC BLOOD PRESSURE: 74 MMHG | BODY MASS INDEX: 24.27 KG/M2 | WEIGHT: 137 LBS | SYSTOLIC BLOOD PRESSURE: 122 MMHG

## 2019-10-18 DIAGNOSIS — Z86.69 HISTORY OF CHIARI MALFORMATION: ICD-10-CM

## 2019-10-18 DIAGNOSIS — O40.3XX0 POLYHYDRAMNIOS AFFECTING PREGNANCY IN THIRD TRIMESTER: ICD-10-CM

## 2019-10-18 DIAGNOSIS — O43.92 ABNORMAL PLACENTA AFFECTING MANAGEMENT OF MOTHER IN SECOND TRIMESTER: ICD-10-CM

## 2019-10-18 DIAGNOSIS — Z34.83 ENCOUNTER FOR SUPERVISION OF OTHER NORMAL PREGNANCY IN THIRD TRIMESTER: Primary | ICD-10-CM

## 2019-10-18 DIAGNOSIS — Z34.93 ENCOUNTER FOR PREGNANCY RELATED EXAMINATION IN THIRD TRIMESTER: ICD-10-CM

## 2019-10-18 DIAGNOSIS — R73.09 ELEVATED GLUCOSE LEVEL: ICD-10-CM

## 2019-10-18 DIAGNOSIS — G95.0 SYRINGOMYELIA (HCC): ICD-10-CM

## 2019-10-18 PROBLEM — R31.21 ASYMPTOMATIC MICROSCOPIC HEMATURIA: Status: RESOLVED | Noted: 2019-06-17 | Resolved: 2019-10-18

## 2019-10-18 PROBLEM — R87.810 CERVICAL HIGH RISK HPV (HUMAN PAPILLOMAVIRUS) TEST POSITIVE: Status: RESOLVED | Noted: 2018-11-08 | Resolved: 2019-10-18

## 2019-10-18 LAB
GLUCOSE 1H P 100 G GLC PO SERPL-MCNC: 152 MG/DL (ref 65–179)
GLUCOSE 2H P 100 G GLC PO SERPL-MCNC: 128 MG/DL (ref 65–154)
GLUCOSE 3H P 100 G GLC PO SERPL-MCNC: 118 MG/DL (ref 65–139)
GLUCOSE P FAST SERPL-MCNC: 73 MG/DL (ref 65–99)
SL AMB  POCT GLUCOSE, UA: NEGATIVE
SL AMB POCT URINE PROTEIN: NEGATIVE

## 2019-10-18 PROCEDURE — PNV: Performed by: NURSE PRACTITIONER

## 2019-10-18 PROCEDURE — 82951 GLUCOSE TOLERANCE TEST (GTT): CPT

## 2019-10-18 PROCEDURE — 36415 COLL VENOUS BLD VENIPUNCTURE: CPT

## 2019-10-18 PROCEDURE — 82952 GTT-ADDED SAMPLES: CPT

## 2019-10-18 NOTE — PROGRESS NOTES
Problem List Items Addressed This Visit        Nervous and Auditory    Syringomyelia [facility])    Relevant Orders    Ambulatory referral to Anesthesiology       Other    History of Chiari malformation     Referral provided for anesthesia consultation with Dr Berhane Carvajal  Relevant Orders    Ambulatory referral to Anesthesiology    Supervision of normal pregnancy in third trimester - Primary     Denies OB complaints  Good fetal movement  Denies contractions, cramping, leakage of fluid or vaginal bleeding  Declines flu and tdap vaccines  Baby and Me considerations reinforced  Reviewed  labor precautions and FKCs  Relevant Orders    Ambulatory referral to Anesthesiology    Abnormal placenta affecting management of mother in second trimester     Marginal cord insertion not noted on 10/3/19 growth and JASS  Polyhydramnios affecting pregnancy in third trimester     10/3/19 JASS 26 6  The patient was advised to RTO in 4 weeks for growth and JASS              Other Visit Diagnoses     Encounter for pregnancy related examination in third trimester        Relevant Orders    POCT urine dip (Completed)

## 2019-10-18 NOTE — ASSESSMENT & PLAN NOTE
Denies OB complaints  Good fetal movement  Denies contractions, cramping, leakage of fluid or vaginal bleeding  Declines flu and tdap vaccines  Baby and Me considerations reinforced  Reviewed  labor precautions and FKCs

## 2019-10-28 ENCOUNTER — ROUTINE PRENATAL (OUTPATIENT)
Dept: OBGYN CLINIC | Facility: CLINIC | Age: 34
End: 2019-10-28

## 2019-10-28 VITALS — BODY MASS INDEX: 24.62 KG/M2 | SYSTOLIC BLOOD PRESSURE: 108 MMHG | DIASTOLIC BLOOD PRESSURE: 70 MMHG | WEIGHT: 139 LBS

## 2019-10-28 DIAGNOSIS — Z34.83 ENCOUNTER FOR SUPERVISION OF OTHER NORMAL PREGNANCY IN THIRD TRIMESTER: Primary | ICD-10-CM

## 2019-10-28 LAB
SL AMB  POCT GLUCOSE, UA: NORMAL
SL AMB POCT URINE PROTEIN: NORMAL

## 2019-10-28 PROCEDURE — PNV: Performed by: OBSTETRICS & GYNECOLOGY

## 2019-10-28 NOTE — PROGRESS NOTES
Good FM  Some BH's  No VB  Has follow up sono with  next week  Declined flu and TDAP vaccines  RTO 2 weeks

## 2019-10-31 ENCOUNTER — ULTRASOUND (OUTPATIENT)
Dept: PERINATAL CARE | Facility: CLINIC | Age: 34
End: 2019-10-31
Payer: COMMERCIAL

## 2019-10-31 VITALS
HEART RATE: 89 BPM | BODY MASS INDEX: 23.93 KG/M2 | DIASTOLIC BLOOD PRESSURE: 79 MMHG | SYSTOLIC BLOOD PRESSURE: 117 MMHG | WEIGHT: 140.2 LBS | HEIGHT: 64 IN

## 2019-10-31 DIAGNOSIS — O40.3XX0 POLYHYDRAMNIOS AFFECTING PREGNANCY IN THIRD TRIMESTER: Primary | ICD-10-CM

## 2019-10-31 DIAGNOSIS — Z36.89 ENCOUNTER FOR ULTRASOUND TO CHECK FETAL GROWTH: ICD-10-CM

## 2019-10-31 PROCEDURE — 76816 OB US FOLLOW-UP PER FETUS: CPT | Performed by: OBSTETRICS & GYNECOLOGY

## 2019-10-31 NOTE — PATIENT INSTRUCTIONS
Thank you for choosing us for your  care today  If you have any questions about your ultrasound or care, please do not hesitate to contact us or your primary obstetrician  Some general instructions for your pregnancy are:     Exercise: we encourage most pregnant women to get regular physical activity in pregnancy  Exercise has been shown to reduce the risk of several pregnancy-related complications  Unless instructed otherwise you can aim for 22 minutes per day (150 minutes per week! )   Nutrition: aim for calcium-rich and iron-rich foods as well as healthy sources of protein sources   Weight: ask your doctor what is the appropriate amount of weight for you to gain in pregnancy  We follow [place] [facility] [employer] Guidelines for recommendations  We have nutritionists here if you would like to meet with them   Protection from influenza: get yourself and your entire household vaccinated against influenza  Tell your partner to get vaccinated as well  Good hand hygiene can reduce the spread of this potentially deadly virus  Insist that everyone who is going to hold or be around your baby get vaccinated   Educate yourself about preeclampsia: preeclampsia is a common, serious complication in pregnancy  A blood pressure of 140mmHg (top number or systolic) OR 88YBPB (bottom number or diastolic) is elevated and needs evaluation by your doctor  Ask your doctor early in pregnancy if you should take aspirin (not motrin or tylenol) to prevent preeclampsia  If you were advised to take aspirin to prevent preeclampsia, a daily dose of 162mg or 81mg is advised  One resource is www  preeclampsia org    If you smoke, try to reduce how many cigarettes you smoke  Do not vape       Other warning signs to watch out for in pregnancy or postpartum: chest pain, obstructed breathing or shortness of breath, seizures, thoughts of hurting yourself or your baby, bleeding, a painful or swollen leg, fever, or headache ([facility] POST-BIRTH Warning Signs campaign)  If these happen call [phone]

## 2019-10-31 NOTE — PROGRESS NOTES
[address]: Ms Bucky Ward was seen today at 32w0d for fetal growth assessment ultrasound  See ultrasound report under "OB Procedures" tab  Please don't hesitate to contact our office with any concerns or questions    Amparo Crump MD

## 2019-11-12 ENCOUNTER — ROUTINE PRENATAL (OUTPATIENT)
Dept: OBGYN CLINIC | Facility: CLINIC | Age: 34
End: 2019-11-12

## 2019-11-12 VITALS — DIASTOLIC BLOOD PRESSURE: 82 MMHG | WEIGHT: 143 LBS | SYSTOLIC BLOOD PRESSURE: 120 MMHG | BODY MASS INDEX: 24.93 KG/M2

## 2019-11-12 DIAGNOSIS — Z34.83 ENCOUNTER FOR SUPERVISION OF OTHER NORMAL PREGNANCY IN THIRD TRIMESTER: Primary | ICD-10-CM

## 2019-11-12 DIAGNOSIS — Z86.69 HISTORY OF CHIARI MALFORMATION: ICD-10-CM

## 2019-11-12 DIAGNOSIS — Z34.93 ENCOUNTER FOR PREGNANCY RELATED EXAMINATION IN THIRD TRIMESTER: ICD-10-CM

## 2019-11-12 LAB
SL AMB  POCT GLUCOSE, UA: NEGATIVE
SL AMB POCT URINE PROTEIN: NEGATIVE

## 2019-11-12 PROCEDURE — PNV: Performed by: NURSE PRACTITIONER

## 2019-11-13 ENCOUNTER — CLINICAL SUPPORT (OUTPATIENT)
Dept: PERINATAL CARE | Facility: CLINIC | Age: 34
End: 2019-11-13
Payer: COMMERCIAL

## 2019-11-13 VITALS
BODY MASS INDEX: 25.73 KG/M2 | WEIGHT: 145.2 LBS | DIASTOLIC BLOOD PRESSURE: 76 MMHG | HEIGHT: 63 IN | SYSTOLIC BLOOD PRESSURE: 135 MMHG | HEART RATE: 105 BPM

## 2019-11-13 DIAGNOSIS — O43.92 ABNORMAL PLACENTA AFFECTING MANAGEMENT OF MOTHER IN SECOND TRIMESTER: Primary | ICD-10-CM

## 2019-11-13 DIAGNOSIS — Z34.83 ENCOUNTER FOR SUPERVISION OF OTHER NORMAL PREGNANCY IN THIRD TRIMESTER: ICD-10-CM

## 2019-11-13 DIAGNOSIS — G95.0 SYRINGOMYELIA (HCC): ICD-10-CM

## 2019-11-13 DIAGNOSIS — Z36.9 ENCOUNTER FOR ANTENATAL SCREENING OF MOTHER: ICD-10-CM

## 2019-11-13 DIAGNOSIS — Z86.69 HISTORY OF CHIARI MALFORMATION: ICD-10-CM

## 2019-11-13 PROCEDURE — 99203 OFFICE O/P NEW LOW 30 MIN: CPT | Performed by: ANESTHESIOLOGY

## 2019-11-13 NOTE — PROGRESS NOTES
OB Anesthesia Consult  Ary Damon 29 y o  female MRN: [id number]    HPI  29year old  at 27 6/7 weeks gestation who presents to discuss anesthesia options for her upcoming delivery  She has a history of two prior vaginal deliveries with epidural labor analgesia  She had a long, chronic history of headache and after her last delivery she was seen by neurology for headache workup and after imaging she was diagnosed with Arnold-Chiari malformation  This was corrected at an outside hospital in  however she had a recurrence of "head pressure" and was seen by Dr Merlin Sifuentes who subsequently revised her correction in   She later developed UE numbness and weakness and was diagnosed with syringomyelia  This was corrected in 2019 by Dr Merlin Sifuentes  Since that time she has had minimal neurologic symptoms, she states that periodically she has slight weakness of  strength but it does not impact her  She has chronic back pain which she treated with NSAIDs prior to pregnancy and has not used any pain medications since becoming pregnant  Meds/Allergies     Allergies   Allergen Reactions    Cephalexin      Annotation - [id number]: gives hives    Sunscreens Hives       HR:  [105] 105  BP: (135)/(76) 135/76    Review of Systems   Constitutional: Negative for fever  HEENT: Negative for sore throat  Negative for visual disturbance  Respiratory: Negative for shortness of breath  Cardiovascular: Negative for chest pain  Gastrointestinal: Negative for abdominal pain  Negative for nausea/vomiting  Endocrine: Negative for polyuria  Genitourinary: Negative for dysuria  Musculoskeletal: Negative for arthralgias  Skin: Negative for rash  Allergic/Immunologic: Negative for environmental allergies  Neurological: Negative for dizziness  Hematological: Negative for adenopathy  Psychiatric/Behavioral: Negative for agitation       Physical Exam: /76 (BP Location: Right arm, Patient Position: Sitting, Cuff Size: Standard)   Pulse 105   Ht 5' 3" (1 6 m)   Wt 65 9 kg (145 lb 3 2 oz)   LMP 03/21/2019   BMI 25 72 kg/m²   Gen: Well appearing and well nourished, NAD  Skin: Warm, Dry   HEENT:  PERRLA, EOMI  CV: RRR, +s1/s2, no M/R/G  Pul: Lungs CTAB  Abd: Soft, non-tender, gravid abdomen  Ext:  No cyanosis or edema  Neuro: AAOx3; CN II-XII grossly intact; 5/5 BLUE, 5/5 BLLE; Sensation intact grossly     Lab Results:  Lab Results   Component Value Date    WBC 7 60 10/12/2019    HGB 12 0 10/12/2019    HCT 35 9 10/12/2019    MCV 90 10/12/2019     10/12/2019     Lab Results   Component Value Date    CALCIUM 8 5 01/26/2019    K 3 4 (L) 01/26/2019    CO2 28 01/26/2019     01/26/2019    BUN 11 01/26/2019    CREATININE 0 58 (L) 01/26/2019       Plan:   Ary can safely have an epidural for labor analgesia  She was seen by Dr Ladonna Odonnell during this pregnancy who stated that there is no neurological contraindication to both vaginal delivery or epidural placement  Since she had two previous epidurals prior to her Arnold-Chiari corrections she would certainly be able to have one now that this abnormality has been corrected        SIGNATURE: Verner Leech, MD  DATE: November 13, 2019  TIME: 2:48 PM

## 2019-11-15 NOTE — PROGRESS NOTES
Problem List Items Addressed This Visit        Other    History of Chiari malformation     Anesthesia consultation is scheduled  Supervision of normal pregnancy in third trimester - Primary     Denies OB complaints  Good fetal movement  Denies contractions, cramping, leakage of fluid or vaginal bleeding  Declines flu and tdap vaccines  Baby and Me considerations reinforced  Reviewed  labor precautions and FKCs                Other Visit Diagnoses     Encounter for pregnancy related examination in third trimester        Relevant Orders    POCT urine dip (Completed)

## 2019-11-27 ENCOUNTER — ROUTINE PRENATAL (OUTPATIENT)
Dept: OBGYN CLINIC | Facility: CLINIC | Age: 34
End: 2019-11-27
Payer: COMMERCIAL

## 2019-11-27 VITALS — SYSTOLIC BLOOD PRESSURE: 118 MMHG | BODY MASS INDEX: 25.69 KG/M2 | WEIGHT: 145 LBS | DIASTOLIC BLOOD PRESSURE: 70 MMHG

## 2019-11-27 DIAGNOSIS — Z34.83 PRENATAL CARE, SUBSEQUENT PREGNANCY, THIRD TRIMESTER: Primary | ICD-10-CM

## 2019-11-27 LAB
SL AMB  POCT GLUCOSE, UA: NEGATIVE
SL AMB POCT URINE PROTEIN: NORMAL

## 2019-11-27 PROCEDURE — PNV: Performed by: OBSTETRICS & GYNECOLOGY

## 2019-11-27 PROCEDURE — 59025 FETAL NON-STRESS TEST: CPT | Performed by: OBSTETRICS & GYNECOLOGY

## 2019-11-27 NOTE — PROGRESS NOTES
She is tired and uncomfortable  She reports good FM  Baby moving during exam so likely source of tachycardia but will confirm with NST  Labor precautions reviewed  Encouraged her to get ready at home

## 2019-12-02 ENCOUNTER — ROUTINE PRENATAL (OUTPATIENT)
Dept: OBGYN CLINIC | Facility: CLINIC | Age: 34
End: 2019-12-02

## 2019-12-02 VITALS — SYSTOLIC BLOOD PRESSURE: 120 MMHG | WEIGHT: 146 LBS | DIASTOLIC BLOOD PRESSURE: 84 MMHG | BODY MASS INDEX: 25.86 KG/M2

## 2019-12-02 DIAGNOSIS — Z34.83 ENCOUNTER FOR SUPERVISION OF OTHER NORMAL PREGNANCY IN THIRD TRIMESTER: Primary | ICD-10-CM

## 2019-12-02 DIAGNOSIS — Z86.69 HISTORY OF CHIARI MALFORMATION: ICD-10-CM

## 2019-12-02 DIAGNOSIS — Z34.83 PRENATAL CARE, SUBSEQUENT PREGNANCY, THIRD TRIMESTER: ICD-10-CM

## 2019-12-02 DIAGNOSIS — Z3A.36 36 WEEKS GESTATION OF PREGNANCY: ICD-10-CM

## 2019-12-02 LAB
SL AMB  POCT GLUCOSE, UA: ABNORMAL
SL AMB POCT URINE PROTEIN: 1

## 2019-12-02 PROCEDURE — 87653 STREP B DNA AMP PROBE: CPT | Performed by: NURSE PRACTITIONER

## 2019-12-02 PROCEDURE — PNV: Performed by: NURSE PRACTITIONER

## 2019-12-02 NOTE — ASSESSMENT & PLAN NOTE
Feeling tired, but denies OB complaints  Good fetal movement  Denies contractions, cramping, leakage of fluid or vaginal bleeding  GBS collected today  Declined flu and Tdap vaccines  Reviewed labor precautions, reasons to call, and FKCs  RTO in 1 week

## 2019-12-02 NOTE — PROGRESS NOTES
Problem List Items Addressed This Visit        Other    History of Chiari malformation     Had anesthesia consult with Dr Elmer Lozano  Per Dr Klein, can having epidural during labor  Supervision of normal pregnancy in third trimester - Primary     Feeling tired, but denies OB complaints  Good fetal movement  Denies contractions, cramping, leakage of fluid or vaginal bleeding  GBS collected today  Declined flu and Tdap vaccines  Reviewed labor precautions, reasons to call, and FKCs  RTO in 1 week             36 weeks gestation of pregnancy    Relevant Orders    Strep B DNA probe, amplification      Other Visit Diagnoses     Prenatal care, subsequent pregnancy, third trimester        Relevant Orders    POCT urine dip (Completed)    Strep B DNA probe, amplification

## 2019-12-04 LAB — GP B STREP DNA SPEC QL NAA+PROBE: NORMAL

## 2019-12-05 ENCOUNTER — TELEPHONE (OUTPATIENT)
Dept: OBGYN CLINIC | Facility: CLINIC | Age: 34
End: 2019-12-05

## 2019-12-05 NOTE — TELEPHONE ENCOUNTER
"Normal" result card mailed today to Pt's mailing address in EHR regarding Pt's GBS test result reviewed by Abigail Wren

## 2019-12-05 NOTE — TELEPHONE ENCOUNTER
----- Message from [address] [place] [address] sent at 12/5/2019 10:32 AM EST -----  Please notify patient GBS is negative  Thank you!

## 2019-12-06 ENCOUNTER — TELEPHONE (OUTPATIENT)
Dept: OBGYN CLINIC | Facility: CLINIC | Age: 34
End: 2019-12-06

## 2019-12-06 ENCOUNTER — ANESTHESIA (INPATIENT)
Dept: ANESTHESIOLOGY | Facility: HOSPITAL | Age: 34
End: 2019-12-06
Payer: COMMERCIAL

## 2019-12-06 ENCOUNTER — HOSPITAL ENCOUNTER (INPATIENT)
Facility: HOSPITAL | Age: 34
LOS: 2 days | Discharge: HOME | End: 2019-12-08
Attending: OBSTETRICS & GYNECOLOGY | Admitting: OBSTETRICS & GYNECOLOGY
Payer: COMMERCIAL

## 2019-12-06 ENCOUNTER — ANESTHESIA EVENT (INPATIENT)
Dept: ANESTHESIOLOGY | Facility: HOSPITAL | Age: 34
End: 2019-12-06
Payer: COMMERCIAL

## 2019-12-06 DIAGNOSIS — Z3A.37 37 WEEKS GESTATION OF PREGNANCY: ICD-10-CM

## 2019-12-06 LAB
ABO GROUP BLD: NORMAL
BASE EXCESS BLDCOA CALC-SCNC: -3.1 MMOL/L (ref 3–11)
BASE EXCESS BLDCOV CALC-SCNC: -4 MMOL/L (ref 1–9)
BASOPHILS # BLD AUTO: 0.03 THOUSANDS/ΜL (ref 0–0.1)
BASOPHILS NFR BLD AUTO: 0 % (ref 0–1)
BLD GP AB SCN SERPL QL: NEGATIVE
EOSINOPHIL # BLD AUTO: 0.12 THOUSAND/ΜL (ref 0–0.61)
EOSINOPHIL NFR BLD AUTO: 2 % (ref 0–6)
ERYTHROCYTE [DISTWIDTH] IN BLOOD BY AUTOMATED COUNT: 13.4 % (ref 11.6–15.1)
HCO3 BLDCOA-SCNC: 24.2 MMOL/L (ref 17.3–27.3)
HCO3 BLDCOV-SCNC: 21.2 MMOL/L (ref 12.2–28.6)
HCT VFR BLD AUTO: 38.9 % (ref 34.8–46.1)
HGB BLD-MCNC: 12.6 G/DL (ref 11.5–15.4)
IMM GRANULOCYTES # BLD AUTO: 0.04 THOUSAND/UL (ref 0–0.2)
IMM GRANULOCYTES NFR BLD AUTO: 1 % (ref 0–2)
LYMPHOCYTES # BLD AUTO: 1.58 THOUSANDS/ΜL (ref 0.6–4.47)
LYMPHOCYTES NFR BLD AUTO: 21 % (ref 14–44)
MCH RBC QN AUTO: 29 PG (ref 26.8–34.3)
MCHC RBC AUTO-ENTMCNC: 32.4 G/DL (ref 31.4–37.4)
MCV RBC AUTO: 90 FL (ref 82–98)
MONOCYTES # BLD AUTO: 0.56 THOUSAND/ΜL (ref 0.17–1.22)
MONOCYTES NFR BLD AUTO: 7 % (ref 4–12)
NEUTROPHILS # BLD AUTO: 5.35 THOUSANDS/ΜL (ref 1.85–7.62)
NEUTS SEG NFR BLD AUTO: 69 % (ref 43–75)
NRBC BLD AUTO-RTO: 0 /100 WBCS
O2 CT VFR BLDCOA CALC: 8.4 ML/DL
OXYHGB MFR BLDCOA: 40.9 %
OXYHGB MFR BLDCOV: 58.8 %
PCO2 BLDCOA: 52 MM[HG] (ref 30–60)
PCO2 BLDCOV: 39.2 MM HG (ref 27–43)
PH BLDCOA: 7.29 [PH] (ref 7.23–7.43)
PH BLDCOV: 7.35 [PH] (ref 7.19–7.49)
PLATELET # BLD AUTO: 172 THOUSANDS/UL (ref 149–390)
PMV BLD AUTO: 11.5 FL (ref 8.9–12.7)
PO2 BLDCOA: 19.2 MM HG (ref 5–25)
PO2 BLDCOV: 25.5 MM HG (ref 15–45)
RBC # BLD AUTO: 4.34 MILLION/UL (ref 3.81–5.12)
RH BLD: POSITIVE
SAO2 % BLDCOV: 12.8 ML/DL
SPECIMEN EXPIRATION DATE: NORMAL
WBC # BLD AUTO: 7.68 THOUSAND/UL (ref 4.31–10.16)

## 2019-12-06 PROCEDURE — 85025 COMPLETE CBC W/AUTO DIFF WBC: CPT | Performed by: OBSTETRICS & GYNECOLOGY

## 2019-12-06 PROCEDURE — 59400 OBSTETRICAL CARE: CPT | Performed by: OBSTETRICS & GYNECOLOGY

## 2019-12-06 PROCEDURE — 86900 BLOOD TYPING SEROLOGIC ABO: CPT | Performed by: OBSTETRICS & GYNECOLOGY

## 2019-12-06 PROCEDURE — 82805 BLOOD GASES W/O2 SATURATION: CPT | Performed by: OBSTETRICS & GYNECOLOGY

## 2019-12-06 PROCEDURE — 86592 SYPHILIS TEST NON-TREP QUAL: CPT | Performed by: OBSTETRICS & GYNECOLOGY

## 2019-12-06 PROCEDURE — 4A1HXCZ MONITORING OF PRODUCTS OF CONCEPTION, CARDIAC RATE, EXTERNAL APPROACH: ICD-10-PCS | Performed by: OBSTETRICS & GYNECOLOGY

## 2019-12-06 PROCEDURE — 99024 POSTOP FOLLOW-UP VISIT: CPT | Performed by: OBSTETRICS & GYNECOLOGY

## 2019-12-06 PROCEDURE — 0KQM0ZZ REPAIR PERINEUM MUSCLE, OPEN APPROACH: ICD-10-PCS | Performed by: OBSTETRICS & GYNECOLOGY

## 2019-12-06 PROCEDURE — 99213 OFFICE O/P EST LOW 20 MIN: CPT

## 2019-12-06 PROCEDURE — G0463 HOSPITAL OUTPT CLINIC VISIT: HCPCS

## 2019-12-06 PROCEDURE — 86901 BLOOD TYPING SEROLOGIC RH(D): CPT | Performed by: OBSTETRICS & GYNECOLOGY

## 2019-12-06 PROCEDURE — 86850 RBC ANTIBODY SCREEN: CPT | Performed by: OBSTETRICS & GYNECOLOGY

## 2019-12-06 RX ORDER — CARBOPROST TROMETHAMINE 250 UG/ML
INJECTION, SOLUTION INTRAMUSCULAR
Status: COMPLETED
Start: 2019-12-06 | End: 2019-12-06

## 2019-12-06 RX ORDER — DIAPER,BRIEF,INFANT-TODD,DISP
1 EACH MISCELLANEOUS AS NEEDED
Status: DISCONTINUED | OUTPATIENT
Start: 2019-12-06 | End: 2019-12-08 | Stop reason: HOSPADM

## 2019-12-06 RX ORDER — SODIUM CHLORIDE, SODIUM LACTATE, POTASSIUM CHLORIDE, CALCIUM CHLORIDE 600; 310; 30; 20 MG/100ML; MG/100ML; MG/100ML; MG/100ML
125 INJECTION, SOLUTION INTRAVENOUS CONTINUOUS
Status: DISCONTINUED | OUTPATIENT
Start: 2019-12-06 | End: 2019-12-06

## 2019-12-06 RX ORDER — LIDOCAINE HYDROCHLORIDE 10 MG/ML
INJECTION, SOLUTION INFILTRATION; PERINEURAL AS NEEDED
Status: DISCONTINUED | OUTPATIENT
Start: 2019-12-06 | End: 2019-12-06 | Stop reason: SURG

## 2019-12-06 RX ORDER — OXYTOCIN/RINGER'S LACTATE 30/500 ML
PLASTIC BAG, INJECTION (ML) INTRAVENOUS
Status: COMPLETED
Start: 2019-12-06 | End: 2019-12-07

## 2019-12-06 RX ORDER — CLINDAMYCIN PHOSPHATE 900 MG/50ML
900 INJECTION INTRAVENOUS ONCE
Status: COMPLETED | OUTPATIENT
Start: 2019-12-06 | End: 2019-12-07

## 2019-12-06 RX ORDER — LIDOCAINE HYDROCHLORIDE AND EPINEPHRINE 15; 5 MG/ML; UG/ML
INJECTION, SOLUTION EPIDURAL AS NEEDED
Status: DISCONTINUED | OUTPATIENT
Start: 2019-12-06 | End: 2019-12-06 | Stop reason: SURG

## 2019-12-06 RX ORDER — ROPIVACAINE HYDROCHLORIDE 2 MG/ML
INJECTION, SOLUTION EPIDURAL; INFILTRATION; PERINEURAL AS NEEDED
Status: DISCONTINUED | OUTPATIENT
Start: 2019-12-06 | End: 2019-12-06 | Stop reason: SURG

## 2019-12-06 RX ADMIN — CARBOPROST TROMETHAMINE 62.5 MCG: 250 INJECTION, SOLUTION INTRAMUSCULAR at 21:47

## 2019-12-06 RX ADMIN — ROPIVACAINE HYDROCHLORIDE 5 ML: 2 INJECTION, SOLUTION EPIDURAL; INFILTRATION at 18:51

## 2019-12-06 RX ADMIN — ROPIVACAINE HYDROCHLORIDE 5 ML: 2 INJECTION, SOLUTION EPIDURAL; INFILTRATION at 18:56

## 2019-12-06 RX ADMIN — ROPIVACAINE HYDROCHLORIDE: 2 INJECTION, SOLUTION EPIDURAL; INFILTRATION at 18:58

## 2019-12-06 RX ADMIN — LIDOCAINE HYDROCHLORIDE 3 ML: 10 INJECTION, SOLUTION INFILTRATION; PERINEURAL at 18:44

## 2019-12-06 RX ADMIN — SODIUM CHLORIDE, SODIUM LACTATE, POTASSIUM CHLORIDE, AND CALCIUM CHLORIDE 125 ML/HR: .6; .31; .03; .02 INJECTION, SOLUTION INTRAVENOUS at 18:51

## 2019-12-06 RX ADMIN — LIDOCAINE HYDROCHLORIDE AND EPINEPHRINE 3 ML: 15; 5 INJECTION, SOLUTION EPIDURAL at 18:48

## 2019-12-06 RX ADMIN — SODIUM CHLORIDE, SODIUM LACTATE, POTASSIUM CHLORIDE, AND CALCIUM CHLORIDE 999 ML/HR: .6; .31; .03; .02 INJECTION, SOLUTION INTRAVENOUS at 18:18

## 2019-12-06 RX ADMIN — Medication 250 UNITS: at 21:21

## 2019-12-06 NOTE — TELEPHONE ENCOUNTER
Pt called stating, "I started feeling cxtns last night, but they are not consistent "  I was able to take a nap through the night "  pts EDC is 19;   Pt denies LOF or vag blding states has +FM  Advised pt to monitor for now & to keep a log of when cxtns start & how long they last   Pt to call back when cxtns are regular & 5-7 min apart

## 2019-12-06 NOTE — ANESTHESIA PROCEDURE NOTES
Epidural Block    Patient location during procedure: OB  Start time: 12/6/2019 6:48 PM  Reason for block: at surgeon's request and primary anesthetic  Staffing  Anesthesiologist: Zuly Bettencourt MD  Performed: anesthesiologist   Preanesthetic Checklist  Completed: patient identified, surgical consent, pre-op evaluation, timeout performed, IV checked, risks and benefits discussed and monitors and equipment checked  Epidural  Patient position: sitting  Prep: Betadine  Patient monitoring: frequent blood pressure checks and continuous pulse ox  Approach: midline  Location: lumbar (1-5)  Injection technique: JODI air  Needle  Needle type: Tuohy   Needle gauge: 18 G  Catheter type: side hole  Catheter size: 20 G  Catheter at skin depth: 8 cm  Test dose: negativenegative aspiration for CSF, negative aspiration for heme and no paresthesia on injection  patient tolerated the procedure well with no immediate complications  Additional Notes  Sterile prep and drape in accordance with protocol

## 2019-12-06 NOTE — H&P
H&P Exam - Obstetrics   Ary Yates 29 y o  female MRN: [id number]  Unit/Bed#: -01 Encounter: [id number]      History of Present Illness     Chief Complaint: SROM, contractions    HPI:  Santosh Tristan is a 29 y o   female with an MARCOS of 2019, by Last Menstrual Period at 37w1d weeks gestation who is being admitted for active labor  SROM at 1600, clear fluid    Contractions: yes  Loss of fluid: yes  Vaginal bleeding: no  Fetal movement: yes    She is SLOGA patient  PREGNANCY COMPLICATIONS:   1) Arnold chiari malformation  2) Long interval pregnancy: last 9 years ago      OB History    Para Term  AB Living   3 2 2     2   SAB TAB Ectopic Multiple Live Births           2      # Outcome Date GA Lbr Angel/2nd Weight Sex Delivery Anes PTL Lv   3 Current            2 Term 04/07/10 39w0d  2977 g (6 lb 9 oz) M Vag-Spont EPI  ELIZA   1 Term 07 39w0d  2977 g (6 lb 9 oz) F Vag-Spont EPI  ELIZA       Baby complications/comments: None    Review of Systems   Constitutional: Negative for diaphoresis and fever  HENT: Negative for hearing loss, nosebleeds, tinnitus and trouble swallowing  Eyes: Negative for photophobia and visual disturbance  Respiratory: Negative for apnea, shortness of breath and wheezing  Cardiovascular: Negative for chest pain and palpitations  Gastrointestinal: Negative for blood in stool and vomiting  Endocrine: Negative for polydipsia and polyphagia  Genitourinary: Negative for dysuria and hematuria  Musculoskeletal: Negative for arthralgias, gait problem and myalgias  Skin: Negative for color change and pallor  Neurological: Negative for dizziness, seizures, facial asymmetry, speech difficulty, numbness and headaches  Psychiatric/Behavioral: Negative for agitation, behavioral problems and confusion         Historical Information   Past Medical History:   Diagnosis Date    [facility]     UTI (urinary tract infection) treated with antibiotics recently will have ua repeated     Varicella     childhood     Past Surgical History:   Procedure Laterality Date    IN SUBOCCIPT DECOMP MEDULLA/SP CRD N/A 12/14/2016    Procedure: REPEAT SUBOCCIPITAL CRANIECTOMY; C1 LAMINECTOMY AND DURAPLASTY; PLACEMENT OF 4TH VENTRICULAR-SUBARACHNOID SHUNT; IMPULSE MONITORING;  Surgeon: Aurora Dawkins MD;  [facility];  Service: Neurosurgeryx3    IN TOTAL [address] ARTHROPLASTY, CERVICAL, SINGLE N/A 1/25/2019    Procedure: Anterior cervical discectomy and total disc arthroplasty C5-6;  Surgeon: Aurora Dawkins MD;  Location: [facility];  Service: Neurosurgery     Social History   Social History     Substance and Sexual Activity   Alcohol Use Not Currently     Social History     Substance and Sexual Activity   Drug Use No     Social History     Tobacco Use   Smoking Status Never Smoker   Smokeless Tobacco Never Used     Family History: non-contributory    Meds/Allergies      Medications Prior to Admission   Medication    Prenatal MV & Min w/FA-DHA (PRENATAL ADULT GUMMY/DHA/FA) 0 4-25 MG CHEW        Allergies   Allergen Reactions    Cephalexin      Annotation - [id number]: gives hives    Sunscreens Hives       OBJECTIVE:    Vitals: Blood pressure 164/79, pulse 77, temperature 98 3 °F (36 8 °C), temperature source Oral, resp  rate 18, height 5' 3" (1 6 m), last menstrual period 03/21/2019, SpO2 97 %, currently breastfeeding  Body mass index is 25 86 kg/m²  Physical Exam   Constitutional: She appears well-nourished  HENT:   Head: Normocephalic and atraumatic  Cardiovascular: Normal rate, regular rhythm and normal heart sounds  Pulmonary/Chest: Breath sounds normal  No respiratory distress  She has no wheezes  Abdominal: Bowel sounds are normal  There is no tenderness         Ferning: Deferred, grossly ruptured  Nitrazine: Deferred, grossly ruptured    Cervix:  Dilation: 6  Effacement (%): 80  Station: -1    Fetal heart rate:   Baseline Rate: 130 bpm  Variability: Moderate 6-25 bpm  Accelerations: 15 x 15 or greater, At variable times  Decelerations: None  FHR Category: Category I    [place]:   Contraction Frequency (minutes): 2-3  Contraction Duration (seconds):   Contraction Quality: Strong    EFW: 7 5lbs    GBS: Negative    Prenatal Labs:   Blood Type:   Lab Results   Component Value Date/Time    ABO Grouping O 06/15/2019 09:23 AM     , D (Rh type):   Lab Results   Component Value Date/Time    Rh Factor Positive 06/15/2019 09:23 AM    HCT/HGB:   Lab Results   Component Value Date/Time    Hematocrit 38 9 12/06/2019 06:20 PM    Hemoglobin 12 6 12/06/2019 06:20 PM      , MCV:   Lab Results   Component Value Date/Time    MCV 90 12/06/2019 06:20 PM      , Platelets:   Lab Results   Component Value Date/Time    Platelets 099 33/02/5183 06:20 PM      , 1 hour Glucola:   Lab Results   Component Value Date/Time    Glucose 135 (H) 10/12/2019 11:54 AM    3 hour GTT:   Lab Results   Component Value Date/Time    Glucose, GTT - 3 Hour 118 10/18/2019 01:22 PM    Rubella:   Lab Results   Component Value Date/Time    Rubella IgG Quant 60 3 06/15/2019 09:23 AM        , VDRL/RPR:   Lab Results   Component Value Date/Time    RPR Non-Reactive 10/12/2019 11:54 AM     Urine Culture/Screen:   Lab Results   Component Value Date/Time    Urine Culture 10,000-19,000 cfu/ml Lactobacillus species (A) 06/15/2019 09:23 AM       , Urine Drug Screen:  Hep B:   Lab Results   Component Value Date/Time    Hepatitis B Surface Ag Non-reactive 06/15/2019 09:23 AM     , HIV:   Lab Results   Component Value Date/Time    HIV-1/HIV-2 Ab Non-Reactive 06/15/2019 09:23 AM     Gonorrhea:   Lab Results   Component Value Date/Time    N gonorrhoeae, DNA Probe Negative 05/13/2019 09:03 AM    N gonorrhoeae, DNA Probe N  gonorrhoeae Amplified DNA Negative 10/15/2018 05:23 PM     Group B Strep:    Lab Results   Component Value Date/Time    Strep Grp B PCR Negative for Beta Hemolytic Strep Grp B by PCR 2019 10:00 AM          Assessment/Plan     ASSESSMENT:  35yo  at 37w1d weeks gestation who is being admitted for active labor  PLAN:   - Admit   - CBC, RPR, Blood Type   - Start with expectant management   - GBS negative status: no PCN for prophylaxis    - Analgesia and/or epidural at patient request   - Anticipate    - Discussed with Dr Milton Kellogg      This patient will be an INPATIENT  and I certify the anticipated length of stay is >2 Midnights      Alen Mensah MD  2019  6:58 PM

## 2019-12-06 NOTE — ANESTHESIA PREPROCEDURE EVALUATION
Review of Systems/Medical History  Patient summary reviewed  Chart reviewed  No history of anesthetic complications     Cardiovascular  Negative cardio ROS Exercise tolerance (METS): >4,     Pulmonary  Negative pulmonary ROS        GI/Hepatic  Negative GI/hepatic ROS          Negative  ROS        Endo/Other  Negative endo/other ROS      GYN  Currently pregnant , Prior pregnancy/OB history : 3 ,          Hematology  Negative hematology ROS      Musculoskeletal  Back pain ,        Neurology      Comment: History of corrected arnold chiarri malformation seen by anesthesia preop Dr Morris Ruff  Note reviewed Psychology   Negative psychology ROS            Physical Exam    Airway    Mallampati score: II  TM Distance: >3 FB  Neck ROM: full     Dental   No notable dental hx     Cardiovascular  Comment: Negative ROS, Cardiovascular exam normal    Pulmonary  Pulmonary exam normal     Other Findings      Lab Results   Component Value Date    WBC 7 60 10/12/2019    HGB 12 0 10/12/2019     10/12/2019     Lab Results   Component Value Date    SODIUM 135 (L) 2019    K 3 4 (L) 2019    BUN 11 2019    CREATININE 0 58 (L) 2019    EGFR 121 2019     Type and Screen:  Lab Results   Component Value Date    ABO O 06/15/2019    RH Positive 06/15/2019    ABS Negative 06/15/2019    SPECIMEXP [id number] 06/15/2019     Anesthesia Plan  ASA Score- 2     Anesthesia Type- epidural with ASA Monitors  Additional Monitors:   Airway Plan:     Comment: Patient requesting epidural for labor  Patient seen and examined  Risks/benefits of Epidural anesthesia discussed including risk of PDPH, partial or failed block, and rare emergencies including infection, nerve injury and total spinal anesthesia  Anesthetic plan for potential c/s reviewed with patient        Plan Factors-    Induction-     Postoperative Plan-     Informed Consent- Anesthetic plan and risks discussed with patient

## 2019-12-07 LAB
ALBUMIN SERPL BCP-MCNC: 2.5 G/DL (ref 3.5–5)
ALP SERPL-CCNC: 194 U/L (ref 46–116)
ALT SERPL W P-5'-P-CCNC: 15 U/L (ref 12–78)
ANION GAP SERPL CALCULATED.3IONS-SCNC: 8 MMOL/L (ref 4–13)
AST SERPL W P-5'-P-CCNC: 15 U/L (ref 5–45)
BILIRUB SERPL-MCNC: 0.32 MG/DL (ref 0.2–1)
BUN SERPL-MCNC: 8 MG/DL (ref 5–25)
CALCIUM SERPL-MCNC: 8.6 MG/DL (ref 8.3–10.1)
CHLORIDE SERPL-SCNC: 108 MMOL/L (ref 100–108)
CO2 SERPL-SCNC: 23 MMOL/L (ref 21–32)
CREAT SERPL-MCNC: 0.49 MG/DL (ref 0.6–1.3)
ERYTHROCYTE [DISTWIDTH] IN BLOOD BY AUTOMATED COUNT: 13.5 % (ref 11.6–15.1)
GFR SERPL CREATININE-BSD FRML MDRD: 128 ML/MIN/1.73SQ M
GLUCOSE SERPL-MCNC: 123 MG/DL (ref 65–140)
HCT VFR BLD AUTO: 32.6 % (ref 34.8–46.1)
HGB BLD-MCNC: 10.8 G/DL (ref 11.5–15.4)
MCH RBC QN AUTO: 29.4 PG (ref 26.8–34.3)
MCHC RBC AUTO-ENTMCNC: 33.1 G/DL (ref 31.4–37.4)
MCV RBC AUTO: 89 FL (ref 82–98)
PLATELET # BLD AUTO: 153 THOUSANDS/UL (ref 149–390)
PMV BLD AUTO: 11.4 FL (ref 8.9–12.7)
POTASSIUM SERPL-SCNC: 3.6 MMOL/L (ref 3.5–5.3)
PROT SERPL-MCNC: 5.7 G/DL (ref 6.4–8.2)
RBC # BLD AUTO: 3.67 MILLION/UL (ref 3.81–5.12)
SODIUM SERPL-SCNC: 139 MMOL/L (ref 136–145)
WBC # BLD AUTO: 11.63 THOUSAND/UL (ref 4.31–10.16)

## 2019-12-07 PROCEDURE — 80053 COMPREHEN METABOLIC PANEL: CPT | Performed by: OBSTETRICS & GYNECOLOGY

## 2019-12-07 PROCEDURE — 88307 TISSUE EXAM BY PATHOLOGIST: CPT | Performed by: PATHOLOGY

## 2019-12-07 PROCEDURE — 85027 COMPLETE CBC AUTOMATED: CPT | Performed by: OBSTETRICS & GYNECOLOGY

## 2019-12-07 PROCEDURE — 99024 POSTOP FOLLOW-UP VISIT: CPT | Performed by: OBSTETRICS & GYNECOLOGY

## 2019-12-07 RX ORDER — ACETAMINOPHEN 325 MG/1
650 TABLET ORAL EVERY 4 HOURS PRN
Status: DISCONTINUED | OUTPATIENT
Start: 2019-12-07 | End: 2019-12-08 | Stop reason: HOSPADM

## 2019-12-07 RX ORDER — SIMETHICONE 80 MG
80 TABLET,CHEWABLE ORAL 4 TIMES DAILY PRN
Status: DISCONTINUED | OUTPATIENT
Start: 2019-12-07 | End: 2019-12-08 | Stop reason: HOSPADM

## 2019-12-07 RX ORDER — OXYCODONE HYDROCHLORIDE AND ACETAMINOPHEN 5; 325 MG/1; MG/1
2 TABLET ORAL EVERY 4 HOURS PRN
Status: DISCONTINUED | OUTPATIENT
Start: 2019-12-07 | End: 2019-12-08 | Stop reason: HOSPADM

## 2019-12-07 RX ORDER — DOCUSATE SODIUM 100 MG/1
100 CAPSULE, LIQUID FILLED ORAL 2 TIMES DAILY
Status: DISCONTINUED | OUTPATIENT
Start: 2019-12-07 | End: 2019-12-08 | Stop reason: HOSPADM

## 2019-12-07 RX ORDER — MAGNESIUM HYDROXIDE/ALUMINUM HYDROXICE/SIMETHICONE 120; 1200; 1200 MG/30ML; MG/30ML; MG/30ML
15 SUSPENSION ORAL EVERY 6 HOURS PRN
Status: DISCONTINUED | OUTPATIENT
Start: 2019-12-07 | End: 2019-12-08 | Stop reason: HOSPADM

## 2019-12-07 RX ORDER — CALCIUM CARBONATE 200(500)MG
1000 TABLET,CHEWABLE ORAL DAILY PRN
Status: DISCONTINUED | OUTPATIENT
Start: 2019-12-07 | End: 2019-12-08 | Stop reason: HOSPADM

## 2019-12-07 RX ORDER — IBUPROFEN 600 MG/1
600 TABLET ORAL EVERY 6 HOURS PRN
Status: DISCONTINUED | OUTPATIENT
Start: 2019-12-07 | End: 2019-12-08 | Stop reason: HOSPADM

## 2019-12-07 RX ORDER — SENNOSIDES 8.6 MG
1 TABLET ORAL DAILY
Status: DISCONTINUED | OUTPATIENT
Start: 2019-12-07 | End: 2019-12-08 | Stop reason: HOSPADM

## 2019-12-07 RX ORDER — OXYCODONE HYDROCHLORIDE AND ACETAMINOPHEN 5; 325 MG/1; MG/1
1 TABLET ORAL EVERY 4 HOURS PRN
Status: DISCONTINUED | OUTPATIENT
Start: 2019-12-07 | End: 2019-12-08 | Stop reason: HOSPADM

## 2019-12-07 RX ADMIN — GENTAMICIN SULFATE 260 MG: 40 INJECTION, SOLUTION INTRAMUSCULAR; INTRAVENOUS at 02:34

## 2019-12-07 RX ADMIN — CLINDAMYCIN PHOSPHATE 900 MG: 900 INJECTION, SOLUTION INTRAVENOUS at 01:59

## 2019-12-07 RX ADMIN — IBUPROFEN 600 MG: 600 TABLET ORAL at 10:08

## 2019-12-07 RX ADMIN — BENZOCAINE AND LEVOMENTHOL: 200; 5 SPRAY TOPICAL at 01:57

## 2019-12-07 NOTE — PLAN OF CARE
Problem: Knowledge Deficit  Goal: Verbalizes understanding of labor plan  Description  Assess patient/family/caregiver's baseline knowledge level and ability to understand information  Provide education via patient/family/caregiver's preferred learning method at appropriate level of understanding  1  Provide teaching at level of understanding  2  Provide teaching via preferred learning method(s)  12/6/2019 2331 by Lev Sin RN  Outcome: Completed  12/6/2019 2330 by Lev Sin RN  Outcome: Progressing     Problem: Labor & Delivery  Goal: Manages discomfort  Description  Assess and monitor for signs and symptoms of discomfort  Assess patient's pain level regularly and per hospital policy  Administer medications as ordered  Support use of nonpharmacological methods to help control pain such as distraction, imagery, relaxation, and application of heat and cold  Collaborate with interdisciplinary team and patient to determine appropriate pain management plan  1  Include patient in decisions related to comfort  2  Offer non-pharmacological pain management interventions  3  Report ineffective pain management to physician  12/6/2019 2331 by Lev Sin RN  Outcome: Completed  12/6/2019 2330 by Lev Sin RN  Outcome: Progressing  Goal: Patient vital signs are stable  Description  1  Assess vital signs - vaginal delivery    12/6/2019 2331 by Lev Sin RN  Outcome: Completed  12/6/2019 2330 by Lev Sin RN  Outcome: Progressing

## 2019-12-07 NOTE — OB LABOR/OXYTOCIN SAFETY PROGRESS
Labor Progress Note - Ary Ward 29 y o  female MRN: [id number]    Unit/Bed#: -01 Encounter: [id number]       Contraction Frequency (minutes): 3  Contraction Quality: Strong  Tachysystole: No   Dilation: 8        Effacement (%): 100  Station: -1  Baseline Rate: 120 bpm  Fetal Heart Rate: 109 BPM  FHR Category: Category I             Notes/comments:   SVE as above  Patient comfortable with epidural  Continue expectant management    Chrissie Hui MD 12/6/2019 7:23 PM

## 2019-12-07 NOTE — L&D DELIVERY NOTE
Vaginal Delivery Note - OB/GYN   Ary Damon 29 y o  female MRN: [id number]  Unit/Bed#: -01 Encounter: [id number]          Predelivery Diagnosis:  1  Pregnancy at 37w2d  2  Active labor  3  Arnold chiari malformation  4  Long interval pregnancy     Postdelivery Diagnosis:  1  Same as above  2  Delivery of term   3  Possible abruption    Procedure: Spontaneous Vaginal Delivery     Attending: Preston Barton    Assistant: Summer Matt    Anesthesia: Epidural    QBL: 357cc  Admission H 6  Admission platelets: 020R    Complications: none apparent    Specimens: cord blood, arterial and venous cord blood gasses, placenta to pathology    Findings:   1  Viable male at , with APGARS of 9 and 9 at 1 and 5 minutes respectively,  2  Spontaneous delivery of intact placenta at   3  2 degree laceration repaired with 3-0 vicryl rapide  4  Blood gases:   Arterial pH: 7 285   Arterial base excess: -3 1   Venous pH: 7 351   Venous base excess: -4 0    Disposition:  Patient tolerated the procedure well and was recovering in labor and delivery room     Brief history and labor course:  Ms Renee Fernandez is a 29 y o   at 37wk2d  She presented to labor and delivery for active labor and SROM  Her pregnancy was complicated by Garay Hasting chiari malformation and long interval pregnancy  On exam in triage she was noted to be 6/80/-1  She was admitted for active labor  Description of procedure    After pushing for 1 minutes, at  patient delivered a viable male , wt pending, apgars of 9 (1 min) and 9 (5 min)  The fetal vertex delivered spontaneously  Baby was checked for nuchal  No nuchal cord was palpated  The anterior shoulder delivered atraumatically with maternal expulsive forces and the assistance of gentle downward traction  The posterior shoulder delivered with maternal expulsive forces and the assistance of upward traction   The remainder of the fetus delivered spontaneously along with a large clot     Upon delivery, the infant was placed on the mothers abdomen and the cord was clamped and cut  Delayed cord clamping was performed  The infant was noted to cry spontaneously and was moving all extremities appropriately  There was no evidence for injury  Awaiting nurse resuscitators evaluated the   Arterial and venous cord blood gases and cord blood was collected for analysis  These were promptly sent to the lab  In the immediate post-partum, 30 units of IV pitocin was administered, and the uterus was noted to contract down well with massage and pitocin  The placenta delivered spontaneously at 2121 and was noted to have a eccentrically inserted 3 vessel cord  The vagina, cervix, perineum, and rectum were inspected and there was noted to be a 2nd degree laceration that was repaired in the standard fashion with 3-0 vicryl rapide  Manual extraction was conducted due to steady bleeding noted coming from the uterus and membranes were removed  Clindamycin and gentamicin one dose were given for prophylaxis  Good hemostasis was achieved after the exam     At the conclusion of the procedure, all needle, sponge, and instrument counts were noted to be correct  Patient tolerated the procedure well and was allowed to recover in labor and delivery room with family and  before being transferred to the post-partum floor  The attending was present and participated in all key portions of the case          Tayler Graves MD  2019  9:49 PM

## 2019-12-07 NOTE — PROGRESS NOTES
Progress Note - OB/GYN   Ary Graves 29 y o  female MRN: [id number]  Unit/Bed#:  315-01 Encounter: [id number]    Assessment:  Post partum Day #1 s/p , stable, baby in room    Plan:  1) Gestational HTN    -CBC and CMP WNL   - P/C ratio would need to be collected off straight cath; consider if patient has severely elevated blood pressures   - BP: 130-140/60-80 since delivery   - Asymptomatic   2) Manual exploration of the uterus   - Allergic to cephalexin (hives)   - Clinda/gent x1 dose given for prophylaxis  3) Continue routine post partum care   Encourage ambulation   Encourage breastfeeding   Anticipate discharge 19     Subjective/Objective   Chief Complaint:     Post delivery  Patient is doing well  Lochia WNL  Pain well controlled  Subjective:     Pain: yes, cramping, improved with meds  Tolerating PO: yes  Voiding: yes  Flatus: yes  BM: no  Ambulating: yes  Breastfeeding:  yes  Chest pain: no  Shortness of breath: no  Leg pain: no  Lochia: minimal    Objective:     Vitals: /79   Pulse 81   Temp 98 1 °F (36 7 °C) (Oral)   Resp 20   Ht 5' 3" (1 6 m)   Wt 66 2 kg (146 lb)   LMP 2019   SpO2 98%   Breastfeeding? Yes   BMI 25 86 kg/m²       Intake/Output Summary (Last 24 hours) at 2019 0834  Last data filed at 2019 0501  Gross per 24 hour   Intake --   Output 1807 ml   Net -1807 ml       Lab Results   Component Value Date    WBC 7 68 2019    HGB 12 6 2019    HCT 38 9 2019    MCV 90 2019     2019       Physical Exam:     Gen: AAOx3, NAD  CV: RRR  Lungs: CTA b/l  Abd: Soft, non-tender, non-distended, no rebound or guarding  Uterine fundus firm and non-tender, -2 cm below the umbilicus     Ext: Non tender, Negative Ethan's sign bilaterally    Mandie Coleman MD  2019  8:34 AM

## 2019-12-07 NOTE — DISCHARGE SUMMARY
Discharge Summary - OB/GYN   Ary Andujar 29 y o  female MRN: [id number]  Unit/Bed#: -01 Encounter: [id number]      Admission Date: 2019     Discharge Date: 19    Admitting Diagnosis:   1  Pregnancy at 37w1d  2  Active labor  3  Arnold chiari malformation  4  Long interval pregnancy     Discharge Diagnosis:   Same, delivered  Possible abruption    Procedures: spontaneous vaginal delivery    Delivery Attending: Reina Forrester MD  Discharge Attending: [facility] Course:   Ms Nishi Salazar is a 29 y o  Jeff Cecilia at 37wk2d  She presented to labor and delivery for active labor and SROM  Her pregnancy was complicated by Aliya Parisian chiari malformation and long interval pregnancy  On exam in triage she was noted to be 6/80/-1  She was admitted for active labor  She delivered a viable male  on 19 at 2119  Weight 5lbs 11 5oz via spontaneous vaginal delivery  Apgars were 9 (1 min) and 9 (5 min)   was transferred to  nursery  Patient tolerated the procedure well and was transferred to recovery in stable condition  Her postpartum course was uncomplicated    Her postpartum pain was well controlled with oral analgesics  On day of discharge, she was ambulating and able to reasonably perform all ADLs  She was voiding and had appropriate bowel function  Pain was well controlled  She was discharged home on post-partum day #2 without complications  Patient was instructed to follow up with her OB as an outpatient and was given appropriate warnings to call provider if she develops signs of infection or uncontrolled pain  Complications: none apparent    Condition at discharge: good     Discharge instructions/Information to patient and family:   - Do not place anything (no partner, tampons or douche) in your vagina for 6 weeks    -You may walk for exercise for the first 6 weeks then gradually return to your usual activities    -Please do not drive for 1 week if you have no stitches and for 2 weeks if you have stitches or underwent a  delivery     -You may take baths or shower per your preference    -Please look at your bust (breasts) in the mirror daily and call for redness or tenderness or increased warmth    -Please call us for temperature > 100 4*F or 38* C, worsening pain or a foul discharge  Discharge Medications:   Prenatal vitamin daily for 6 months or the duration of nursing whichever is longer    Motrin 600 mg orally every 6 hours as needed for pain  Tylenol (over the counter) per bottle directions as needed for pain: do NOT use with percocet  Hydrocortisone cream 1% (over the counter) applied 1-2x daily to hemorrhoids as needed    Planned Readmission: No      Bartolome Khoury MD, PGY-2  2019  8:25 AM

## 2019-12-07 NOTE — PLAN OF CARE
Problem: PAIN - ADULT  Goal: Verbalizes/displays adequate comfort level or baseline comfort level  Description  Interventions:  - Encourage patient to monitor pain and request assistance  - Assess pain using appropriate pain scale  - Administer analgesics based on type and severity of pain and evaluate response  - Implement non-pharmacological measures as appropriate and evaluate response  - Consider cultural and social influences on pain and pain management  - Notify physician/advanced practitioner if interventions unsuccessful or patient reports new pain  Outcome: Progressing     Problem: INFECTION - ADULT  Goal: Absence or prevention of progression during hospitalization  Description  INTERVENTIONS:  - Assess and monitor for signs and symptoms of infection  - Monitor lab/diagnostic results  - Monitor all insertion sites, i e  indwelling lines, tubes, and drains  - Monitor endotracheal if appropriate and nasal secretions for changes in amount and color  - Orange appropriate cooling/warming therapies per order  - Administer medications as ordered  - Instruct and encourage patient and family to use good hand hygiene technique  - Identify and instruct in appropriate isolation precautions for identified infection/condition  Outcome: Progressing  Goal: Absence of fever/infection during neutropenic period  Description  INTERVENTIONS:  - Monitor WBC    Outcome: Progressing     Problem: SAFETY ADULT  Goal: Patient will remain free of falls  Description  INTERVENTIONS:  - Assess patient frequently for physical needs  -  Identify cognitive and physical deficits and behaviors that affect risk of falls    -  [place]ange fall precautions as indicated by assessment   - Educate patient/family on patient safety including physical limitations  - Instruct patient to call for assistance with activity based on assessment  - Modify environment to reduce risk of injury  - Consider OT/PT consult to assist with strengthening/mobility  Outcome: Progressing  Goal: Maintain or return to baseline ADL function  Description  INTERVENTIONS:  -  Assess patient's ability to carry out ADLs; assess patient's baseline for ADL function and identify physical deficits which impact ability to perform ADLs (bathing, care of mouth/teeth, toileting, grooming, dressing, etc )  - Assess/evaluate cause of self-care deficits   - Assess range of motion  - Assess patient's mobility; develop plan if impaired  - Assess patient's need for assistive devices and provide as appropriate  - Encourage maximum independence but intervene and supervise when necessary  - Involve family in performance of ADLs  - Assess for home care needs following discharge   - Consider OT consult to assist with ADL evaluation and planning for discharge  - Provide patient education as appropriate  Outcome: Progressing  Goal: Maintain or return mobility status to optimal level  Description  INTERVENTIONS:  - Assess patient's baseline mobility status (ambulation, transfers, stairs, etc )    - Identify cognitive and physical deficits and behaviors that affect mobility  - Identify mobility aids required to assist with transfers and/or ambulation (gait belt, sit-to-stand, lift, walker, cane, etc )  - [place] fall precautions as indicated by assessment  - Record patient progress and toleration of activity level on Mobility SBAR; progress patient to next Phase/Stage  - Instruct patient to call for assistance with activity based on assessment  - Consider rehabilitation consult to assist with strengthening/weightbearing, etc   Outcome: Progressing     Problem: Knowledge Deficit  Goal: Patient/family/caregiver demonstrates understanding of disease process, treatment plan, medications, and discharge instructions  Description  Complete learning assessment and assess knowledge base    Interventions:  - Provide teaching at level of understanding  - Provide teaching via preferred learning methods  Outcome: Progressing     Problem: DISCHARGE PLANNING  Goal: Discharge to home or other facility with appropriate resources  Description  INTERVENTIONS:  - Identify barriers to discharge w/patient and caregiver  - Arrange for needed discharge resources and transportation as appropriate  - Identify discharge learning needs (meds, wound care, etc )  - Arrange for interpretive services to assist at discharge as needed  - Refer to Case Management Department for coordinating discharge planning if the patient needs post-hospital services based on physician/advanced practitioner order or complex needs related to functional status, cognitive ability, or social support system  Outcome: Progressing

## 2019-12-07 NOTE — LACTATION NOTE
This note was copied from a baby's chart  Mom states infant only does a few sucks  Discussed infant may not be latching correctly  Encouraged mom to call for latch assistance and possible need to start pumping if not latched  Reviewed expected  infant feeding patterns in the first few days and encouraged feeding on cue Given admission breastfeeding pkat and same reviewed

## 2019-12-07 NOTE — OB LABOR/OXYTOCIN SAFETY PROGRESS
Labor Progress Note - Ary Naik 29 y o  female MRN: [id number]    Unit/Bed#: -01 Encounter: [id number]       Contraction Frequency (minutes): 1 5-2  Contraction Quality: Strong  Tachysystole: No   Dilation: 10  Dilation Complete Date: 19  Dilation Complete Time:   Effacement (%): 100  Station: 2  Baseline Rate: 125 bpm  Fetal Heart Rate: 125 BPM  FHR Category: Category I             Notes/comments:    To patient's room due to complaint of increased pressure  SVE above  Will begin pushing  Anticipate     Evalina Snellen, MD 2019 9:11 PM

## 2019-12-07 NOTE — ANESTHESIA POSTPROCEDURE EVALUATION
Post-Op Assessment Note    CV Status:  Stable    Pain management: adequate     Mental Status:  Alert and awake   Hydration Status:  Euvolemic   PONV Controlled:  Controlled   Airway Patency:  Patent   Post Op Vitals Reviewed: Yes      Staff: Anesthesiologist   Comments: sitting up pleasant and conversant    Post-op block assessment: catheter intact and no complications

## 2019-12-08 VITALS
RESPIRATION RATE: 20 BRPM | SYSTOLIC BLOOD PRESSURE: 119 MMHG | HEIGHT: 63 IN | HEART RATE: 80 BPM | WEIGHT: 146 LBS | TEMPERATURE: 99.1 F | OXYGEN SATURATION: 98 % | DIASTOLIC BLOOD PRESSURE: 86 MMHG | BODY MASS INDEX: 25.87 KG/M2

## 2019-12-08 PROCEDURE — 99024 POSTOP FOLLOW-UP VISIT: CPT | Performed by: OBSTETRICS & GYNECOLOGY

## 2019-12-08 RX ORDER — DOCUSATE SODIUM 100 MG/1
100 CAPSULE, LIQUID FILLED ORAL 2 TIMES DAILY
Qty: 10 CAPSULE | Refills: 0
Start: 2019-12-08 | End: 2020-01-08 | Stop reason: ALTCHOICE

## 2019-12-08 RX ORDER — MAGNESIUM HYDROXIDE/ALUMINUM HYDROXICE/SIMETHICONE 120; 1200; 1200 MG/30ML; MG/30ML; MG/30ML
15 SUSPENSION ORAL EVERY 6 HOURS PRN
Qty: 355 ML | Refills: 0 | Status: CANCELLED
Start: 2019-12-08

## 2019-12-08 RX ORDER — IBUPROFEN 600 MG/1
600 TABLET ORAL EVERY 6 HOURS PRN
Qty: 30 TABLET | Refills: 0
Start: 2019-12-08 | End: 2020-01-08 | Stop reason: ALTCHOICE

## 2019-12-08 RX ORDER — ACETAMINOPHEN 325 MG/1
650 TABLET ORAL EVERY 4 HOURS PRN
Qty: 30 TABLET | Refills: 0
Start: 2019-12-08 | End: 2020-01-08 | Stop reason: ALTCHOICE

## 2019-12-08 RX ADMIN — DOCUSATE SODIUM 100 MG: 100 CAPSULE, LIQUID FILLED ORAL at 08:16

## 2019-12-08 RX ADMIN — IBUPROFEN 600 MG: 600 TABLET ORAL at 02:40

## 2019-12-08 NOTE — DISCHARGE SUMMARY
Discharge Summary - OB/GYN   Ary Zuñiga 29 y o  female MRN: [id number]  Unit/Bed#: -01 Encounter: [id number]      Admission Date: 2019     Discharge Date: 19    Admitting Diagnosis:   1  Pregnancy at 37w1d  2  Active labor  3  Arnold chiari malformation  4  Long interval pregnancy     Discharge Diagnosis:   Same, delivered  Possible abruption    Procedures: spontaneous vaginal delivery    Delivery Attending: Eh Willoughby MD  Discharge Attending: [facility] Course:   Ms Chas Quach is a 29 y o  Sonia Cisco at 37wk2d  She presented to labor and delivery for active labor and SROM  Her pregnancy was complicated by Mead Hey chiari malformation and long interval pregnancy  On exam in triage she was noted to be 6/80/-1  She was admitted for active labor  She delivered a viable male  on 19 at 2119  Weight 5lbs 11 5oz via spontaneous vaginal delivery  Apgars were 9 (1 min) and 9 (5 min)   was transferred to  nursery  Patient tolerated the procedure well and was transferred to recovery in stable condition  Her postpartum course was uncomplicated  Her postpartum pain was well controlled with oral analgesics  On day of discharge, she was ambulating and able to reasonably perform all ADLs  She was voiding and had appropriate bowel function  Pain was well controlled  She was discharged home on post-partum day #2 without complications  Patient was instructed to follow up with her OB as an outpatient and was given appropriate warnings to call provider if she develops signs of infection or uncontrolled pain  Complications: none apparent    Condition at discharge: good     Discharge instructions/Information to patient and family:   - Do not place anything (no partner, tampons or douche) in your vagina for 6 weeks    -You may walk for exercise for the first 6 weeks then gradually return to your usual activities    -Please do not drive for 1 week if you have no stitches and for 2 weeks if you have stitches or underwent a  delivery     -You may take baths or shower per your preference    -Please look at your bust (breasts) in the mirror daily and call for redness or tenderness or increased warmth    -Please call us for temperature > 100 4*F or 38* C, worsening pain or a foul discharge  Discharge Medications:   Prenatal vitamin daily for 6 months or the duration of nursing whichever is longer    Motrin 600 mg orally every 6 hours as needed for pain  Tylenol (over the counter) per bottle directions as needed for pain: do NOT use with percocet  Hydrocortisone cream 1% (over the counter) applied 1-2x daily to hemorrhoids as needed    Planned Readmission: No      Tayler Graves MD, PGY-2  2019  8:04 AM

## 2019-12-08 NOTE — DISCHARGE INSTRUCTIONS
Self Care After Delivery   AMBULATORY CARE:   The postpartum period  is the period of time from delivery to about 6 weeks  During this time you may experience many physical and emotional changes  It is important to understand what is normal and when you need to call your healthcare provider  It is also important to know how to care for yourself during this time  Call 911 for any of the following:   · You soak through 1 pad in 15 minutes, have blurry vision, clammy or pale skin, and feel faint  · You faint or lose consciousness  · Your heart is beating faster than normal      · You have trouble breathing  · You cough up blood  Seek care immediately if:   · You soak through 1 or more pads in an hour, or pass blood clots larger than a quarter from your vagina  · Your leg is painful, red, and larger than normal      · You have severe abdominal pain  · You have a bad headache or changes in your vision  · Your episiotomy or C section incision is red, swollen, bleeding, or draining pus  · Your incision comes apart  · You see or hear things that are not there, or have thoughts of harming yourself or your baby  Contact your obstetrician or midwife if:   · You have a fever  · You have new or worsening pain in your abdomen or vagina  · You continue to have the baby blues 10 days after you deliver  · You have trouble sleeping  · You have foul-smelling discharge from your vagina  · You have pain or burning when you urinate  · You do not have a bowel movement for 3 days or more  · You have nausea or are vomiting  · You have hard lumps or red streaks over your breasts  · You have cracked nipples or bleed from your nipples  · You have questions or concerns about your condition or care  Physical changes:   The following are normal changes after you give birth:  · Pain in the area between your anus and vagina    · Breast pain    · Constipation or hemorrhoids    · Hot or cold flashes    · Vaginal bleeding or discharge    · Mild to moderate abdominal cramping    · Difficulty controlling bowel movements or urine  Emotional changes: The following are symptoms of the baby blues, or normal emotions after you give birth  The changes in your emotions may be caused by a drop in hormone levels after you deliver  If these symptoms last longer than 1 to 2 weeks after you give birth, you may have postpartum depression  · Feeling irritable    · Feeling sad    · Crying for no reason    · Feeling anxious  Breast care for nursing mothers: You may have sore breasts for 3 to 6 days after you give birth  This happens as your milk begins to fill your breasts  You may also have sore breasts if you do not breastfeed frequently  Do the following to care for your breasts:  · Apply a moist, warm, compress to your breast as directed  This may help soothe your breasts  Make sure the washcloth is not too hot before you apply it to your breast      · Nurse your baby or pump your milk frequently  This may prevent clogged milk ducts  Ask your healthcare provider how often to nurse or pump  · Massage your breasts as directed  This may help increase your milk flow  Gently rub your breasts in a circular motion before you breastfeed  You may need to gently squeeze your breast or nipple to help release milk  You can also use a breast pump to help release milk from your breast      · Wash your breasts with warm water only  Do not put soap on your nipples  Soap may cause your nipples to become dry  · Apply lanolin cream to your nipples as directed  Lanolin cream may add moisture to your skin and prevent nipple dryness  Always  wash off lanolin cream with warm water before you breastfeed  · Place pads in your bra  Your nipples may leak milk when you are not breastfeeding  You can place pads inside of your bra to help prevent leaking onto your clothing   Ask your healthcare provider where to purchase bra pads  · Get breastfeeding support if needed  There are healthcare providers who can answer questions about breastfeeding and provide you with support  Ask your healthcare provider who you can contact if you need breastfeeding support  Breast care for non-nursing mothers:  Milk will fill your breasts even if you bottle feed your baby  Do the following to help stop your milk from filling your breasts and causing pain:  · Wear a bra with support at all times  A sports bra or a tight-fitting bra will help stop your milk from coming in  · Apply ice on each breast for 15 to 20 minutes every hour or as directed  Use an ice pack, or put crushed ice in a plastic bag  Cover it with a towel  Ice helps your milk ducts shrink  · Keep your breasts away from warm water  Warm water will make it easier for milk to fill your breasts  Stand with your breasts away from warm water in the shower  · Limit how much you touch your breasts  This will prevent them from filling with milk  Perineum care: Your perineum is the area between your rectum and vagina  It is normal to have swelling and pain in this area after you give birth  If you had an episiotomy, your healthcare provider may give you special instructions  · Clean your perineum after you use the bathroom  This may prevent infection and help with healing  Use a spray bottle with warm water to clean your perineum  You may also gently spray warm water against your perineum when you urinate  Always wipe front to back  · Take a sitz bath as directed  A sitz bath may help relieve swelling and pain  Fill your bath tub or bucket with water up to your hips and sit in the water  Use cold water for 2 days after you deliver  Then use warm water  Ask your healthcare provider for more information about a sitz bath  · Apply ice packs for the first 24 hours or as directed  Use a plastic glove filled with ice or buy an ice pack   Wrap the ice pack or plastic glove in a small towel or wash cloth  Place the ice pack on your perineum for 20 minutes at a time  · Sit on a donut-shaped pillow  This may relieve pressure on your perineum when you sit  · Use wipes with medicine or take pills as directed  Your healthcare provider may tell you to use witch hazel pads  You can place witch hazel pads in the refrigerator before you apply them to your perineum  He may also tell you to take NSAIDs  Ask your healthcare provider how often to take pills or use wipes with medicine  · Do not go swimming or take tub baths for 4 to 6 weeks or as directed  This will help prevent an infection in your vagina or uterus  Bowel and bladder care: It may take 3 to 5 days to have a bowel movement after you deliver your baby  You can do the following to prevent or manage constipation, and get control of your bowel or bladder:  · Take stool softeners as directed  A stool softener is medicine that will make your bowel movements softer  This may prevent or relieve constipation  A stool softener may also make bowel movements less painful  · Drink plenty of liquids  Ask how much liquid to drink each day and which liquids are best for you  Liquids may help prevent constipation  · Eat foods high in fiber  Examples include fruits, vegetables, grains, beans, and lentils  Ask your healthcare provider how much fiber you need each day  Fiber may prevent constipation  · Do Kegel exercises as directed  Kegel exercises will help strengthen the muscles that control bowel movements and urination  Ask your healthcare provider for more information on Kegel exercises  · Apply cold compresses or medicine to hemorrhoids as directed  This may relieve swelling and pain  Your healthcare provider may tell you to apply ice or wipes with medicine to your hemorrhoids  He may also tell you to use a sitz bath   Ask your healthcare for more information on how to manage hemorrhoids  Nutrition:  Good nutrition is important in the postpartum period  It will help you return to a healthy weight, increase your energy levels, and prevent constipation  It will also help you get enough nutrients and calories if you are going to breastfeed your baby  · Eat a variety of healthy foods  Healthy foods include fruits, vegetables, whole-grain breads, low-fat dairy products, beans, lean meats, and fish  You may need 500 to 700 additional calories each day if you breastfeed your baby  You may also need extra protein  · Limit foods with added sugar and high amounts of fat  These foods are high in calories and low in healthy nutrients  Read food labels so you know how much sugar and fat is in the food you want to eat  · Drink 8 to 10 glasses of water per day  Water will help you make plenty of milk for your baby  It will also help prevent constipation  Drink a glass of water every time you breastfeed your baby  · Take vitamins as directed  Ask your healthcare provider what vitamins you need  · Limit caffeine and alcohol if you are breastfeeding  Caffeine and alcohol can get into your breast milk  Caffeine and alcohol can make your baby fussy  They can also interfere with your baby's sleep  Ask your healthcare provider if you can drink alcohol or caffeine  Rest and sleep: You may feel very tired in the postpartum period  Enough sleep will help you heal and give you energy to care for your baby  The following may help you get sleep and rest:  · Nap when your baby naps  Your baby may nap several times during the day  Get rest during this time  · Limit visitors  Many people may want to see you and your baby  Ask friends or family to visit on different days  This will give you time to rest      · Do not plan too much for one day  Put off household chores so that you have time to rest  Gradually do more each day  · Ask for help from family, friends, or neighbors    Ask them to help you with laundry, cleaning, or errands  Also ask someone to watch the baby while you take a nap or relax  Ask your partner to help with the care of your baby  Pump some of your breast milk so your partner can feed your baby during the night  Exercise after delivery:  Wait until your healthcare provider says it is okay to exercise  Exercise can help you lose weight, increase your energy levels, and manage your mood  It can also prevent constipation and blood clots  Start with gentle exercises such as walking  Do more as you have more energy  You may need to avoid abdominal exercises for 1 to 2 weeks after you deliver  Talk to your healthcare provider about an exercise plan that is right for you  Sexual activity after delivery:   · Do not have sex until your healthcare provider says it is okay  You may need to wait 4 to 6 weeks before you have sex  This may prevent infection and allow time to heal      · Your menstrual cycle may begin as soon as 3 weeks after you deliver  Your period may be delayed if you breastfeed your baby  You can become pregnant before you get your first postpartum period  Talk to your healthcare provider about birth control that is right for you  Some types of birth control are not safe during breastfeeding  For support and more information:  Join a support group for new mothers  Ask for help from family and friends with chores, errands, and care of your baby  · Office of Women's Health, [place] Department of Health and Human Services  [address], [address] [place] [place]  [place] , [address]  [address], [address] [address] [place]  [place] , [address]  Phone: [phone]  Web Address: www womenshealth gov  · [facility] [address] , [address] [place] [address]  [address]  Web Address: [employer]  org/pregnancy/postpartum-care  aspx  Follow up with your obstetrician or midwife as directed:   You will need to follow up with your healthcare provider in 2 to 6 weeks after delivery  Write down your questions so you remember to ask them at your visits  © 2017 [address] Information is for End User's use only and may not be sold, redistributed or otherwise used for commercial purposes  All illustrations and images included in CareNotes® are the copyrighted property of A D A M , Inc  or Patrick Sigala  The above information is an  only  It is not intended as medical advice for individual conditions or treatments  Talk to your doctor, nurse or pharmacist before following any medical regimen to see if it is safe and effective for you

## 2019-12-08 NOTE — PROGRESS NOTES
Progress Note - OB/GYN   Ary Kamara 29 y o  female MRN: [id number]  Unit/Bed#: -01 Encounter: [id number]    Assessment:  Post partum Day #2 s/p , stable, baby in room    Plan:  1) Gestational HTN    -CBC and CMP WNL   - BP: 120-140/60-70 since delivery   - Asymptomatic   2) Manual exploration of the uterus   - Allergic to cephalexin (hives)   - [place]/gent x1 dose given for prophylaxis   - Afebrile  3) Continue routine post partum care   Encourage ambulation   Encourage breastfeeding   Anticipate discharge 19     Subjective/Objective   Chief Complaint:     Post delivery  Patient is doing well  Lochia WNL  Pain well controlled  Subjective:     Pain: yes, cramping, improved with meds  Tolerating PO: yes  Voiding: yes  Flatus: yes  BM: no  Ambulating: yes  Breastfeeding:  yes  Chest pain: no  Shortness of breath: no  Leg pain: no  Lochia: minimal    Objective:     Vitals: /86   Pulse 80   Temp 99 1 °F (37 3 °C) (Oral)   Resp 20   Ht 5' 3" (1 6 m)   Wt 66 2 kg (146 lb)   LMP 2019   SpO2 98%   Breastfeeding? Yes   BMI 25 86 kg/m²     No intake or output data in the 24 hours ending 19 0802    Lab Results   Component Value Date    WBC 11 63 (H) 2019    HGB 10 8 (L) 2019    HCT 32 6 (L) 2019    MCV 89 2019     2019       Physical Exam:     Gen: AAOx3, NAD  CV: RRR  Lungs: CTA b/l  Abd: Soft, non-tender, non-distended, no rebound or guarding  Uterine fundus firm and non-tender, -2 cm below the umbilicus     Ext: Non tender, Negative Ethan's sign bilaterally    Don De La O MD  2019  8:02 AM

## 2019-12-08 NOTE — PLAN OF CARE
Problem: PAIN - ADULT  Goal: Verbalizes/displays adequate comfort level or baseline comfort level  Description  Interventions:  - Encourage patient to monitor pain and request assistance  - Assess pain using appropriate pain scale  - Administer analgesics based on type and severity of pain and evaluate response  - Implement non-pharmacological measures as appropriate and evaluate response  - Consider cultural and social influences on pain and pain management  - Notify physician/advanced practitioner if interventions unsuccessful or patient reports new pain  Outcome: Progressing     Problem: INFECTION - ADULT  Goal: Absence or prevention of progression during hospitalization  Description  INTERVENTIONS:  - Assess and monitor for signs and symptoms of infection  - Monitor lab/diagnostic results  - Monitor all insertion sites, i e  indwelling lines, tubes, and drains  - Monitor endotracheal if appropriate and nasal secretions for changes in amount and color  - [place] appropriate cooling/warming therapies per order  - Administer medications as ordered  - Instruct and encourage patient and family to use good hand hygiene technique  - Identify and instruct in appropriate isolation precautions for identified infection/condition  Outcome: Progressing  Goal: Absence of fever/infection during neutropenic period  Description  INTERVENTIONS:  - Monitor WBC    Outcome: Progressing     Problem: SAFETY ADULT  Goal: Patient will remain free of falls  Description  INTERVENTIONS:  - Assess patient frequently for physical needs  -  Identify cognitive and physical deficits and behaviors that affect risk of falls    -  [place] fall precautions as indicated by assessment   - Educate patient/family on patient safety including physical limitations  - Instruct patient to call for assistance with activity based on assessment  - Modify environment to reduce risk of injury  - Consider OT/PT consult to assist with strengthening/mobility  Outcome: Progressing  Goal: Maintain or return to baseline ADL function  Description  INTERVENTIONS:  -  Assess patient's ability to carry out ADLs; assess patient's baseline for ADL function and identify physical deficits which impact ability to perform ADLs (bathing, care of mouth/teeth, toileting, grooming, dressing, etc )  - Assess/evaluate cause of self-care deficits   - Assess range of motion  - Assess patient's mobility; develop plan if impaired  - Assess patient's need for assistive devices and provide as appropriate  - Encourage maximum independence but intervene and supervise when necessary  - Involve family in performance of ADLs  - Assess for home care needs following discharge   - Consider OT consult to assist with ADL evaluation and planning for discharge  - Provide patient education as appropriate  Outcome: Progressing  Goal: Maintain or return mobility status to optimal level  Description  INTERVENTIONS:  - Assess patient's baseline mobility status (ambulation, transfers, stairs, etc )    - Identify cognitive and physical deficits and behaviors that affect mobility  - Identify mobility aids required to assist with transfers and/or ambulation (gait belt, sit-to-stand, lift, walker, cane, etc )  - [place] fall precautions as indicated by assessment  - Record patient progress and toleration of activity level on Mobility SBAR; progress patient to next Phase/Stage  - Instruct patient to call for assistance with activity based on assessment  - Consider rehabilitation consult to assist with strengthening/weightbearing, etc   Outcome: Progressing     Problem: Knowledge Deficit  Goal: Patient/family/caregiver demonstrates understanding of disease process, treatment plan, medications, and discharge instructions  Description  Complete learning assessment and assess knowledge base    Interventions:  - Provide teaching at level of understanding  - Provide teaching via preferred learning methods  Outcome: Progressing     Problem: DISCHARGE PLANNING  Goal: Discharge to home or other facility with appropriate resources  Description  INTERVENTIONS:  - Identify barriers to discharge w/patient and caregiver  - Arrange for needed discharge resources and transportation as appropriate  - Identify discharge learning needs (meds, wound care, etc )  - Arrange for interpretive services to assist at discharge as needed  - Refer to Case Management Department for coordinating discharge planning if the patient needs post-hospital services based on physician/advanced practitioner order or complex needs related to functional status, cognitive ability, or social support system  Outcome: Progressing

## 2019-12-08 NOTE — PLAN OF CARE
Problem: PAIN - ADULT  Goal: Verbalizes/displays adequate comfort level or baseline comfort level  Description  Interventions:  - Encourage patient to monitor pain and request assistance  - Assess pain using appropriate pain scale  - Administer analgesics based on type and severity of pain and evaluate response  - Implement non-pharmacological measures as appropriate and evaluate response  - Consider cultural and social influences on pain and pain management  - Notify physician/advanced practitioner if interventions unsuccessful or patient reports new pain  Outcome: Completed     Problem: INFECTION - ADULT  Goal: Absence or prevention of progression during hospitalization  Description  INTERVENTIONS:  - Assess and monitor for signs and symptoms of infection  - Monitor lab/diagnostic results  - Monitor all insertion sites, i e  indwelling lines, tubes, and drains  - Monitor endotracheal if appropriate and nasal secretions for changes in amount and color  - [place] appropriate cooling/warming therapies per order  - Administer medications as ordered  - Instruct and encourage patient and family to use good hand hygiene technique  - Identify and instruct in appropriate isolation precautions for identified infection/condition  Outcome: Completed  Goal: Absence of fever/infection during neutropenic period  Description  INTERVENTIONS:  - Monitor WBC    Outcome: Completed     Problem: SAFETY ADULT  Goal: Patient will remain free of falls  Description  INTERVENTIONS:  - Assess patient frequently for physical needs  -  Identify cognitive and physical deficits and behaviors that affect risk of falls    -  [place] fall precautions as indicated by assessment   - Educate patient/family on patient safety including physical limitations  - Instruct patient to call for assistance with activity based on assessment  - Modify environment to reduce risk of injury  - Consider OT/PT consult to assist with strengthening/mobility  Outcome: Completed  Goal: Maintain or return to baseline ADL function  Description  INTERVENTIONS:  -  Assess patient's ability to carry out ADLs; assess patient's baseline for ADL function and identify physical deficits which impact ability to perform ADLs (bathing, care of mouth/teeth, toileting, grooming, dressing, etc )  - Assess/evaluate cause of self-care deficits   - Assess range of motion  - Assess patient's mobility; develop plan if impaired  - Assess patient's need for assistive devices and provide as appropriate  - Encourage maximum independence but intervene and supervise when necessary  - Involve family in performance of ADLs  - Assess for home care needs following discharge   - Consider OT consult to assist with ADL evaluation and planning for discharge  - Provide patient education as appropriate  Outcome: Completed  Goal: Maintain or return mobility status to optimal level  Description  INTERVENTIONS:  - Assess patient's baseline mobility status (ambulation, transfers, stairs, etc )    - Identify cognitive and physical deficits and behaviors that affect mobility  - Identify mobility aids required to assist with transfers and/or ambulation (gait belt, sit-to-stand, lift, walker, cane, etc )  - [place] fall precautions as indicated by assessment  - Record patient progress and toleration of activity level on Mobility SBAR; progress patient to next Phase/Stage  - Instruct patient to call for assistance with activity based on assessment  - Consider rehabilitation consult to assist with strengthening/weightbearing, etc   Outcome: Completed     Problem: Knowledge Deficit  Goal: Patient/family/caregiver demonstrates understanding of disease process, treatment plan, medications, and discharge instructions  Description  Complete learning assessment and assess knowledge base    Interventions:  - Provide teaching at level of understanding  - Provide teaching via preferred learning methods  Outcome: Completed     Problem: DISCHARGE PLANNING  Goal: Discharge to home or other facility with appropriate resources  Description  INTERVENTIONS:  - Identify barriers to discharge w/patient and caregiver  - Arrange for needed discharge resources and transportation as appropriate  - Identify discharge learning needs (meds, wound care, etc )  - Arrange for interpretive services to assist at discharge as needed  - Refer to Case Management Department for coordinating discharge planning if the patient needs post-hospital services based on physician/advanced practitioner order or complex needs related to functional status, cognitive ability, or social support system  Outcome: Completed

## 2019-12-09 LAB — RPR SER QL: NORMAL

## 2020-01-08 ENCOUNTER — POSTPARTUM VISIT (OUTPATIENT)
Dept: OBGYN CLINIC | Facility: CLINIC | Age: 35
End: 2020-01-08

## 2020-01-08 VITALS — BODY MASS INDEX: 21.97 KG/M2 | SYSTOLIC BLOOD PRESSURE: 100 MMHG | DIASTOLIC BLOOD PRESSURE: 60 MMHG | WEIGHT: 124 LBS

## 2020-01-08 DIAGNOSIS — R87.612 LGSIL OF CERVIX OF UNDETERMINED SIGNIFICANCE: ICD-10-CM

## 2020-01-08 DIAGNOSIS — Z30.013 ENCOUNTER FOR INITIAL PRESCRIPTION OF INJECTABLE CONTRACEPTIVE: ICD-10-CM

## 2020-01-08 PROCEDURE — 87624 HPV HI-RISK TYP POOLED RSLT: CPT | Performed by: OBSTETRICS & GYNECOLOGY

## 2020-01-08 PROCEDURE — G0145 SCR C/V CYTO,THINLAYER,RESCR: HCPCS | Performed by: PATHOLOGY

## 2020-01-08 PROCEDURE — G0124 SCREEN C/V THIN LAYER BY MD: HCPCS | Performed by: PATHOLOGY

## 2020-01-08 PROCEDURE — 99024 POSTOP FOLLOW-UP VISIT: CPT | Performed by: OBSTETRICS & GYNECOLOGY

## 2020-01-08 RX ORDER — MEDROXYPROGESTERONE ACETATE 150 MG/ML
150 INJECTION, SUSPENSION INTRAMUSCULAR
Qty: 1 ML | Refills: 0 | Status: SHIPPED | OUTPATIENT
Start: 2020-01-08 | End: 2020-05-21

## 2020-01-09 ENCOUNTER — TRANSCRIBE ORDERS (OUTPATIENT)
Dept: NEUROSURGERY | Facility: CLINIC | Age: 35
End: 2020-01-09

## 2020-01-09 ENCOUNTER — CLINICAL SUPPORT (OUTPATIENT)
Dept: OBGYN CLINIC | Facility: CLINIC | Age: 35
End: 2020-01-09
Payer: COMMERCIAL

## 2020-01-09 ENCOUNTER — TRANSCRIBE ORDERS (OUTPATIENT)
Dept: ADMINISTRATIVE | Facility: HOSPITAL | Age: 35
End: 2020-01-09

## 2020-01-09 VITALS — SYSTOLIC BLOOD PRESSURE: 99 MMHG | BODY MASS INDEX: 22.46 KG/M2 | WEIGHT: 126.8 LBS | DIASTOLIC BLOOD PRESSURE: 70 MMHG

## 2020-01-09 DIAGNOSIS — G95.0 SYRINGOMYELIA (HCC): Primary | ICD-10-CM

## 2020-01-09 DIAGNOSIS — Z30.42 ENCOUNTER FOR DEPO-PROVERA CONTRACEPTION: Primary | ICD-10-CM

## 2020-01-09 DIAGNOSIS — M54.12 RADICULOPATHY, CERVICAL: ICD-10-CM

## 2020-01-09 LAB — SL AMB POCT URINE HCG: NORMAL

## 2020-01-09 PROCEDURE — 96372 THER/PROPH/DIAG INJ SC/IM: CPT | Performed by: OBSTETRICS & GYNECOLOGY

## 2020-01-09 PROCEDURE — 81025 URINE PREGNANCY TEST: CPT

## 2020-01-09 RX ORDER — MEDROXYPROGESTERONE ACETATE 150 MG/ML
150 INJECTION, SUSPENSION INTRAMUSCULAR ONCE
Status: COMPLETED | OUTPATIENT
Start: 2020-01-09 | End: 2020-01-09

## 2020-01-09 RX ADMIN — MEDROXYPROGESTERONE ACETATE 150 MG: 150 INJECTION, SUSPENSION INTRAMUSCULAR at 15:10

## 2020-01-09 NOTE — PROGRESS NOTES
Pt is here for Depo Provera injection  This is her first dose  She was seen on 1/8/2020 for PP visit with Dr Emmett Stevens  Urine pregnancy test done: Y  Result: negative  Depo given in R buttock  Tolerated well  Lot: [id number]  Exp: 9/30/2023  Pt is pending tubal ligation surgery

## 2020-01-13 PROBLEM — Z3A.37 37 WEEKS GESTATION OF PREGNANCY: Status: RESOLVED | Noted: 2019-12-02 | Resolved: 2020-01-13

## 2020-01-13 PROBLEM — O43.92 ABNORMAL PLACENTA AFFECTING MANAGEMENT OF MOTHER IN SECOND TRIMESTER: Status: RESOLVED | Noted: 2019-08-08 | Resolved: 2020-01-13

## 2020-01-13 PROBLEM — M54.9 BACK PAIN AFFECTING PREGNANCY IN SECOND TRIMESTER: Status: RESOLVED | Noted: 2019-08-09 | Resolved: 2020-01-13

## 2020-01-13 PROBLEM — Z34.93 SUPERVISION OF NORMAL PREGNANCY IN THIRD TRIMESTER: Status: RESOLVED | Noted: 2019-06-17 | Resolved: 2020-01-13

## 2020-01-13 PROBLEM — O99.891 BACK PAIN AFFECTING PREGNANCY IN SECOND TRIMESTER: Status: RESOLVED | Noted: 2019-08-09 | Resolved: 2020-01-13

## 2020-01-13 NOTE — PROGRESS NOTES
Ary Borjarell Bolds  [birth year]    S:  29 y o  female here for postpartum visit  She is s/p Uncomplicated vaginal delivery on 12/6/2019   Gender: male "Carrol Hong"  Weight: 5lb 12oz  Her lochia has resolved  She is Using breast pump without problems  She denies postpartum blues/depression  Her EPDS is 11  She overall feels she is doing okay  Last Pap: 10/15/18 - LSIL, other HPV pos    We discussed contraceptive options in detail including birth control pills, patches, NuvaRing, DepoProvera, Mirena/Kyleena IUD, Nexplanon in detail  At this point she would like a tubal ligation  She is certain she has completed childbearing  Her partner is younger and not interested in a vasectomy  O:   /60 (BP Location: Left arm, Patient Position: Sitting, Cuff Size: Standard)   Wt 56 2 kg (124 lb)   LMP 03/21/2019 (LMP Unknown)   Breastfeeding? Yes   BMI 21 97 kg/m²   She appears well and in no distress  Abdomen is soft and nontender  External genitals are normal  Vagina is normal  Cervix, uterus and adnexa are nontender, no masses palpable  A/P:  Postpartum visit  Abnormal pap - has been >1yr since her pap, this was collected today with cotesting - will await results and likely colpo before scheduling her tubal ligation  Will plan depo provera bridge for contraception  She will also schedule follow up with her neurosurgeon before her procedure  Discussed consent for Laparoscopic Bilateral Salpingectomy  We discussed that this is removal of both of her fallopian tubes  She is aware that this is not a reversible procedure  We discussed risks of any surgery including bleeding, infection, blood clots, wound healing problems, damage to surrounding organs, need for laparotomy  We also discussed procedure specific risks of failure and regret  All questions were answered

## 2020-01-14 LAB
LAB AP GYN PRIMARY INTERPRETATION: ABNORMAL
Lab: ABNORMAL
PATH INTERP SPEC-IMP: ABNORMAL

## 2020-01-15 ENCOUNTER — OFFICE VISIT (OUTPATIENT)
Dept: POSTPARTUM | Facility: CLINIC | Age: 35
End: 2020-01-15
Payer: COMMERCIAL

## 2020-01-15 VITALS — DIASTOLIC BLOOD PRESSURE: 80 MMHG | SYSTOLIC BLOOD PRESSURE: 100 MMHG

## 2020-01-15 DIAGNOSIS — Z71.89 ENCOUNTER FOR BREAST FEEDING COUNSELING: Primary | ICD-10-CM

## 2020-01-15 DIAGNOSIS — O92.70 LACTATION PROBLEM: ICD-10-CM

## 2020-01-15 PROCEDURE — 99404 PREV MED CNSL INDIV APPRX 60: CPT | Performed by: PEDIATRICS

## 2020-01-15 NOTE — PROGRESS NOTES
INITIAL BREAST FEEDING EVALUATION    Informant/Relationship: Ary    Discussion of General Lactation Issues: Mike Duarte had trouble latching after birth and was supplemented with expressed colostrum and formula due to weight loss  Ary developed PPD and stopped pumping quite some time ago  Mike Duarte has not fed at the breast because he gets frustrated  Currently he is exclusively bottle fed with about 25% of his intake being breastmilk  Infant is 10 weeks old today   History:  Fertility Problem:no  Breast changes:yes - breasts got larger,  nipples and areola got larger and darker  : yes - not induced  Full term:no   labor:yes - 37 1/7 weeks  First nursing/attempt < 1 hour after birth:yes - baby latched  Skin to skin following delivery:yes - until after the first feeding  Breast changes after delivery:yes - milk came in on day 4-5  Rooming in (infant in room with mother with exception of procedures, eg  Circumcision: yes - did not leave mother  Blood sugar issues:no  NICU stay:no  Jaundice:yes - monitored until discharge  Phototherapy:no  Supplement given: (list supplement and method used as well as reason(s):yes - expressed colostrum via finger feeding    Past Medical History:   Diagnosis Date    Chiari I malformation ([facility] [address] )     UTI (urinary tract infection)     treated with antibiotics recently will have ua repeated     Varicella     childhood         Current Outpatient Medications:     medroxyPROGESTERone (DEPO-PROVERA) 150 mg/mL injection, Inject 1 mL (150 mg total) into a muscle every 3 (three) months, Disp: 1 mL, Rfl: 0    Allergies   Allergen Reactions    Cephalexin      Annotation - [id number]: gives hives    Sunscreens Hives       Social History     Substance and Sexual Activity   Drug Use No       Social History Never a smoker    Interval Breastfeeding History:    Frequency of breast feeding: None currently    Has not attempted since the first week  Does mother feel breastfeeding is effective: No  Does infant appear satisfied after nursing:No  Stooling pattern normal: Yes  Urinating frequently:Yes  Using shield or shells: No    Alternative/Artificial Feedings:   Bottle: Yes, currently for every feeding  Cup: No  Syringe/Finger: No           Formula Type: Nutramigen                     Amount: 4 ounces            Breast Milk:                      Amount: 4 ounces when available  About 1 in 4 feedings  Frequency Q 2-3 Hr between feedings  Elimination Problems: Yes, struggles to pass stools      Equipment:  Nipple Shield             Type: Haakaa             Size: unknown             Frequency of Use: Tried for the first time yesterday  Was painful for Ary  Pump            Type: Spectra S2            Frequency of Use: Every 2-3 hours during the day  And once overnight  Expresses about 2 ounces per session  Shells            Type: none            Frequency of use: n/a    Equipment Problems: no    Mom:  Breast: Small symmetrical breasts  Soft  Nipple Assessment in General: Large everted irregularly shaped nipples  Mother's Awareness of Feeding Cues                 Recognizes: Yes                  Verbalizes: Yes  Support System: FOB, extended family  History of Breastfeeding: Exclusively pumped for two older children for about 6 months each  Changes/Stressors/Violence: Ary has been stressed by her PPD, breastfeeding difficulty and now has some remorse about weaning  Concerns/Goals: Ary would like to increase her supply to the point that she does not need to feed formula  She would also like to be able to feed Riddhi Donning at the breast if possible  Problems with Mom: Concerns with supply, PPD    Physical Exam   Constitutional: She is oriented to person, place, and time  She appears well-developed and well-nourished  HENT:   Head: Normocephalic and atraumatic  Neck: Normal range of motion  Neck supple     Cardiovascular: Normal rate, regular rhythm and normal heart sounds  Pulmonary/Chest: Effort normal and breath sounds normal    Musculoskeletal: Normal range of motion  Neurological: She is alert and oriented to person, place, and time  Skin: Skin is warm and dry  Psychiatric: She has a normal mood and affect  Her behavior is normal  Judgment and thought content normal        Infant:  Behaviors: Alert  Color: Pink  Birth weight: 2595gram  Current weight: 4145gram    Problems with infant: Won't latch or nurse effectively  ?tongue tie      General Appearance:  Alert, active, no distress                             Head:  Normocephalic, AFOF, sutures opposed                             Eyes:  Conjunctiva clear, no drainage                              Ears:  Normally placed, no anomolies                             Nose:  no drainage or erythema                           Mouth:  No lesions  Tongue extends to the lower lip, lateralizes well and tip elevates to mid mouth  Complete cupping of my finger while sucking with peristalsis originating at the tip of the tongue  Lingual frenulum is thin, short and attached near the tip of the tongue                     Neck:  Supple, symmetrical, trachea midline                 Respiratory:  No grunting, flaring, retractions, breath sounds clear and equal            Cardiovascular:  Regular rate and rhythm  No murmur  Adequate perfusion/capillary refill  Abdomen:   Soft, non-tender, no masses, bowel sounds present, no HSM             Genitourinary:  Normal male, testes descended, no discharge, swelling, or pain                          Spine:   No abnormalities noted        Musculoskeletal:  Full range of motion          Skin/Hair/Nails:   Skin warm, dry, and intact, no rashes or abnormal dyspigmentation or lesions                Neurologic:   No abnormal movement, tone appropriate      Latch:  Efficiency:               Lips Flanged:  Yes              Depth of latch: wide Audible Swallow: Yes, frequently              Visible Milk: Yes              Wide Open/ Asymmetrical: Yes              Suck Swallow Cycle: Breathing: unlabored, Coordinated: yes  Nipple Assessment after latch: Subtle compression of the nipple  Latch Problems: None  Skinny Berger latched quickly and effectively and began to nurse  Ary had no pain  Skinny Berger nursed contentedly for quite some time  Occasionally he appeared to "chomp" on the breast rather than suckle  Position:  Infant's Ergonomics/Body               Body Alignment: Yes               Head Supported: Yes               Close to Mom's body/ Lifted/ Supported: Yes               Mom's Ergonomics/Body: Yes                           Supported: Yes                           Sitting Back: Yes                           Brings Baby to her breast: Yes  Positioning Problems: None      Handouts:   Paced bottle feeding, Hands on pumping, Hand expression, Increasing your supply and Latch Check List    Education:  Reviewed Latch: Demonstrated how to gently compress the breast and align the baby so that his nose is just above the nipple with his lower lip and chin touching the breast to encourage the deepest, widest, off-center latch  Reviewed Positioning for Dyad: Demonstrated hand positioning for breast compression during latch  Reviewed Frequency/Supply & Demand: Discussed how milk supply relies on how frequently and effectively the breasts are emptied  Reviewed Alternative/Artificial Feedings: Discussed and demonstrated paced bottle feeding  Reviewed Equipment: Discussed the use and features of the Spectra S2 and the elements of hands on pumping,      Plan:  Offer the breast as often as Ary is comfortable doing so paying close attention to positioning  Supplement with expressed milk or formula as needed via paced bottle feeding  Additional hand expression and pumping as Ary desires  Follow up as scheduled      I have spent 90 minutes with Patient and family today in which greater than 50% of this time was spent in counseling/coordination of care regarding Patient and family education

## 2020-01-15 NOTE — PATIENT INSTRUCTIONS
Offer the breast on demand as often as Ary is comfortable doing so, paying close attention to positioning for a deeper latch  Refer to the instructional video "Attaching Your Baby at the Breast" on the [address] [place] [address] website for further review  You can offer each breast more than once until Paresh Christopher is content or you can supplement after feeding at the breast if he needs more  When feeding your expressed milk or formula, use paced bottle feeding  This method is less stressful for your baby, prevents overfeeding and protects the breastfeeding relationship  Pumping and hand expression in addition to putting Pareshstephenie Christopher to the breast can help increase supply  When pumping,Cycle your pump through stimulation and expression mode several times in a session to stimulate several let downs  Use hands on pumping and hand expression to increase your output  Maintain your pump as recommended  Follow up as scheduled  Please call with any questions or concerns

## 2020-01-16 NOTE — PROGRESS NOTES
I have reviewed the notes, assessments, and/or procedures performed by Alyce Bustamante RN, IBCLC, I concur with her/his documentation of Alyssa Collier MD 01/15/20

## 2020-01-27 ENCOUNTER — TRANSCRIBE ORDERS (OUTPATIENT)
Dept: ADMINISTRATIVE | Facility: HOSPITAL | Age: 35
End: 2020-01-27

## 2020-01-27 ENCOUNTER — TELEPHONE (OUTPATIENT)
Dept: NEUROSURGERY | Facility: CLINIC | Age: 35
End: 2020-01-27

## 2020-01-27 DIAGNOSIS — G95.0 SYRINGOMYELIA (HCC): Primary | ICD-10-CM

## 2020-02-04 ENCOUNTER — HOSPITAL ENCOUNTER (OUTPATIENT)
Dept: RADIOLOGY | Facility: HOSPITAL | Age: 35
Discharge: HOME | End: 2020-02-04
Attending: NEUROLOGICAL SURGERY
Payer: COMMERCIAL

## 2020-02-04 DIAGNOSIS — G95.0 SYRINGOMYELIA (HCC): ICD-10-CM

## 2020-02-04 PROCEDURE — 72141 MRI NECK SPINE W/O DYE: CPT

## 2020-02-10 ENCOUNTER — TELEPHONE (OUTPATIENT)
Dept: OBGYN CLINIC | Facility: CLINIC | Age: 35
End: 2020-02-10

## 2020-02-10 NOTE — TELEPHONE ENCOUNTER
Patient cancelled her appointment for 2/10/20  She has a ASC-H pap smear +HPV other  Please make sure she r/s her appointment and stress importance of this diagnosis

## 2020-02-11 ENCOUNTER — TELEPHONE (OUTPATIENT)
Dept: NEUROSURGERY | Facility: CLINIC | Age: 35
End: 2020-02-11

## 2020-02-11 NOTE — TELEPHONE ENCOUNTER
Called and spoke with patient about her appointment for today and that we would need to reschedule because Dr Mejia Moreno is in the OR for urgent case   She understood and appointment was rescheduled to Thursday 2/13/20 at 3:45PM

## 2020-02-13 ENCOUNTER — OFFICE VISIT (OUTPATIENT)
Dept: NEUROSURGERY | Facility: CLINIC | Age: 35
End: 2020-02-13
Payer: COMMERCIAL

## 2020-02-13 VITALS
BODY MASS INDEX: 22.06 KG/M2 | WEIGHT: 129.2 LBS | RESPIRATION RATE: 16 BRPM | DIASTOLIC BLOOD PRESSURE: 67 MMHG | TEMPERATURE: 98.2 F | HEIGHT: 64 IN | HEART RATE: 74 BPM | SYSTOLIC BLOOD PRESSURE: 122 MMHG

## 2020-02-13 DIAGNOSIS — G95.0 SYRINGOMYELIA (HCC): Primary | ICD-10-CM

## 2020-02-13 PROCEDURE — 99213 OFFICE O/P EST LOW 20 MIN: CPT | Performed by: NEUROLOGICAL SURGERY

## 2020-02-13 NOTE — PROGRESS NOTES
[facility] Office Note  Annabella Dumont is a 29 y o  female      Visit Type: Follow-up      Diagnoses and all orders for this visit:    Syringomyelia ([address] )  -     MRI cervical spine without contrast; Future        DISCUSSION SUMMARY  This is a 58-year-old female who is status post a suboccipital decompressive craniectomy and C1 laminectomy for a Chiari malformation  She also status post anterior cervical diskectomy and total disc arthroplasty at C5-6  She is being followed for her syringomyelia which is improved but continues to be present  Continued follow-up is essential     Return in about 1 year (around 2/13/2021) for Routine F/U after test completed (SNPX PA-DKO)  CHIEF COMPLAINT  Patient presents for 6 month follow up with MRI C spine    NEUROSURGERY PROCEDURES  9/16/11 (Dr Bina Boone) Chiari decompression without C1 laminectomy  12/14/16 (DKO) Suboccipital decompressive craniectomy, C1 laminectomy and partial C2 laminectomy and duraplasty with placement of fourth ventricular to subarachnoid shunt  1/25/19 (DKO) Anterior cervical discectomy and total disc arthroplasty C5-6  HISTORY OF PRESENT ILLNESS  Patient has history of Chiari malformation and a large holocord syringomyelia  The syrinx had not improved and she continued to be symptomatic via pain of this  It is for these reasons that we recommended surgical intervention  On 12/14/16, patient underwent a Suboccipital decompressive craniectomy, C1 laminectomy and partial C2 laminectomy and duraplasty with placement of fourth ventricular to subarachnoid shunt by Dr Sly Franks  Patient was advised continued follow up for this  The routine scheduled MRI demonstrated a large herniated cervical disc with cord contact  In the setting of ongoing syringomyelia and worsening pain we recommended surgical intervention for decompression and the placement of an artificial disc  To OR on 1/25/2019, as noted above      She is doing fairly well   She does get periodic headaches  These headaches begin at the occipital cervical junction and radiates cephalad  She has neck pain which is tolerated but almost always present  She has numbness and tingling in both arms which is unchanged  She has no worsening of her balance  She has not fallen  She has no heat or cold intolerance  She does not modify her lifestyle at this point because of her syrinx  Continued follow up advised with serial MRI  Review of Systems   Constitutional: Negative  HENT: Negative  Eyes: Negative  Respiratory: Negative  Cardiovascular: Negative  Gastrointestinal: Negative  Endocrine: Negative  Genitourinary: Negative  Musculoskeletal: Positive for neck pain (bilateral into shoulders and arms which is unchanged from surgery)  Skin: Negative  Allergic/Immunologic: Negative  Neurological: Positive for numbness (and tingling in both arms which is also unchanged) and headaches (For about 3 weeks in the entire head)  Hematological: Negative  Psychiatric/Behavioral: Negative  All other systems reviewed and are negative  I reviewed the ROS      MEDICAL HISTORY  Past Medical History:   Diagnosis Date    Chiari I malformation ([facility] [address] )     UTI (urinary tract infection)     treated with antibiotics recently will have ua repeated     Varicella     childhood       Past Surgical History:   Procedure Laterality Date    MT SUBOCCIPT DECOMP MEDULLA/SP CRD N/A 12/14/2016    Procedure: REPEAT SUBOCCIPITAL CRANIECTOMY; C1 LAMINECTOMY AND DURAPLASTY; PLACEMENT OF 4TH VENTRICULAR-SUBARACHNOID SHUNT; IMPULSE MONITORING;  Surgeon: Deborah Graves MD;  [facility]: [facility];  Service: Neurosurgeryx3    MT TOTAL [address] [employer] [address], CERVICAL, SINGLE N/A 1/25/2019    Procedure: Anterior cervical discectomy and total disc arthroplasty C5-6;  Surgeon: Deborah Graves MD;  Location: [facility];  Service: Neurosurgery       Social History     Tobacco Use    Smoking status: Never Smoker    Smokeless tobacco: Never Used   Substance Use Topics    Alcohol use: Not Currently    Drug use: No       Vitals:    02/13/20 1553   BP: 122/67   BP Location: Right arm   Patient Position: Sitting   Cuff Size: Standard   Pulse: 74   Resp: 16   Temp: 98 2 °F (36 8 °C)   TempSrc: Tympanic   Weight: 58 6 kg (129 lb 3 2 oz)   Height: 5' 3 5" (1 613 m)         Current Outpatient Medications:     medroxyPROGESTERone (DEPO-PROVERA) 150 mg/mL injection, Inject 1 mL (150 mg total) into a muscle every 3 (three) months, Disp: 1 mL, Rfl: 0     Allergies   Allergen Reactions    Cephalexin      Annotation - [id number]: gives hives    Sunscreens Hives        The following portions of the patient's history were updated by MA and reviewed by MD: allergies, current medications, past family history, past medical history, past social history, past surgical history and problem list       Physical Exam  Awake alert and oriented  Extraocular movements are intact  Pupils are equal, round, and accommodate  Her facial sensation is equal bilaterally  Facial expression is intact in grimace and at rest  Her tongue protrudes in the midline  Her speech is clear and comprehensible  She has no drift  Her power is 5/5 in the upper extremities  Her finger-to-nose testing is intact  Rapid alternating movements are intact  She has no tremors  Her power is 5/5 in the lower extremities  She can heel walk, toe walk, and perform tandem gait      RESULTS/DATA  I have personally reviewed pertinent films in PACS   An MRI of the cervical spine demonstrates a syrinx which is smaller than it had been but continues to be present

## 2020-02-25 ENCOUNTER — PROCEDURE VISIT (OUTPATIENT)
Dept: OBGYN CLINIC | Facility: CLINIC | Age: 35
End: 2020-02-25
Payer: COMMERCIAL

## 2020-02-25 VITALS — SYSTOLIC BLOOD PRESSURE: 120 MMHG | DIASTOLIC BLOOD PRESSURE: 82 MMHG | BODY MASS INDEX: 22.67 KG/M2 | WEIGHT: 130 LBS

## 2020-02-25 DIAGNOSIS — R87.611 PAP SMEAR OF CERVIX WITH ASCUS, CANNOT EXCLUDE HGSIL: Primary | ICD-10-CM

## 2020-02-25 DIAGNOSIS — B97.7 HPV (HUMAN PAPILLOMA VIRUS) INFECTION: ICD-10-CM

## 2020-02-25 PROCEDURE — 88305 TISSUE EXAM BY PATHOLOGIST: CPT | Performed by: PATHOLOGY

## 2020-02-27 PROCEDURE — 57454 BX/CURETT OF CERVIX W/SCOPE: CPT | Performed by: OBSTETRICS & GYNECOLOGY

## 2020-02-27 NOTE — PROGRESS NOTES
Colposcopy  Date/Time: 2/27/2020 4:14 PM  Performed by: Cuauhtemoc Isidro MD  Authorized by: Cuauhtemoc Isidro MD     Consent:     Consent obtained:  Verbal    Consent given by:  Patient  Pre-procedure:     Prepped with: acetic acid    Indication:     Indication:  ASC-H  Procedure:     Procedure: Colposcopy w/ cervical biopsy and ECC      Under satisfactory analgesia the patient was prepped and draped in the dorsal lithotomy position: yes      [place] speculum was placed in the vagina: yes      Under colposcopic examination the transition zone was seen in entirety: yes      Endocervix was curetted using a Kevorkian curette: yes      Cervical biopsy performed with a cervical biopsy punch: yes      Monsel's solution was applied: yes      Biopsy(s): yes      Location:  6, 12    Specimen to pathology: yes    Post-procedure:     Findings: Friable cervix and White epithelium      Impression: Low grade cervical dysplasia      Patient tolerance of procedure: Tolerated well, no immediate complications  Comments:      /82 (BP Location: Left arm, Patient Position: Sitting, Cuff Size: Standard)   Wt 59 kg (130 lb)   BMI 22 67 kg/m²     If needs LEEP will be done at time of her sterilization

## 2020-02-28 PROBLEM — Z30.2 STERILIZATION: Status: ACTIVE | Noted: 2020-02-28

## 2020-03-10 ENCOUNTER — OFFICE VISIT (OUTPATIENT)
Dept: OBGYN CLINIC | Facility: CLINIC | Age: 35
End: 2020-03-10
Payer: COMMERCIAL

## 2020-03-10 VITALS
DIASTOLIC BLOOD PRESSURE: 70 MMHG | BODY MASS INDEX: 23.21 KG/M2 | SYSTOLIC BLOOD PRESSURE: 118 MMHG | HEIGHT: 63 IN | WEIGHT: 131 LBS

## 2020-03-10 DIAGNOSIS — D06.9 CIN III (CERVICAL INTRAEPITHELIAL NEOPLASIA GRADE III) WITH SEVERE DYSPLASIA: Primary | ICD-10-CM

## 2020-03-10 PROCEDURE — 99213 OFFICE O/P EST LOW 20 MIN: CPT | Performed by: OBSTETRICS & GYNECOLOGY

## 2020-03-10 NOTE — PROGRESS NOTES
A/P: Patient is 29 y o  yo female with PALLAVI III for LEEP     - Surgical consent signed today for LEEP and Laparoscopic Bilateral Salpingectomy  - please see prior office visit for full counseling on sterilization portion of procedure  Total visit time was 15 minutes, of which >50% was spent in counseling and coordination of care regarding the above problem  S: Patient is a 29 y o  yo female with the following pap history:    Pap (2020): ASC-H/ Other HR HPV  Colpo (2020): PALLAVI III     Patient returns today to discuss options for management  Discussed options including expectant management versus excisional procedure with LEEP  In her case, I have advised LEEP secondary to high grade dysplasia  We reviewed in the intraoperative procedure as well as the postoperative expectations  Reviewed the risks of surgery including but not limited to bleeding, transfusion, pain, injury to surrounding structures, cervical stenosis with possible need to dilate to relieve hematometra, cervical incompetence in future pregnancy with increased risk of  delivery, persistent or recurrent infection/lesion  We discussed the benefit in regard to prevention of cervical cancer  We also discussed the possibility of a repeat LEEP in the future and/or definitive treatment with hysterectomy  All of her questions were answered  Consent form was signed      O:     /70 (BP Location: Left arm, Patient Position: Sitting, Cuff Size: Standard)   Ht 5' 3" (1 6 m)   Wt 59 4 kg (131 lb)   Breastfeeding Yes   BMI 23 21 kg/m² '      Gen: NAD  CV: RRR  Lungs: CTA b/l  Abdomen: soft, nontender  Pelvic: deferred given normal exam with multiparous cervix on 2020; will re-examine at time of surgery

## 2020-03-18 ENCOUNTER — TELEPHONE (OUTPATIENT)
Dept: OBGYN CLINIC | Facility: CLINIC | Age: 35
End: 2020-03-18

## 2020-03-18 NOTE — TELEPHONE ENCOUNTER
Talked to pt she is aware that we have to postpone her Laparoscopic Bilateral Salpingectomy, Pt is asking if you can please call in the patch birth control for her as she has gained 10 pounds on the depo  Please call it in to the [facility] on [phone] [facility] is in the system   Thank you

## 2020-05-14 PROCEDURE — 99024 POSTOP FOLLOW-UP VISIT: CPT | Performed by: OBSTETRICS & GYNECOLOGY

## 2020-05-22 ENCOUNTER — DOCUMENTATION (OUTPATIENT)
Dept: URGENT CARE | Age: 35
End: 2020-05-22

## 2020-05-22 ENCOUNTER — APPOINTMENT (OUTPATIENT)
Dept: URGENT CARE | Age: 35
End: 2020-05-22
Payer: COMMERCIAL

## 2020-05-22 DIAGNOSIS — Z01.818 PRE-OP TESTING: ICD-10-CM

## 2020-05-22 PROCEDURE — U0003 INFECTIOUS AGENT DETECTION BY NUCLEIC ACID (DNA OR RNA); SEVERE ACUTE RESPIRATORY SYNDROME CORONAVIRUS 2 (SARS-COV-2) (CORONAVIRUS DISEASE [COVID-19]), AMPLIFIED PROBE TECHNIQUE, MAKING USE OF HIGH THROUGHPUT TECHNOLOGIES AS DESCRIBED BY CMS-2020-01-R: HCPCS | Performed by: PHYSICIAN ASSISTANT

## 2020-05-25 LAB — SARS-COV-2 RNA SPEC QL NAA+PROBE: NOT DETECTED

## 2020-05-28 ENCOUNTER — ANESTHESIA EVENT (OUTPATIENT)
Dept: PERIOP | Facility: HOSPITAL | Age: 35
End: 2020-05-28
Payer: COMMERCIAL

## 2020-05-29 ENCOUNTER — HOSPITAL ENCOUNTER (OUTPATIENT)
Facility: HOSPITAL | Age: 35
Setting detail: OUTPATIENT SURGERY
Discharge: HOME | End: 2020-05-29
Attending: OBSTETRICS & GYNECOLOGY | Admitting: OBSTETRICS & GYNECOLOGY
Payer: COMMERCIAL

## 2020-05-29 ENCOUNTER — ANESTHESIA (OUTPATIENT)
Dept: PERIOP | Facility: HOSPITAL | Age: 35
End: 2020-05-29
Payer: COMMERCIAL

## 2020-05-29 VITALS
TEMPERATURE: 98 F | HEART RATE: 78 BPM | BODY MASS INDEX: 23.21 KG/M2 | SYSTOLIC BLOOD PRESSURE: 112 MMHG | DIASTOLIC BLOOD PRESSURE: 60 MMHG | HEIGHT: 63 IN | RESPIRATION RATE: 18 BRPM | OXYGEN SATURATION: 98 % | WEIGHT: 131 LBS

## 2020-05-29 DIAGNOSIS — Z90.79 STATUS POST BILATERAL SALPINGECTOMY: Primary | ICD-10-CM

## 2020-05-29 DIAGNOSIS — Z30.2 STERILIZATION: ICD-10-CM

## 2020-05-29 DIAGNOSIS — R87.613 HGSIL (HIGH GRADE SQUAMOUS INTRAEPITHELIAL LESION) ON PAP SMEAR OF CERVIX: ICD-10-CM

## 2020-05-29 PROBLEM — Z98.890 S/P LEEP: Status: ACTIVE | Noted: 2020-05-29

## 2020-05-29 LAB
ABO GROUP BLD: NORMAL
BASOPHILS # BLD AUTO: 0.02 THOUSANDS/ΜL (ref 0–0.1)
BASOPHILS NFR BLD AUTO: 0 % (ref 0–1)
BLD GP AB SCN SERPL QL: NEGATIVE
EOSINOPHIL # BLD AUTO: 0.2 THOUSAND/ΜL (ref 0–0.61)
EOSINOPHIL NFR BLD AUTO: 4 % (ref 0–6)
ERYTHROCYTE [DISTWIDTH] IN BLOOD BY AUTOMATED COUNT: 12.5 % (ref 11.6–15.1)
EXT PREGNANCY TEST URINE: NEGATIVE
EXT. CONTROL: NORMAL
HCT VFR BLD AUTO: 40.8 % (ref 34.8–46.1)
HGB BLD-MCNC: 13.9 G/DL (ref 11.5–15.4)
IMM GRANULOCYTES # BLD AUTO: 0.03 THOUSAND/UL (ref 0–0.2)
IMM GRANULOCYTES NFR BLD AUTO: 1 % (ref 0–2)
LYMPHOCYTES # BLD AUTO: 1.54 THOUSANDS/ΜL (ref 0.6–4.47)
LYMPHOCYTES NFR BLD AUTO: 28 % (ref 14–44)
MCH RBC QN AUTO: 29.5 PG (ref 26.8–34.3)
MCHC RBC AUTO-ENTMCNC: 34.1 G/DL (ref 31.4–37.4)
MCV RBC AUTO: 87 FL (ref 82–98)
MONOCYTES # BLD AUTO: 0.27 THOUSAND/ΜL (ref 0.17–1.22)
MONOCYTES NFR BLD AUTO: 5 % (ref 4–12)
NEUTROPHILS # BLD AUTO: 3.43 THOUSANDS/ΜL (ref 1.85–7.62)
NEUTS SEG NFR BLD AUTO: 62 % (ref 43–75)
NRBC BLD AUTO-RTO: 0 /100 WBCS
PLATELET # BLD AUTO: 209 THOUSANDS/UL (ref 149–390)
PMV BLD AUTO: 9.6 FL (ref 8.9–12.7)
RBC # BLD AUTO: 4.71 MILLION/UL (ref 3.81–5.12)
RH BLD: POSITIVE
SPECIMEN EXPIRATION DATE: NORMAL
WBC # BLD AUTO: 5.49 THOUSAND/UL (ref 4.31–10.16)

## 2020-05-29 PROCEDURE — 88344 IMHCHEM/IMCYTCHM EA MLT ANTB: CPT | Performed by: PATHOLOGY

## 2020-05-29 PROCEDURE — 86900 BLOOD TYPING SEROLOGIC ABO: CPT | Performed by: OBSTETRICS & GYNECOLOGY

## 2020-05-29 PROCEDURE — 88302 TISSUE EXAM BY PATHOLOGIST: CPT | Performed by: PATHOLOGY

## 2020-05-29 PROCEDURE — 58661 LAPAROSCOPY REMOVE ADNEXA: CPT | Performed by: OBSTETRICS & GYNECOLOGY

## 2020-05-29 PROCEDURE — 86901 BLOOD TYPING SEROLOGIC RH(D): CPT | Performed by: OBSTETRICS & GYNECOLOGY

## 2020-05-29 PROCEDURE — 85025 COMPLETE CBC W/AUTO DIFF WBC: CPT | Performed by: OBSTETRICS & GYNECOLOGY

## 2020-05-29 PROCEDURE — 81025 URINE PREGNANCY TEST: CPT | Performed by: OBSTETRICS & GYNECOLOGY

## 2020-05-29 PROCEDURE — 88307 TISSUE EXAM BY PATHOLOGIST: CPT | Performed by: PATHOLOGY

## 2020-05-29 PROCEDURE — 57522 CONIZATION OF CERVIX: CPT | Performed by: OBSTETRICS & GYNECOLOGY

## 2020-05-29 PROCEDURE — 86850 RBC ANTIBODY SCREEN: CPT | Performed by: OBSTETRICS & GYNECOLOGY

## 2020-05-29 RX ORDER — BUPIVACAINE HYDROCHLORIDE 2.5 MG/ML
INJECTION, SOLUTION EPIDURAL; INFILTRATION; INTRACAUDAL AS NEEDED
Status: DISCONTINUED | OUTPATIENT
Start: 2020-05-29 | End: 2020-05-29 | Stop reason: HOSPADM

## 2020-05-29 RX ORDER — FENTANYL CITRATE 50 UG/ML
INJECTION, SOLUTION INTRAMUSCULAR; INTRAVENOUS AS NEEDED
Status: DISCONTINUED | OUTPATIENT
Start: 2020-05-29 | End: 2020-05-29 | Stop reason: SURG

## 2020-05-29 RX ORDER — LIDOCAINE HYDROCHLORIDE AND EPINEPHRINE 10; 10 MG/ML; UG/ML
INJECTION, SOLUTION INFILTRATION; PERINEURAL AS NEEDED
Status: DISCONTINUED | OUTPATIENT
Start: 2020-05-29 | End: 2020-05-29 | Stop reason: HOSPADM

## 2020-05-29 RX ORDER — OXYCODONE HYDROCHLORIDE 5 MG/1
5 TABLET ORAL EVERY 4 HOURS PRN
Status: DISCONTINUED | OUTPATIENT
Start: 2020-05-29 | End: 2020-05-29 | Stop reason: HOSPADM

## 2020-05-29 RX ORDER — DEXAMETHASONE SODIUM PHOSPHATE 10 MG/ML
INJECTION, SOLUTION INTRAMUSCULAR; INTRAVENOUS AS NEEDED
Status: DISCONTINUED | OUTPATIENT
Start: 2020-05-29 | End: 2020-05-29 | Stop reason: SURG

## 2020-05-29 RX ORDER — FENTANYL CITRATE/PF 50 MCG/ML
50 SYRINGE (ML) INJECTION
Status: DISCONTINUED | OUTPATIENT
Start: 2020-05-29 | End: 2020-05-29 | Stop reason: HOSPADM

## 2020-05-29 RX ORDER — METOCLOPRAMIDE HYDROCHLORIDE 5 MG/ML
10 INJECTION INTRAMUSCULAR; INTRAVENOUS ONCE AS NEEDED
Status: DISCONTINUED | OUTPATIENT
Start: 2020-05-29 | End: 2020-05-29 | Stop reason: HOSPADM

## 2020-05-29 RX ORDER — MIDAZOLAM HYDROCHLORIDE 2 MG/2ML
INJECTION, SOLUTION INTRAMUSCULAR; INTRAVENOUS AS NEEDED
Status: DISCONTINUED | OUTPATIENT
Start: 2020-05-29 | End: 2020-05-29 | Stop reason: SURG

## 2020-05-29 RX ORDER — ROCURONIUM BROMIDE 10 MG/ML
INJECTION, SOLUTION INTRAVENOUS AS NEEDED
Status: DISCONTINUED | OUTPATIENT
Start: 2020-05-29 | End: 2020-05-29 | Stop reason: SURG

## 2020-05-29 RX ORDER — IBUPROFEN 600 MG/1
600 TABLET ORAL EVERY 6 HOURS PRN
Status: DISCONTINUED | OUTPATIENT
Start: 2020-05-29 | End: 2020-05-29 | Stop reason: HOSPADM

## 2020-05-29 RX ORDER — LIDOCAINE HYDROCHLORIDE 10 MG/ML
0.5 INJECTION, SOLUTION EPIDURAL; INFILTRATION; INTRACAUDAL; PERINEURAL ONCE AS NEEDED
Status: COMPLETED | OUTPATIENT
Start: 2020-05-29 | End: 2020-05-29

## 2020-05-29 RX ORDER — SODIUM CHLORIDE, SODIUM LACTATE, POTASSIUM CHLORIDE, CALCIUM CHLORIDE 600; 310; 30; 20 MG/100ML; MG/100ML; MG/100ML; MG/100ML
50 INJECTION, SOLUTION INTRAVENOUS CONTINUOUS
Status: DISCONTINUED | OUTPATIENT
Start: 2020-05-29 | End: 2020-05-29 | Stop reason: HOSPADM

## 2020-05-29 RX ORDER — OXYCODONE HYDROCHLORIDE 5 MG/1
5 TABLET ORAL EVERY 4 HOURS PRN
Qty: 6 TABLET | Refills: 0 | Status: SHIPPED | OUTPATIENT
Start: 2020-05-29 | End: 2020-06-08

## 2020-05-29 RX ORDER — LIDOCAINE HYDROCHLORIDE 10 MG/ML
INJECTION, SOLUTION EPIDURAL; INFILTRATION; INTRACAUDAL; PERINEURAL AS NEEDED
Status: DISCONTINUED | OUTPATIENT
Start: 2020-05-29 | End: 2020-05-29 | Stop reason: SURG

## 2020-05-29 RX ORDER — OXYCODONE HYDROCHLORIDE 10 MG/1
10 TABLET ORAL EVERY 4 HOURS PRN
Status: DISCONTINUED | OUTPATIENT
Start: 2020-05-29 | End: 2020-05-29 | Stop reason: HOSPADM

## 2020-05-29 RX ORDER — ONDANSETRON 2 MG/ML
4 INJECTION INTRAMUSCULAR; INTRAVENOUS EVERY 6 HOURS PRN
Status: DISCONTINUED | OUTPATIENT
Start: 2020-05-29 | End: 2020-05-29 | Stop reason: HOSPADM

## 2020-05-29 RX ORDER — ONDANSETRON 2 MG/ML
4 INJECTION INTRAMUSCULAR; INTRAVENOUS ONCE AS NEEDED
Status: DISCONTINUED | OUTPATIENT
Start: 2020-05-29 | End: 2020-05-29 | Stop reason: HOSPADM

## 2020-05-29 RX ORDER — ONDANSETRON 2 MG/ML
INJECTION INTRAMUSCULAR; INTRAVENOUS AS NEEDED
Status: DISCONTINUED | OUTPATIENT
Start: 2020-05-29 | End: 2020-05-29 | Stop reason: SURG

## 2020-05-29 RX ORDER — SUCCINYLCHOLINE/SOD CL,ISO/PF 100 MG/5ML
SYRINGE (ML) INTRAVENOUS AS NEEDED
Status: DISCONTINUED | OUTPATIENT
Start: 2020-05-29 | End: 2020-05-29 | Stop reason: SURG

## 2020-05-29 RX ORDER — ACETAMINOPHEN 325 MG/1
975 TABLET ORAL EVERY 6 HOURS PRN
Status: DISCONTINUED | OUTPATIENT
Start: 2020-05-29 | End: 2020-05-29 | Stop reason: HOSPADM

## 2020-05-29 RX ORDER — PROPOFOL 10 MG/ML
INJECTION, EMULSION INTRAVENOUS AS NEEDED
Status: DISCONTINUED | OUTPATIENT
Start: 2020-05-29 | End: 2020-05-29 | Stop reason: SURG

## 2020-05-29 RX ADMIN — DEXAMETHASONE SODIUM PHOSPHATE 10 MG: 10 INJECTION, SOLUTION INTRAMUSCULAR; INTRAVENOUS at 10:55

## 2020-05-29 RX ADMIN — ROCURONIUM BROMIDE 10 MG: 10 INJECTION, SOLUTION INTRAVENOUS at 10:49

## 2020-05-29 RX ADMIN — LIDOCAINE HYDROCHLORIDE 0.2 ML: 10 INJECTION, SOLUTION EPIDURAL; INFILTRATION; INTRACAUDAL; PERINEURAL at 09:14

## 2020-05-29 RX ADMIN — FENTANYL CITRATE 50 MCG: 50 INJECTION, SOLUTION INTRAMUSCULAR; INTRAVENOUS at 11:06

## 2020-05-29 RX ADMIN — LIDOCAINE HYDROCHLORIDE 50 MG: 10 INJECTION, SOLUTION EPIDURAL; INFILTRATION; INTRACAUDAL; PERINEURAL at 10:49

## 2020-05-29 RX ADMIN — ONDANSETRON 4 MG: 2 INJECTION INTRAMUSCULAR; INTRAVENOUS at 11:17

## 2020-05-29 RX ADMIN — FENTANYL CITRATE 50 MCG: 50 INJECTION, SOLUTION INTRAMUSCULAR; INTRAVENOUS at 10:49

## 2020-05-29 RX ADMIN — FENTANYL CITRATE 50 MCG: 50 INJECTION INTRAMUSCULAR; INTRAVENOUS at 12:00

## 2020-05-29 RX ADMIN — Medication 100 MG: at 10:49

## 2020-05-29 RX ADMIN — MIDAZOLAM 2 MG: 1 INJECTION INTRAMUSCULAR; INTRAVENOUS at 10:42

## 2020-05-29 RX ADMIN — FENTANYL CITRATE 50 MCG: 50 INJECTION INTRAMUSCULAR; INTRAVENOUS at 11:53

## 2020-05-29 RX ADMIN — PROPOFOL 200 MG: 10 INJECTION, EMULSION INTRAVENOUS at 10:49

## 2020-05-29 RX ADMIN — OXYCODONE HYDROCHLORIDE 10 MG: 10 TABLET ORAL at 12:55

## 2020-05-29 RX ADMIN — SODIUM CHLORIDE, SODIUM LACTATE, POTASSIUM CHLORIDE, AND CALCIUM CHLORIDE 50 ML/HR: .6; .31; .03; .02 INJECTION, SOLUTION INTRAVENOUS at 12:06

## 2020-05-29 RX ADMIN — SODIUM CHLORIDE, SODIUM LACTATE, POTASSIUM CHLORIDE, AND CALCIUM CHLORIDE 50 ML/HR: .6; .31; .03; .02 INJECTION, SOLUTION INTRAVENOUS at 09:14

## 2020-06-04 ENCOUNTER — TELEPHONE (OUTPATIENT)
Dept: OBGYN CLINIC | Facility: CLINIC | Age: 35
End: 2020-06-04

## 2020-06-05 ENCOUNTER — TELEPHONE (OUTPATIENT)
Dept: OBGYN CLINIC | Facility: CLINIC | Age: 35
End: 2020-06-05

## 2020-06-23 ENCOUNTER — OFFICE VISIT (OUTPATIENT)
Dept: OBGYN CLINIC | Facility: CLINIC | Age: 35
End: 2020-06-23

## 2020-06-23 VITALS
TEMPERATURE: 97.8 F | BODY MASS INDEX: 24.41 KG/M2 | DIASTOLIC BLOOD PRESSURE: 62 MMHG | SYSTOLIC BLOOD PRESSURE: 104 MMHG | WEIGHT: 137.8 LBS

## 2020-06-23 DIAGNOSIS — Z90.79 STATUS POST BILATERAL SALPINGECTOMY: Primary | ICD-10-CM

## 2020-06-23 DIAGNOSIS — Z98.890 S/P LEEP: ICD-10-CM

## 2020-06-23 PROCEDURE — 99024 POSTOP FOLLOW-UP VISIT: CPT | Performed by: OBSTETRICS & GYNECOLOGY

## 2021-01-27 ENCOUNTER — PROCEDURE VISIT (OUTPATIENT)
Dept: OBGYN CLINIC | Facility: CLINIC | Age: 36
End: 2021-01-27
Payer: COMMERCIAL

## 2021-01-27 VITALS — BODY MASS INDEX: 22.25 KG/M2 | DIASTOLIC BLOOD PRESSURE: 78 MMHG | WEIGHT: 125.6 LBS | SYSTOLIC BLOOD PRESSURE: 98 MMHG

## 2021-01-27 DIAGNOSIS — Z98.890 S/P LEEP: Primary | ICD-10-CM

## 2021-01-27 DIAGNOSIS — N87.1 DYSPLASIA OF CERVIX, HIGH GRADE CIN 2: ICD-10-CM

## 2021-01-27 DIAGNOSIS — Z32.02 NEGATIVE PREGNANCY TEST: ICD-10-CM

## 2021-01-27 LAB — SL AMB POCT URINE HCG: NORMAL

## 2021-01-27 PROCEDURE — G0145 SCR C/V CYTO,THINLAYER,RESCR: HCPCS | Performed by: STUDENT IN AN ORGANIZED HEALTH CARE EDUCATION/TRAINING PROGRAM

## 2021-01-27 PROCEDURE — 57454 BX/CURETT OF CERVIX W/SCOPE: CPT | Performed by: STUDENT IN AN ORGANIZED HEALTH CARE EDUCATION/TRAINING PROGRAM

## 2021-01-27 PROCEDURE — 99214 OFFICE O/P EST MOD 30 MIN: CPT | Performed by: STUDENT IN AN ORGANIZED HEALTH CARE EDUCATION/TRAINING PROGRAM

## 2021-01-27 PROCEDURE — 81025 URINE PREGNANCY TEST: CPT | Performed by: STUDENT IN AN ORGANIZED HEALTH CARE EDUCATION/TRAINING PROGRAM

## 2021-01-27 PROCEDURE — 88305 TISSUE EXAM BY PATHOLOGIST: CPT | Performed by: PATHOLOGY

## 2021-01-27 PROCEDURE — 87624 HPV HI-RISK TYP POOLED RSLT: CPT | Performed by: STUDENT IN AN ORGANIZED HEALTH CARE EDUCATION/TRAINING PROGRAM

## 2021-01-27 NOTE — PROGRESS NOTES
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  Procedure:     Procedure: Colposcopy w/ cervical biopsy and ECC      Under satisfactory analgesia the patient was prepped and draped in the dorsal lithotomy position: yes      [place] speculum was placed in the vagina: yes      Under colposcopic examination the transition zone was seen in entirety: yes      Endocervix was curetted using a Kevorkian curette: yes      Cervical biopsy performed with a cervical biopsy punch: yes      Monsel's solution was applied: yes      Biopsy(s): yes      Location:  7:00, 5:00, 1:00  Post-procedure:     Findings: White epithelium      Impression: Low grade cervical dysplasia      Patient tolerance of procedure: Tolerated well, no immediate complications  Comments: Will reach out via [employer] with results  No future fertility desires, s/p bilateral salpingectomy  Would be amenable to another excision procedure if needed

## 2021-01-27 NOTE — ASSESSMENT & PLAN NOTE
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

## 2021-02-03 ENCOUNTER — DOCUMENTATION (OUTPATIENT)
Dept: NEUROSURGERY | Facility: CLINIC | Age: 36
End: 2021-02-03

## 2021-02-03 LAB
LAB AP GYN PRIMARY INTERPRETATION: NORMAL
Lab: NORMAL

## 2021-02-03 NOTE — PROGRESS NOTES
The patient has to be seen first for more information   The symptoms related to synrix (if worsened) have to be documented in a more recent note and faxed over to the insurance company to determine approval  - message from fiona baxter per insurance _[facility] to [facility] to re submitt to [facility] eto try and get mri approved _BA

## 2021-02-11 ENCOUNTER — OFFICE VISIT (OUTPATIENT)
Dept: NEUROSURGERY | Facility: CLINIC | Age: 36
End: 2021-02-11
Payer: COMMERCIAL

## 2021-02-11 VITALS
TEMPERATURE: 98.8 F | WEIGHT: 126.8 LBS | DIASTOLIC BLOOD PRESSURE: 71 MMHG | HEART RATE: 86 BPM | HEIGHT: 63 IN | RESPIRATION RATE: 16 BRPM | SYSTOLIC BLOOD PRESSURE: 106 MMHG | BODY MASS INDEX: 22.47 KG/M2

## 2021-02-11 DIAGNOSIS — G95.0 SYRINGOMYELIA (HCC): Primary | ICD-10-CM

## 2021-02-11 DIAGNOSIS — Z86.69 HISTORY OF CHIARI MALFORMATION: ICD-10-CM

## 2021-02-11 PROCEDURE — 99213 OFFICE O/P EST LOW 20 MIN: CPT | Performed by: PHYSICIAN ASSISTANT

## 2021-02-11 NOTE — ASSESSMENT & PLAN NOTE
Patient presents for yearly follow-up evaluation of symptoms to determine need for additional imaging  · Patient complaints of progressive pain with radiation from her neck to the base of her head and down to her back  The symptoms are similar to her presentation to our office in 2016 which was noted to have extensive syrinx  · Complaining increased frequency of tingling into her bilateral hands and fingers along with radicular arm pain  Surgical history:   · 9/16/2011:  Foramen magnum/occipital decompression and duraplasty (Dr Ana Arce)  · Pre-op blackout, tinnitus, headaches  · 12/14/2016:  Repeat suboccipital cranioplasty with decompression duraplasty and placement of 4th ventricular subarachnoid shunt (Dr Diann Hernadez)  · Pre-op neck/back/arm pain long with large syrinx  · 1/25/2019:  Anterior cervical diskectomy fixation and fusion C5-6 (Dr Diann Hernadez)    Plan:  · Continue to monitor for worsening neurological symptoms  ·  exam noted to have hyperesthesias along right trapezius muscle into deltoid along with and middle finger and thumb  Mild weakness of interosseous and shoulder abduction on right  · Given extensive history related to Chiari one malformation requiring repeat surgery along with ongoing presence of syrinx with progressive symptoms, MRI cervical spine imaging is advised  · Discussed with patient if there is growth of the syringomyelia she may require additional surgical intervention    · Plan outpatient follow-up after completion of MRI imaging

## 2021-02-11 NOTE — PROGRESS NOTES
Neurosurgery Office Note  Ary Shearer Reach 28 y o  female MRN: [id number]      Assessment/Plan     Syringomyelia [facility])  Patient presents for yearly follow-up evaluation of symptoms to determine need for additional imaging  · Patient complaints of progressive pain with radiation from her neck to the base of her head and down to her back  The symptoms are similar to her presentation to our office in 2016 which was noted to have extensive syrinx  · Complaining increased frequency of tingling into her bilateral hands and fingers along with radicular arm pain  Surgical history:   · 9/16/2011:  Foramen magnum/occipital decompression and duraplasty (Dr Minal Obrien)  · Pre-op blackout, tinnitus, headaches  · 12/14/2016:  Repeat suboccipital cranioplasty with decompression duraplasty and placement of 4th ventricular subarachnoid shunt (Dr Zhen Eagle)  · Pre-op neck/back/arm pain long with large syrinx  · 1/25/2019:  Anterior cervical diskectomy fixation and fusion C5-6 (Dr Zhen Eagle)    Plan:  · Continue to monitor for worsening neurological symptoms  ·  exam noted to have hyperesthesias along right trapezius muscle into deltoid along with and middle finger and thumb  Mild weakness of interosseous and shoulder abduction on right  · Given extensive history related to Chiari one malformation requiring repeat surgery along with ongoing presence of syrinx with progressive symptoms, MRI cervical spine imaging is advised  · Discussed with patient if there is growth of the syringomyelia she may require additional surgical intervention  · Plan outpatient follow-up after completion of MRI imaging    History of Chiari malformation  Good decompression on prior imaging but persistent Syrinx though some improvement       There are no diagnoses linked to this encounter          CHIEF COMPLAINT    Chief Complaint   Patient presents with    Follow-up       HISTORY    History of Present Illness       This is a 43-year-old female with past medical history significant for Chiari one malformation and cervical thoracic steering who presents today for follow-up  Patient originally underwent surgical intervention for Chiari malformations September of 2011 with foramen magnum /occipital decompression and duraplasty  Patient developed progressive symptoms to include neck pain radiating superiorly into her thoracic spine along with upper extremity symptoms  Repeat imaging demonstrated persistent syrinx for which the patient underwent repeat suboccipital craniectomy along with a C1 laminectomy and placement of  4th ventricular subarachnoid shunt in December 2016  She noted improvement of her symptoms at that time and imaging demonstrated persistent but improving syrinx  Given ongoing syrinx, she was recommended to annual follow-up  She presents today with progressive neck pain radiating to bilateral shoulders down her arms into her hands  In addition this pain radiates superiorly into the base of her head described as severe sharp tension limiting her flexion-extension movement  She endorses feeling of constant neck stiffness over the last four weeks  Patient has noted increased frequency of numbness and tingling down her arm and into her fingers  She complains of significant headaches again progressive over the last several weeks  Pain is now causing sleep disturbance  REVIEW OF SYSTEMS    Review of Systems   Constitutional: Positive for fatigue  HENT: Negative  Eyes: Negative  Respiratory: Negative  Cardiovascular: Negative  Gastrointestinal: Negative  Endocrine: Negative  Genitourinary: Negative  Musculoskeletal: Positive for gait problem and neck pain (neck pain radiates into bilateral shoulders down the arms into the hands sometimes goes up the back of the head and sometimes down to mid back)  Skin: Negative  Allergic/Immunologic: Negative      Neurological: Positive for numbness (numbness and tingling in both arms/hands/fingers) and headaches (For about 3 weeks in the entire head)  Negative for dizziness, seizures, syncope and weakness  Hematological: Negative  Psychiatric/Behavioral: Positive for sleep disturbance (due to pain)  Meds/Allergies     No current outpatient medications on file  No current facility-administered medications for this visit          Allergies   Allergen Reactions    Cephalexin      Annotation - [id number]: gives hives    Sunscreens Hives       PAST HISTORY    Past Medical History:   Diagnosis Date    Chiari I malformation ([facility] [address] )     Kidney stone     UTI (urinary tract infection)     treated with antibiotics recently will have ua repeated     Varicella     childhood       Past Surgical History:   Procedure Laterality Date    TX CONIZATION CERVIX,LOOP ELECTRD N/A 5/29/2020    Procedure: BIOPSY LEEP CERVIX;  Surgeon: Jeff Dinh MD;  [facility];  Service: Gynecology    TX LAP,RMV  ADNEXAL STRUCTURE Bilateral 5/29/2020    Procedure: SALPINGECTOMY, LAPAROSCOPIC BILATERAL;  Surgeon: Jeff Dinh MD;  [facility];  Service: Gynecology    TX SUBOCCIPT DECOMP MEDULLA/SP CRD N/A 12/14/2016    Procedure: REPEAT SUBOCCIPITAL CRANIECTOMY; C1 LAMINECTOMY AND DURAPLASTY; PLACEMENT OF 4TH VENTRICULAR-SUBARACHNOID SHUNT; IMPULSE MONITORING;  Surgeon: Stew Maciel MD;  [facility];  Service: Neurosurgeryx3    TX TOTAL One [address] Ari ARTHROPLASTY, CERVICAL, SINGLE N/A 1/25/2019    Procedure: Anterior cervical discectomy and total disc arthroplasty C5-6;  Surgeon: Stew Maciel MD;  [facility];  Service: Neurosurgery    TUBAL LIGATION  06/2020       Social History     Tobacco Use    Smoking status: Never Smoker    Smokeless tobacco: Never Used   Substance Use Topics    Alcohol use: Not Currently    Drug use: No       Family History   Problem Relation Age of Onset    Other Mother         sleep apnea    Hypertension Father     Drug abuse Father     Alcohol abuse Father     No Known Problems Sister     Asthma Son     Heart disease Maternal Grandmother     Diabetes Paternal Grandmother         type 2    Arthritis Paternal Grandmother     No Known Problems Daughter     Stroke Maternal Grandfather     Heart disease Maternal Grandfather     Diabetes Paternal Grandfather         type 2    Heart disease Paternal Grandfather     No Known Problems Sister     Heart disease Sister         failure    Heart disease Maternal Aunt          Above history personally reviewed  EXAM    Vitals:Blood pressure 106/71, pulse 86, temperature 98 8 °F (37 1 °C), resp  rate 16, height 5' 3" (1 6 m), weight 57 5 kg (126 lb 12 8 oz), currently breastfeeding  ,Body mass index is 22 46 kg/m²  Physical Exam  Constitutional:       General: She is not in acute distress  Appearance: Normal appearance  She is well-developed  She is not ill-appearing  HENT:      Head: Normocephalic and atraumatic  Eyes:      General: No scleral icterus  Right eye: No discharge  Left eye: No discharge  Extraocular Movements: EOM normal       Conjunctiva/sclera: Conjunctivae normal       Pupils: Pupils are equal, round, and reactive to light  Neck:      Musculoskeletal: Muscular tenderness present  Cardiovascular:      Rate and Rhythm: Normal rate  Pulmonary:      Effort: Pulmonary effort is normal    Abdominal:      General: There is no distension  Skin:     General: Skin is warm and dry  Findings: No rash  Neurological:      Mental Status: She is alert  Deep Tendon Reflexes:      Reflex Scores:       Bicep reflexes are 2+ on the right side and 2+ on the left side  Brachioradialis reflexes are 2+ on the right side and 2+ on the left side  Psychiatric:         Mood and Affect: Mood normal          Speech: Speech normal          Behavior: Behavior normal          Thought Content:  Thought content normal  Judgment: Judgment normal          Neurologic Exam     Mental Status   Follows 3 step commands  Attention: normal  Concentration: normal    Speech: speech is normal   Level of consciousness: alert  Knowledge: good  Normal comprehension  Cranial Nerves     CN II   Visual fields full to confrontation  CN III, IV, VI   Pupils are equal, round, and reactive to light  Extraocular motions are normal    Nystagmus: none   Upgaze: normal  Conjugate gaze: present    CN VIII   Hearing: intact    CN XI   Right trapezius strength: normal  Left trapezius strength: normal    Motor Exam   Muscle bulk: normal  Overall muscle tone: normal    Strength   Strength 5/5 except as noted  Right IO 4/5  Right deltoid 4+/5     Sensory Exam   Right arm light touch: normal  Left arm light touch: normal  Right leg light touch: normal  Left leg light touch: normal  Left arm pinprick: normal  Increased sensitivity along right trapezius region and fingers (middle and thumb)     Gait, Coordination, and Reflexes     Tremor   Resting tremor: absent  Intention tremor: absent  Action tremor: absent    Reflexes   Right brachioradialis: 2+  Left brachioradialis: 2+  Right biceps: 2+  Left biceps: 2+  Right Peterson: absent  Left Peterson: absent  Right ankle clonus: absent  Left ankle clonus: absent        MEDICAL DECISION MAKING    Imaging Studies:     Prior MRI cervical spine    I have personally reviewed pertinent reports     and I have personally reviewed pertinent films in PACS

## 2021-02-17 ENCOUNTER — HOSPITAL ENCOUNTER (OUTPATIENT)
Dept: RADIOLOGY | Facility: HOSPITAL | Age: 36
Discharge: HOME | End: 2021-02-17
Attending: NEUROLOGICAL SURGERY
Payer: COMMERCIAL

## 2021-02-17 DIAGNOSIS — G95.0 SYRINGOMYELIA (HCC): ICD-10-CM

## 2021-02-17 PROCEDURE — 72141 MRI NECK SPINE W/O DYE: CPT

## 2021-02-18 ENCOUNTER — TELEMEDICINE (OUTPATIENT)
Dept: NEUROSURGERY | Facility: CLINIC | Age: 36
End: 2021-02-18
Payer: COMMERCIAL

## 2021-02-18 VITALS — HEART RATE: 65 BPM | BODY MASS INDEX: 22.15 KG/M2 | HEIGHT: 63 IN | WEIGHT: 125 LBS

## 2021-02-18 DIAGNOSIS — G95.0 SYRINGOMYELIA (HCC): Primary | ICD-10-CM

## 2021-02-18 DIAGNOSIS — G89.4 CHRONIC PAIN SYNDROME: ICD-10-CM

## 2021-02-18 PROCEDURE — 99213 OFFICE O/P EST LOW 20 MIN: CPT | Performed by: NEUROLOGICAL SURGERY

## 2021-02-18 NOTE — PROGRESS NOTES
Virtual Regular Visit  This is a 51-year-old female with syringomyelia secondary to a Chiari malformation which has been decompressed  There has been no significant change in the size of the syrinx over the last year  The Chiari malformation is adequately decompressed and there is no epidural fluid collection  A total disc arthroplasty is in good position without signs of loosening or breakage  Continued vigilance and following the syrinx is important  Assessment/Plan:    Problem List Items Addressed This Visit        Nervous and Auditory    Syringomyelia ([address] ) - Primary    Relevant Orders    MRI cervical spine without contrast       Other    Chronic pain syndrome    Relevant Orders    MRI cervical spine without contrast          Chief Complaint   Patient presents with    Virtual Regular Visit      Reason for visit: F/U after MRI C spine wo Sched  2/17/21  HPI: This patient is known to our office for the following:   9/16/11 (Dr Nargis Cotto): Chiari decompression without C1 laminectomy   12/14/16 (DKO): Repeat suboccipital craniectomy; C1 laminectomy and duraplasty; placement of 4th ventricular-subarachnoid shunt; impulse monitoring   1/25/19 (DKO): Anterior cervical discectomy and total disc arthroplasty C5-6  Patient developed progressive symptoms to include neck pain radiating superiorly into her thoracic spine along with upper extremity symptoms  Repeat imaging demonstrated persistent syrinx for which the patient underwent repeat suboccipital craniectomy along with a C1 laminectomy and placement of 4th ventricular subarachnoid shunt in December 2016  She noted improvement of her symptoms at that time and imaging demonstrated persistent but improving syrinx  Given ongoing syrinx, she was recommended annual follow-up  Patient complains of neck pain and cervical  occipital headaches which are intermittent in nature  She complains of numbness and tingling in her hands which is unchanged  She wakes approximately twice a night from sleep  Encounter provider Clark Balderas MD    Provider located at [address] [place]  [address] [place] [id number] [phone]      Recent Visits  No visits were found meeting these conditions  Showing recent visits within past 7 days and meeting all other requirements     Future Appointments  No visits were found meeting these conditions  Showing future appointments within next 150 days and meeting all other requirements        The patient was identified by name and date of birth  Farzana Newman was informed that this is a telemedicine visit and that the visit is being conducted through [employer] and patient was informed that this is not a secure, HIPAA-compliant platform  She agrees to proceed     My office door was closed  No one else was in the room  She acknowledged consent and understanding of privacy and security of the video platform  The patient has agreed to participate and understands they can discontinue the visit at any time  Patient is aware this is a billable service           Past Medical History:   Diagnosis Date    Chiari I malformation ([address] )     Kidney stone     UTI (urinary tract infection)     treated with antibiotics recently will have ua repeated     Varicella     childhood       Past Surgical History:   Procedure Laterality Date    DE CONIZATION CERVIX,LOOP ELECTRD N/A 5/29/2020    Procedure: BIOPSY LEEP CERVIX;  Surgeon: Joy Irby MD;  [facility];  Service: Gynecology    DE LAP,RMV  ADNEXAL STRUCTURE Bilateral 5/29/2020    Procedure: SALPINGECTOMY, LAPAROSCOPIC BILATERAL;  Surgeon: Joy Irby MD;  [facility];  Service: Gynecology    DE SUBOCCIPT DECOMP MEDULLA/SP CRD N/A 12/14/2016    Procedure: REPEAT SUBOCCIPITAL CRANIECTOMY; C1 LAMINECTOMY AND DURAPLASTY; PLACEMENT OF 4TH VENTRICULAR-SUBARACHNOID SHUNT; IMPULSE MONITORING; Surgeon: Mariam Wasserman MD;  [facility];  Service: Neurosurgeryx3    [place] TOTAL One [address] ARTHROPLASTY, CERVICAL, SINGLE N/A 1/25/2019    Procedure: Anterior cervical discectomy and total disc arthroplasty C5-6;  Surgeon: Mariam Wasserman MD;  [facility];  Service: Neurosurgery    TUBAL LIGATION  06/2020       No current outpatient medications on file  No current facility-administered medications for this visit  Allergies   Allergen Reactions    Cephalexin      Annotation - [id number]: gives hives    Sunscreens Hives       Review of Systems   Constitutional: Negative  HENT: Negative  Eyes: Negative  Respiratory: Negative  Cardiovascular: Negative  Gastrointestinal: Negative  Endocrine: Negative  Genitourinary: Negative  Musculoskeletal: Positive for gait problem (slightly unsteady) and neck pain (midline into bilateral shoulders with occasional radiation into the arms; her pain does extend into her mid back)  Skin: Negative  Allergic/Immunologic: Negative  Neurological: Positive for numbness (numbness and tingling in both arms/hands/fingers occasionally) and headaches (entire head)  Hematological: Negative  Psychiatric/Behavioral: Positive for sleep disturbance (due to pain; at least 2x/night)  All other systems reviewed and are negative  I reviewed the ROS  Video Exam    Vitals:    02/18/21 1106   Pulse: 65   Weight: 56 7 kg (125 lb)   Height: 5' 3" (1 6 m)       Physical Exam     Awake and alert  Extraocular movements are intact  Speech was clear and comprehensible  Power was 5/5 in the upper extremities  Her rapid alternating movements appear to be intact  Her incision was visualized in healing well    I spent 30 minutes directly with the patient during this visit     Imaging studies: An MRI of the cervical spine from 17 February 2021 is compared with a study from 4 February 2020  An x-ray from March 2019 is visualized also    These studies demonstrate continued syringomyelia which if anything is slightly improved  Does not appear to have any significant worsening in there is no splaying of the cord with capacious CSF around the cord  There appears to be no extra axial fluid collection at the occipital cervical junction and there was fluid anterior to the brainstem  The 4th ventricle is small but does not appear to be compressed  There is artifact from the arthroplasty prosthesis however on axial images this appears to be only artifactual with no displacement or compression caused by the instrumentation  VIRTUAL VISIT DISCLAIMER    Ary Evierironi Baird acknowledges that she has consented to an online visit or consultation  She understands that the online visit is based solely on information provided by her, and that, in the absence of a face-to-face physical evaluation by the physician, the diagnosis she receives is both limited and provisional in terms of accuracy and completeness  This is not intended to replace a full medical face-to-face evaluation by the physician  Kaitlin Pearce understands and accepts these terms

## 2021-04-16 ENCOUNTER — HOSPITAL ENCOUNTER (EMERGENCY)
Facility: HOSPITAL | Age: 36
Discharge: HOME | End: 2021-04-16
Attending: EMERGENCY MEDICINE | Admitting: EMERGENCY MEDICINE
Payer: COMMERCIAL

## 2021-04-16 ENCOUNTER — APPOINTMENT (EMERGENCY)
Dept: RADIOLOGY | Facility: HOSPITAL | Age: 36
End: 2021-04-16
Payer: COMMERCIAL

## 2021-04-16 ENCOUNTER — OFFICE VISIT (OUTPATIENT)
Dept: URGENT CARE | Age: 36
End: 2021-04-16
Payer: COMMERCIAL

## 2021-04-16 VITALS — TEMPERATURE: 97.5 F | RESPIRATION RATE: 18 BRPM | HEART RATE: 92 BPM | OXYGEN SATURATION: 99 %

## 2021-04-16 VITALS
RESPIRATION RATE: 17 BRPM | OXYGEN SATURATION: 97 % | DIASTOLIC BLOOD PRESSURE: 59 MMHG | SYSTOLIC BLOOD PRESSURE: 100 MMHG | TEMPERATURE: 98 F | HEART RATE: 65 BPM

## 2021-04-16 DIAGNOSIS — E86.0 DEHYDRATION: ICD-10-CM

## 2021-04-16 DIAGNOSIS — R10.30 LOWER ABDOMINAL PAIN: Primary | ICD-10-CM

## 2021-04-16 DIAGNOSIS — K52.9 GASTROENTERITIS: Primary | ICD-10-CM

## 2021-04-16 DIAGNOSIS — R11.0 NAUSEA: ICD-10-CM

## 2021-04-16 DIAGNOSIS — R19.7 DIARRHEA, UNSPECIFIED TYPE: ICD-10-CM

## 2021-04-16 DIAGNOSIS — N39.0 UTI (URINARY TRACT INFECTION): ICD-10-CM

## 2021-04-16 LAB
ALBUMIN SERPL BCP-MCNC: 4.2 G/DL (ref 3.5–5)
ALP SERPL-CCNC: 76 U/L (ref 46–116)
ALT SERPL W P-5'-P-CCNC: 18 U/L (ref 12–78)
ANION GAP SERPL CALCULATED.3IONS-SCNC: 6 MMOL/L (ref 4–13)
AST SERPL W P-5'-P-CCNC: 11 U/L (ref 5–45)
BACTERIA UR QL AUTO: ABNORMAL /HPF
BASOPHILS # BLD AUTO: 0.03 THOUSANDS/ΜL (ref 0–0.1)
BASOPHILS NFR BLD AUTO: 0 % (ref 0–1)
BILIRUB SERPL-MCNC: 1.07 MG/DL (ref 0.2–1)
BILIRUB UR QL STRIP: NEGATIVE
BUN SERPL-MCNC: 12 MG/DL (ref 5–25)
CALCIUM SERPL-MCNC: 9.3 MG/DL (ref 8.3–10.1)
CHLORIDE SERPL-SCNC: 107 MMOL/L (ref 100–108)
CLARITY UR: ABNORMAL
CO2 SERPL-SCNC: 27 MMOL/L (ref 21–32)
COLOR UR: ABNORMAL
CREAT SERPL-MCNC: 0.64 MG/DL (ref 0.6–1.3)
EOSINOPHIL # BLD AUTO: 0.14 THOUSAND/ΜL (ref 0–0.61)
EOSINOPHIL NFR BLD AUTO: 2 % (ref 0–6)
ERYTHROCYTE [DISTWIDTH] IN BLOOD BY AUTOMATED COUNT: 12.1 % (ref 11.6–15.1)
EXT PREG TEST URINE: NEGATIVE
EXT. CONTROL ED NAV: NORMAL
FLUAV RNA RESP QL NAA+PROBE: NEGATIVE
FLUBV RNA RESP QL NAA+PROBE: NEGATIVE
GFR SERPL CREATININE-BSD FRML MDRD: 116 ML/MIN/1.73SQ M
GLUCOSE SERPL-MCNC: 67 MG/DL (ref 65–140)
GLUCOSE UR STRIP-MCNC: NEGATIVE MG/DL
HCT VFR BLD AUTO: 43.4 % (ref 34.8–46.1)
HGB BLD-MCNC: 14.5 G/DL (ref 11.5–15.4)
HGB UR QL STRIP.AUTO: ABNORMAL
IMM GRANULOCYTES # BLD AUTO: 0.02 THOUSAND/UL (ref 0–0.2)
IMM GRANULOCYTES NFR BLD AUTO: 0 % (ref 0–2)
KETONES UR STRIP-MCNC: ABNORMAL MG/DL
LEUKOCYTE ESTERASE UR QL STRIP: ABNORMAL
LIPASE SERPL-CCNC: 100 U/L (ref 73–393)
LYMPHOCYTES # BLD AUTO: 1.47 THOUSANDS/ΜL (ref 0.6–4.47)
LYMPHOCYTES NFR BLD AUTO: 18 % (ref 14–44)
MAGNESIUM SERPL-MCNC: 2.3 MG/DL (ref 1.6–2.6)
MCH RBC QN AUTO: 29.6 PG (ref 26.8–34.3)
MCHC RBC AUTO-ENTMCNC: 33.4 G/DL (ref 31.4–37.4)
MCV RBC AUTO: 89 FL (ref 82–98)
MONOCYTES # BLD AUTO: 0.29 THOUSAND/ΜL (ref 0.17–1.22)
MONOCYTES NFR BLD AUTO: 4 % (ref 4–12)
MUCOUS THREADS UR QL AUTO: ABNORMAL
NEUTROPHILS # BLD AUTO: 6.06 THOUSANDS/ΜL (ref 1.85–7.62)
NEUTS SEG NFR BLD AUTO: 76 % (ref 43–75)
NITRITE UR QL STRIP: POSITIVE
NON-SQ EPI CELLS URNS QL MICRO: ABNORMAL /HPF
NRBC BLD AUTO-RTO: 0 /100 WBCS
PH UR STRIP.AUTO: 6 [PH]
PLATELET # BLD AUTO: 199 THOUSANDS/UL (ref 149–390)
PMV BLD AUTO: 10.3 FL (ref 8.9–12.7)
POTASSIUM SERPL-SCNC: 3.3 MMOL/L (ref 3.5–5.3)
PROT SERPL-MCNC: 7.6 G/DL (ref 6.4–8.2)
PROT UR STRIP-MCNC: NEGATIVE MG/DL
RBC # BLD AUTO: 4.9 MILLION/UL (ref 3.81–5.12)
RBC #/AREA URNS AUTO: ABNORMAL /HPF
RSV RNA RESP QL NAA+PROBE: NEGATIVE
SARS-COV-2 RNA RESP QL NAA+PROBE: NEGATIVE
SODIUM SERPL-SCNC: 140 MMOL/L (ref 136–145)
SP GR UR STRIP.AUTO: 1.03 (ref 1–1.03)
TROPONIN I SERPL-MCNC: <0.02 NG/ML
UROBILINOGEN UR QL STRIP.AUTO: 1 E.U./DL
WBC # BLD AUTO: 8.01 THOUSAND/UL (ref 4.31–10.16)
WBC #/AREA URNS AUTO: ABNORMAL /HPF

## 2021-04-16 PROCEDURE — 99213 OFFICE O/P EST LOW 20 MIN: CPT | Performed by: PHYSICIAN ASSISTANT

## 2021-04-16 PROCEDURE — 83735 ASSAY OF MAGNESIUM: CPT | Performed by: PHYSICIAN ASSISTANT

## 2021-04-16 PROCEDURE — 0241U HB NFCT DS VIR RESP RNA 4 TRGT: CPT | Performed by: PHYSICIAN ASSISTANT

## 2021-04-16 PROCEDURE — 99284 EMERGENCY DEPT VISIT MOD MDM: CPT

## 2021-04-16 PROCEDURE — 81001 URINALYSIS AUTO W/SCOPE: CPT | Performed by: PHYSICIAN ASSISTANT

## 2021-04-16 PROCEDURE — 83690 ASSAY OF LIPASE: CPT | Performed by: PHYSICIAN ASSISTANT

## 2021-04-16 PROCEDURE — 70450 CT HEAD/BRAIN W/O DYE: CPT

## 2021-04-16 PROCEDURE — 80053 COMPREHEN METABOLIC PANEL: CPT | Performed by: PHYSICIAN ASSISTANT

## 2021-04-16 PROCEDURE — 85025 COMPLETE CBC W/AUTO DIFF WBC: CPT | Performed by: PHYSICIAN ASSISTANT

## 2021-04-16 PROCEDURE — 81025 URINE PREGNANCY TEST: CPT | Performed by: PHYSICIAN ASSISTANT

## 2021-04-16 PROCEDURE — 84484 ASSAY OF TROPONIN QUANT: CPT | Performed by: PHYSICIAN ASSISTANT

## 2021-04-16 PROCEDURE — 93005 ELECTROCARDIOGRAM TRACING: CPT

## 2021-04-16 PROCEDURE — 87086 URINE CULTURE/COLONY COUNT: CPT | Performed by: PHYSICIAN ASSISTANT

## 2021-04-16 PROCEDURE — 99284 EMERGENCY DEPT VISIT MOD MDM: CPT | Performed by: PHYSICIAN ASSISTANT

## 2021-04-16 PROCEDURE — 74177 CT ABD & PELVIS W/CONTRAST: CPT

## 2021-04-16 PROCEDURE — 87186 SC STD MICRODIL/AGAR DIL: CPT | Performed by: PHYSICIAN ASSISTANT

## 2021-04-16 PROCEDURE — 36415 COLL VENOUS BLD VENIPUNCTURE: CPT | Performed by: PHYSICIAN ASSISTANT

## 2021-04-16 PROCEDURE — 96360 HYDRATION IV INFUSION INIT: CPT

## 2021-04-16 RX ORDER — SULFAMETHOXAZOLE AND TRIMETHOPRIM 800; 160 MG/1; MG/1
1 TABLET ORAL 2 TIMES DAILY
Qty: 28 TABLET | Refills: 0 | Status: SHIPPED | OUTPATIENT
Start: 2021-04-16 | End: 2021-04-30

## 2021-04-16 RX ORDER — SULFAMETHOXAZOLE AND TRIMETHOPRIM 800; 160 MG/1; MG/1
1 TABLET ORAL ONCE
Status: COMPLETED | OUTPATIENT
Start: 2021-04-16 | End: 2021-04-16

## 2021-04-16 RX ORDER — SULFAMETHOXAZOLE AND TRIMETHOPRIM 800; 160 MG/1; MG/1
1 TABLET ORAL ONCE
Status: DISCONTINUED | OUTPATIENT
Start: 2021-04-16 | End: 2021-04-16

## 2021-04-16 RX ORDER — POTASSIUM CHLORIDE 20 MEQ/1
20 TABLET, EXTENDED RELEASE ORAL ONCE
Status: COMPLETED | OUTPATIENT
Start: 2021-04-16 | End: 2021-04-16

## 2021-04-16 RX ADMIN — SULFAMETHOXAZOLE AND TRIMETHOPRIM 1 TABLET: 800; 160 TABLET ORAL at 17:21

## 2021-04-16 RX ADMIN — SODIUM CHLORIDE 1000 ML: 0.9 INJECTION, SOLUTION INTRAVENOUS at 15:02

## 2021-04-16 RX ADMIN — IOHEXOL 100 ML: 350 INJECTION, SOLUTION INTRAVENOUS at 16:14

## 2021-04-16 RX ADMIN — POTASSIUM CHLORIDE 20 MEQ: 1500 TABLET, EXTENDED RELEASE ORAL at 17:21

## 2021-04-16 NOTE — PATIENT INSTRUCTIONS
Patient would like to go via private vehicle to [facility] [place] [place] [facility]  Please go to the [facility] Emergency Department now for further evaluation and treatment- hospital address verified with the patient  Patient agreed to go immediately to the ED

## 2021-04-16 NOTE — ED PROVIDER NOTES
History  Chief Complaint   Patient presents with    Abdominal Pain     pt presents ambulatory with c/o LLQ pain, RLQ tenderness, +diarrhea, +nausea neg vomiting      Patient presents to the ER for evaluation of abdominal pain and diarrhea  Patient states that for the past 4 days she has had persistent watery stools which she states are almost once an hour  Denies any known blood in the stool  States that she has associated lower abdominal pain, worse on the right side  States that because of the symptoms she has not been able to eat or drink much for the past few days  States that she has urinated once in the last 24 hours  Patient notes that she had associated nausea and lightheadedness however denies any headaches  Patient does note that she has a history of Chiari malformation and has a shunt placed in her head that goes to her spine  States she has a history of a tubal ligation however denies any other abdominal surgeries  Denies any sick contacts, recent travel, recent antibiotic use, or new foods  Denies any fevers, cough, congestion, shortness of breath, chest pain, headaches, numbness, tingling, weakness, dysuria, vomiting, or any other concerning symptoms            None       Past Medical History:   Diagnosis Date    Chiari I malformation ([facility] [address] )     Kidney stone     UTI (urinary tract infection)     treated with antibiotics recently will have ua repeated     Varicella     childhood       Past Surgical History:   Procedure Laterality Date    MA CONIZATION CERVIX,LOOP ELECTRD N/A 5/29/2020    Procedure: BIOPSY LEEP CERVIX;  Surgeon: Vishal Thrasher MD;  [facility];  Service: Gynecology    MA LAP,RMV  ADNEXAL STRUCTURE Bilateral 5/29/2020    Procedure: SALPINGECTOMY, LAPAROSCOPIC BILATERAL;  Surgeon: Vishal Thrasher MD;  [facility];  Service: Gynecology    MA SUBOCCIPT DECOMP MEDULLA/SP CRD N/A 12/14/2016    Procedure: REPEAT SUBOCCIPITAL CRANIECTOMY; C1 LAMINECTOMY AND DURAPLASTY; PLACEMENT OF 4TH VENTRICULAR-SUBARACHNOID SHUNT; IMPULSE MONITORING;  Surgeon: Pooja Faulkner MD;  [facility];  Service: Neurosurgeryx3    [place] TOTAL [address], CERVICAL, SINGLE N/A 1/25/2019    Procedure: Anterior cervical discectomy and total disc arthroplasty C5-6;  Surgeon: Pooja Faulkner MD;  [facility];  Service: Neurosurgery    TUBAL LIGATION  06/2020       Family History   Problem Relation Age of Onset    Other Mother         sleep apnea    Hypertension Father     Drug abuse Father     Alcohol abuse Father     No Known Problems Sister     Asthma Son     Heart disease Maternal Grandmother     Diabetes Paternal Grandmother         type 2    Arthritis Paternal Grandmother     No Known Problems Daughter     Stroke Maternal Grandfather     Heart disease Maternal Grandfather     Diabetes Paternal Grandfather         type 2    Heart disease Paternal Grandfather     No Known Problems Sister     Heart disease Sister         failure    Heart disease Maternal Aunt      I have reviewed and agree with the history as documented  E-Cigarette/Vaping    E-Cigarette Use Never User      E-Cigarette/Vaping Substances    Nicotine No     THC No     CBD No     Flavoring No     Other No     Unknown No      Social History     Tobacco Use    Smoking status: Never Smoker    Smokeless tobacco: Never Used   Substance Use Topics    Alcohol use: Not Currently    Drug use: No       Review of Systems   Constitutional: Negative for fever  HENT: Negative for congestion, rhinorrhea and sore throat  Respiratory: Negative for shortness of breath  Cardiovascular: Negative for chest pain  Gastrointestinal: Positive for abdominal pain, diarrhea and nausea  Negative for vomiting  Genitourinary: Negative for dysuria  Musculoskeletal: Negative for back pain and neck pain  Skin: Negative for rash  Neurological: Positive for light-headedness  Negative for weakness, numbness and headaches  All other systems reviewed and are negative  Physical Exam  Physical Exam  Constitutional:       Appearance: She is well-developed  HENT:      Head: Normocephalic and atraumatic  Nose: Nose normal    Eyes:      Conjunctiva/sclera: Conjunctivae normal    Neck:      Musculoskeletal: Normal range of motion  Cardiovascular:      Rate and Rhythm: Normal rate  Pulmonary:      Effort: Pulmonary effort is normal    Abdominal:      Palpations: Abdomen is soft  Tenderness: There is abdominal tenderness in the right lower quadrant and left lower quadrant  There is right CVA tenderness  There is no left CVA tenderness  Musculoskeletal: Normal range of motion  Skin:     General: Skin is warm  Capillary Refill: Capillary refill takes less than 2 seconds  Neurological:      Mental Status: She is alert and oriented to person, place, and time           Vital Signs  ED Triage Vitals [04/16/21 1416]   Temperature Pulse Respirations Blood Pressure SpO2   98 °F (36 7 °C) 67 17 124/75 96 %      Temp Source Heart Rate Source Patient Position - Orthostatic VS BP Location FiO2 (%)   Oral Monitor Sitting Right arm --      Pain Score       --           Vitals:    04/16/21 1416 04/16/21 1627   BP: 124/75 100/59   Pulse: 67 65   Patient Position - Orthostatic VS: Sitting Lying         Visual Acuity      ED Medications  Medications   sodium chloride 0 9 % bolus 1,000 mL (0 mL Intravenous Stopped 4/16/21 1609)   iohexol (OMNIPAQUE) 350 MG/ML injection (MULTI-DOSE) 100 mL (100 mL Intravenous Given 4/16/21 1614)   potassium chloride (K-DUR,KLOR-CON) CR tablet 20 mEq (20 mEq Oral Given 4/16/21 1721)   sulfamethoxazole-trimethoprim (BACTRIM DS) 800-160 mg per tablet 1 tablet (1 tablet Oral Given 4/16/21 1721)       Diagnostic Studies  Results Reviewed     Procedure Component Value Units Date/Time    Urine culture [[id number]]  (Abnormal)  (Susceptibility) Collected: 04/16/21 1500    Lab Status: Final result Specimen: Urine, Clean Catch Updated: 04/19/21 1340     Urine Culture >100,000 cfu/ml Staphylococcus coagulase negative    Susceptibility     Staphylococcus coagulase negative (1)     Antibiotic Interpretation Microscan Method Status    ZID Performed  Yes  DEEPA Final    Ampicillin ($$) Resistant <=2 00 ug/ml DEEPA Final    Cefazolin ($) Susceptible <=4 00 ug/ml DEEPA Final    Gentamicin ($$) Susceptible <=1 ug/ml DEEPA Final    Nitrofurantoin Susceptible <=32 ug/ml DEEPA Final    Oxacillin Susceptible <=0 25 ug/ml DEEPA Final    Tetracycline Susceptible <=2 ug/ml DEEPA Final    Trimethoprim + Sulfamethoxazole ($$$) Susceptible <=0 5/9 5 ug/ml DEEPA Final    Vancomycin ($) Susceptible 1 00 ug/ml DEEPA Final                   COVID19, Influenza A/B, RSV PCR, [facility] [[id number]]  (Normal) Collected: 04/16/21 1504    Lab Status: Final result Specimen: Nares from Nasopharyngeal Swab Updated: 04/16/21 1657     SARS-CoV-2 Negative     INFLUENZA A PCR Negative     INFLUENZA B PCR Negative     RSV PCR Negative    Narrative: This test has been authorized by FDA under an EUA (Emergency Use Assay) for use by authorized laboratories  Clinical caution and judgement should be used with the interpretation of these results with consideration of the clinical impression and other laboratory testing  Testing reported as "Positive" or "Negative" has been proven to be accurate according to standard laboratory validation requirements  All testing is performed with control materials showing appropriate reactivity at standard intervals      Urine Microscopic [[id number]]  (Abnormal) Collected: 04/16/21 1500    Lab Status: Final result Specimen: Urine, Clean Catch Updated: 04/16/21 1616     RBC, UA 10-20 /hpf      WBC, UA 10-20 /hpf      Epithelial Cells Occasional /hpf      Bacteria, UA Innumerable /hpf      MUCUS THREADS Innumerable    UA w Reflex to Microscopic w Reflex to Culture [[id number]]  (Abnormal) Collected: 04/16/21 1500    Lab Status: Final result Specimen: Urine, Clean Catch Updated: 04/16/21 1600     Color, UA Dk Yellow     Clarity, UA Cloudy     Specific Gravity, UA 1 026     pH, UA 6 0     Leukocytes, UA Small     Nitrite, UA Positive     Protein, UA Negative mg/dl      Glucose, UA Negative mg/dl      Ketones, UA 40 (2+) mg/dl      Urobilinogen, UA 1 0 E U /dl      Bilirubin, UA Negative     Blood, UA Large    Lipase [[id number]]  (Normal) Collected: 04/16/21 1501    Lab Status: Final result Specimen: Blood from Arm, Left Updated: 04/16/21 1537     Lipase 100 u/L     Troponin I [[id number]]  (Normal) Collected: 04/16/21 1501    Lab Status: Final result Specimen: Blood from Arm, Left Updated: 04/16/21 1537     Troponin I <0 02 ng/mL     Comprehensive metabolic panel [[id number]]  (Abnormal) Collected: 04/16/21 1501    Lab Status: Final result Specimen: Blood from Arm, Left Updated: 04/16/21 1537     Sodium 140 mmol/L      Potassium 3 3 mmol/L      Chloride 107 mmol/L      CO2 27 mmol/L      ANION GAP 6 mmol/L      BUN 12 mg/dL      Creatinine 0 64 mg/dL      Glucose 67 mg/dL      Calcium 9 3 mg/dL      AST 11 U/L      ALT 18 U/L      Alkaline Phosphatase 76 U/L      Total Protein 7 6 g/dL      Albumin 4 2 g/dL      Total Bilirubin 1 07 mg/dL      eGFR 116 ml/min/1 73sq m     Narrative:      Prabhu guidelines for Chronic Kidney Disease (CKD):     Stage 1 with normal or high GFR (GFR > 90 mL/min/1 73 square meters)    Stage 2 Mild CKD (GFR = 60-89 mL/min/1 73 square meters)    Stage 3A Moderate CKD (GFR = 45-59 mL/min/1 73 square meters)    Stage 3B Moderate CKD (GFR = 30-44 mL/min/1 73 square meters)    Stage 4 Severe CKD (GFR = 15-29 mL/min/1 73 square meters)    Stage 5 End Stage CKD (GFR <15 mL/min/1 73 square meters)  Note: GFR calculation is accurate only with a steady state creatinine    Magnesium [[id number]]  (Normal) Collected: 04/16/21 1501    Lab Status: Final result Specimen: Blood from Arm, Left Updated: 04/16/21 1537     Magnesium 2 3 mg/dL     CBC and differential [[id number]]  (Abnormal) Collected: 04/16/21 1501    Lab Status: Final result Specimen: Blood from Arm, Left Updated: 04/16/21 1528     WBC 8 01 Thousand/uL      RBC 4 90 Million/uL      Hemoglobin 14 5 g/dL      Hematocrit 43 4 %      MCV 89 fL      MCH 29 6 pg      MCHC 33 4 g/dL      RDW 12 1 %      MPV 10 3 fL      Platelets 414 Thousands/uL      nRBC 0 /100 WBCs      Neutrophils Relative 76 %      Immat GRANS % 0 %      Lymphocytes Relative 18 %      Monocytes Relative 4 %      Eosinophils Relative 2 %      Basophils Relative 0 %      Neutrophils Absolute 6 06 Thousands/µL      Immature Grans Absolute 0 02 Thousand/uL      Lymphocytes Absolute 1 47 Thousands/µL      Monocytes Absolute 0 29 Thousand/µL      Eosinophils Absolute 0 14 Thousand/µL      Basophils Absolute 0 03 Thousands/µL     POCT pregnancy, urine [[id number]]  (Normal) Resulted: 04/16/21 1509    Lab Status: Final result Updated: 04/16/21 1509     EXT PREG TEST UR (Ref: Negative) negative     Control valid    Stool Enteric Bacterial Panel by PCR [[id number]]     Lab Status: No result Specimen: Stool     Clostridium difficile toxin by PCR with EIA [[id number]]     Lab Status: No result Specimen: Stool     Ova and parasite examination [[id number]]     Lab Status: No result Specimen: Stool from Rectum                  XR follow up   Final Result by Lynda Pack DO (04/16 1744)   Impression:      Patient's known shunt is not clearly visualized on this image  Workstation performed: [id number]         CT head without contrast   Final Result by Silvia Gaona MD (04/16 2065)      No acute intracranial abnormality  Typical postoperative changes for Chiari I malformation including suboccipital craniectomy  Stable shunt position with tip in the 4th ventricle              Workstation performed: [id number]         CT abdomen pelvis with contrast   Final Result by Medina Champion MD (04/16 1636)      Mild wall thickening involving the stomach and proximal jejunum in keeping with gastroenteritis  Workstation performed: [id number]                    Procedures  Procedures         ED Course  ED Course as of Apr 20 0802 Fri Apr 16, 2021   1426 Blood Pressure: 124/75   1427 Temperature: 98 °F (36 7 °C)   1427 Pulse: 67   1427 Respirations: 17   1427 SpO2: 96 %   1511 PREGNANCY TEST URINE: negative   1530 WBC: 8 01   1530 Hemoglobin: 14 5   1530 Neutrophils %(!): 76   1541 Troponin I: <0 02   1541 Creatinine: 0 64   1541 TOTAL BILIRUBIN(!): 1 07   1541 Magnesium: 2 3   1541 Lipase: 100   1602 Leukocytes, UA(!): Small   1603 Nitrite, UA(!): Positive   1603 Patient given fluids in the ER   Ketones, UA(!): 40 (2+)   1603 Blood, UA(!): Large   1626 WBC, UA(!): 10-20   1626 Bacteria, UA(!): Innumerable   1630 Patients shunt does not extend into peritoneum, shunt series canceled      1634 IMPRESSION:     No acute intracranial abnormality      Typical postoperative changes for Chiari I malformation including suboccipital craniectomy  Stable shunt position with tip in the 4th ventricle  CT head without contrast   1640 IMPRESSION:     Mild wall thickening involving the stomach and proximal jejunum in keeping with gastroenteritis  CT abdomen pelvis with contrast   1702 SARS-COV-2: Negative                                           MDM     Patient with resolution of symptoms in the ER  Labs and imaging grossly benign  Discussed likely gastroenteritis  Discussed symptomatic treatment and strict return instructions  Patient in no acute distress throughout ER stay  Vitals stable and reassuring  Patient stable for discharge at this time  Reviewed plan with patient/family  Reviewed red flag symptoms and strict return instructions  Patient/family voiced understanding and agreement to plan    Patient/family had opportunity to ask questions and all questions were answered at bedside  Disposition  Final diagnoses:   Gastroenteritis   UTI (urinary tract infection)     Time reflects when diagnosis was documented in both MDM as applicable and the Disposition within this note     Time User Action Codes Description Comment    4/16/2021  5:09 PM Panda Flores Add [K52 9] Gastroenteritis     4/16/2021  5:10 PM Tracey Dent Add [N39 0] UTI (urinary tract infection)       ED Disposition     ED Disposition Condition Date/Time Comment    Discharge Stable Fri Apr 16, 5586  0:30 PM Ary Clarke Stephen discharge to home/self care  Follow-up Information     Follow up With Specialties Details Why Contact Info Additional [address] Emergency [facility] Emergency Medicine  If symptoms worsen [address]  958 [facility] 64 [facility] Emergency Department, 600 [facility] 20, [place], [place], [facility] 108    hle 77 Urology In 2 days  [address] [facility] [address]  701  [facility], [address] [place] [address], 29 [facility] G[facility] In 2 days  [address] [facility] 33[address] [facility] [address]  Pauly Yoder 1471 [facility], [address] [place] [address] [address], [address], [place], [place], [address]          Discharge Medication List as of 4/16/2021  5:13 PM      START taking these medications    Details   sulfamethoxazole-trimethoprim (BACTRIM DS) 800-160 mg per tablet Take 1 tablet by mouth 2 (two) times a day for 14 days smx-tmp DS (BACTRIM) 800-160 mg tabs (1tab q12 D10), Starting Fri 4/16/2021, Until Fri 4/30/2021, Normal           No discharge procedures on file      PDMP Review       Value Time User    PDMP Reviewed  Yes 5/29/2020  1:25 PM Ever Husain MD          ED Provider  Electronically Signed by           Amada Pathak PA-C  04/20/21 5960  [address] KATINA Becerra  04/20/21 0623

## 2021-04-16 NOTE — DISCHARGE INSTRUCTIONS
Return to the ER with any new or worsening of symptoms such as but not limited to increased pain, blood in stool, inability to tolerate liquids, fevers, difficulty urinating, chest pain, headaches, or any other concerning symptoms    Take antibiotics as prescribed to completion  Thank you for allowing us to be part of your care today

## 2021-04-16 NOTE — Clinical Note
Alexia Cunha was seen and treated in our emergency department on 4/16/2021  Diagnosis:     Ary  may return to work on return date  She may return on this date: 04/20/2021         If you have any questions or concerns, please don't hesitate to call        Mushtaq Julian PA-C    ______________________________           _______________          _______________  Hospital Representative                              Date                                Time

## 2021-04-17 LAB
ATRIAL RATE: 65 BPM
P AXIS: 73 DEGREES
PR INTERVAL: 144 MS
QRS AXIS: 84 DEGREES
QRSD INTERVAL: 62 MS
QT INTERVAL: 396 MS
QTC INTERVAL: 411 MS
T WAVE AXIS: 66 DEGREES
VENTRICULAR RATE: 65 BPM

## 2021-04-17 PROCEDURE — 93010 ELECTROCARDIOGRAM REPORT: CPT | Performed by: INTERNAL MEDICINE

## 2021-04-19 LAB — BACTERIA UR CULT: ABNORMAL

## 2021-06-01 ENCOUNTER — OFFICE VISIT (OUTPATIENT)
Dept: URGENT CARE | Facility: MEDICAL CENTER | Age: 36
End: 2021-06-01
Payer: COMMERCIAL

## 2021-06-01 VITALS
OXYGEN SATURATION: 100 % | TEMPERATURE: 97.8 F | HEIGHT: 64 IN | BODY MASS INDEX: 20.14 KG/M2 | RESPIRATION RATE: 18 BRPM | WEIGHT: 118 LBS | HEART RATE: 89 BPM

## 2021-06-01 DIAGNOSIS — J06.9 ACUTE URI: Primary | ICD-10-CM

## 2021-06-01 LAB
S PYO AG THROAT QL: NEGATIVE
S PYO AG THROAT QL: NEGATIVE

## 2021-06-01 PROCEDURE — 99213 OFFICE O/P EST LOW 20 MIN: CPT | Performed by: PHYSICIAN ASSISTANT

## 2021-06-01 PROCEDURE — 87147 CULTURE TYPE IMMUNOLOGIC: CPT | Performed by: PHYSICIAN ASSISTANT

## 2021-06-01 PROCEDURE — 87070 CULTURE OTHR SPECIMN AEROBIC: CPT | Performed by: PHYSICIAN ASSISTANT

## 2021-06-01 NOTE — PROGRESS NOTES
[address] [employer] [facility]        NAME: Mariana Hanson is a 28 y o  female  : 1985    MRN: [id number]  DATE: 2021  TIME: 5:12 PM    Assessment and Plan   Acute URI [J06 9]  1  Acute URI  POCT rapid strepA         Patient Instructions     Upper respiratory infection  Increase fluid intake  Steam from shower  Follow up with PCP in 3-5 days  Proceed to  ER if symptoms worsen  Chief Complaint     Chief Complaint   Patient presents with    Cold Like Symptoms     cough, sore throat,          History of Present Illness       27 y/o female presents c/o sore throat, non productive cough, and runny nose x 2 days  Denies chest pain, SOB, fever, chills  Declined covid test      Review of Systems   Review of Systems      Current Medications     No current outpatient medications on file      Current Allergies     Allergies as of 2021 - Reviewed 2021   Allergen Reaction Noted    Cephalexin  2017    Sunscreens Hives 2016            The following portions of the patient's history were reviewed and updated as appropriate: allergies, current medications, past family history, past medical history, past social history, past surgical history and problem list      Past Medical History:   Diagnosis Date    Chiari I malformation ([address] )     Kidney stone     UTI (urinary tract infection)     treated with antibiotics recently will have ua repeated     Varicella     childhood       Past Surgical History:   Procedure Laterality Date    AK CONIZATION CERVIX,LOOP ELECTRD N/A 2020    Procedure: BIOPSY LEEP CERVIX;  Surgeon: Ariel Guthrie MD;  [facility];  Service: Gynecology    AK LAP,RMV  ADNEXAL STRUCTURE Bilateral 2020    Procedure: SALPINGECTOMY, LAPAROSCOPIC BILATERAL;  Surgeon: Ariel Guthrie MD;  [facility];  Service: Gynecology    AK SUBOCCIPT DECOMP MEDULLA/SP CRD N/A 2016    Procedure: REPEAT SUBOCCIPITAL CRANIECTOMY; C1 LAMINECTOMY AND DURAPLASTY; PLACEMENT OF 4TH VENTRICULAR-SUBARACHNOID SHUNT; IMPULSE MONITORING;  Surgeon: Zoe Mendez MD;  [facility];  Service: Neurosurgeryx3    [place] TOTAL [address], CERVICAL, SINGLE N/A 1/25/2019    Procedure: Anterior cervical discectomy and total disc arthroplasty C5-6;  Surgeon: Zoe Mendez MD;  [facility];  Service: Neurosurgery    TUBAL LIGATION  06/2020       Family History   Problem Relation Age of Onset    Other Mother         sleep apnea    Hypertension Father     Drug abuse Father     Alcohol abuse Father     No Known Problems Sister     Asthma Son     Heart disease Maternal Grandmother     Diabetes Paternal Grandmother         type 2    Arthritis Paternal Grandmother     No Known Problems Daughter     Stroke Maternal Grandfather     Heart disease Maternal Grandfather     Diabetes Paternal Grandfather         type 2    Heart disease Paternal Grandfather     No Known Problems Sister     Heart disease Sister         failure    Heart disease Maternal Aunt          Medications have been verified          Objective   Pulse 89   Temp 97 8 °F (36 6 °C)   Resp 18   Ht 5' 3 5" (1 613 m)   Wt 53 5 kg (118 lb)   SpO2 100%   Breastfeeding No   BMI 20 57 kg/m²        Physical Exam     Physical Exam

## 2021-06-01 NOTE — PATIENT INSTRUCTIONS
Upper respiratory infection  Follow up with PCP in 3-5 days  Proceed to  ER if symptoms worsenPharyngitis   WHAT YOU NEED TO KNOW:   Pharyngitis, or sore throat, is inflammation of the tissues and structures in your pharynx (throat)  Pharyngitis is most often caused by bacteria  It may also be caused by a cold or flu virus  Other causes include smoking, allergies, or acid reflux  DISCHARGE INSTRUCTIONS:   Call 911 for any of the following:   · You have trouble breathing or swallowing because your throat is swollen or sore  Return to the emergency department if:   · You are drooling because it hurts too much to swallow  · Your fever is higher than 102? F (39?C) or lasts longer than 3 days  · You are confused  · You taste blood in your throat  Contact your healthcare provider if:   · Your throat pain gets worse  · You have a painful lump in your throat that does not go away after 5 days  · Your symptoms do not improve after 5 days  · You have questions or concerns about your condition or care  Medicines:  Viral pharyngitis will go away on its own without treatment  Your sore throat should start to feel better in 3 to 5 days for both viral and bacterial infections  You may need any of the following:  · Antibiotics  treat a bacterial infection  · NSAIDs , such as ibuprofen, help decrease swelling, pain, and fever  NSAIDs can cause stomach bleeding or kidney problems in certain people  If you take blood thinner medicine, always ask your healthcare provider if NSAIDs are safe for you  Always read the medicine label and follow directions  · Acetaminophen  decreases pain and fever  It is available without a doctor's order  Ask how much to take and how often to take it  Follow directions  Acetaminophen can cause liver damage if not taken correctly  · Take your medicine as directed  Contact your healthcare provider if you think your medicine is not helping or if you have side effects  Tell him or her if you are allergic to any medicine  Keep a list of the medicines, vitamins, and herbs you take  Include the amounts, and when and why you take them  Bring the list or the pill bottles to follow-up visits  Carry your medicine list with you in case of an emergency  Manage your symptoms:   · Gargle salt water  Mix ¼ teaspoon salt in an 8 ounce glass of warm water and gargle  This may help decrease swelling in your throat  · Drink liquids as directed  You may need to drink more liquids than usual  Liquids may help soothe your throat and prevent dehydration  Ask how much liquid to drink each day and which liquids are best for you  · Use a cool-steam humidifier  to help moisten the air in your room and calm your cough  · Soothe your throat  with cough drops, ice, soft foods, or popsicles  Prevent the spread of pharyngitis:  Cover your mouth and nose when you cough or sneeze  Do not share food or drinks  Wash your hands often  Use soap and water  If soap and water are unavailable, use an alcohol based hand   Follow up with your healthcare provider as directed:  Write down your questions so you remember to ask them during your visits  © Copyright [address] [place] [address] Drive Information is for End User's use only and may not be sold, redistributed or otherwise used for commercial purposes  All illustrations and images included in CareNotes® are the copyrighted property of A D A M , Inc  or Mir Sebastian  The above information is an  only  It is not intended as medical advice for individual conditions or treatments  Talk to your doctor, nurse or pharmacist before following any medical regimen to see if it is safe and effective for you

## 2021-06-04 ENCOUNTER — TELEPHONE (OUTPATIENT)
Dept: URGENT CARE | Facility: MEDICAL CENTER | Age: 36
End: 2021-06-04

## 2021-06-04 DIAGNOSIS — J02.0 STREP PHARYNGITIS: Primary | ICD-10-CM

## 2021-06-04 LAB — BACTERIA THROAT CULT: ABNORMAL

## 2021-06-04 RX ORDER — AMOXICILLIN 500 MG/1
500 CAPSULE ORAL EVERY 12 HOURS SCHEDULED
Qty: 14 CAPSULE | Refills: 0 | Status: SHIPPED | OUTPATIENT
Start: 2021-06-04 | End: 2021-06-11

## 2021-06-04 NOTE — TELEPHONE ENCOUNTER
Patient called for strep results, informed her they were positive in she is still symptomatic  Will treat with amoxicillin, reports no allergy to this  Would like script called into [facility]  Patient appreciative of call

## 2021-12-10 ENCOUNTER — OFFICE VISIT (OUTPATIENT)
Dept: URGENT CARE | Facility: MEDICAL CENTER | Age: 36
End: 2021-12-10
Payer: COMMERCIAL

## 2021-12-10 VITALS
OXYGEN SATURATION: 98 % | HEIGHT: 64 IN | TEMPERATURE: 97.6 F | WEIGHT: 118 LBS | HEART RATE: 76 BPM | BODY MASS INDEX: 20.14 KG/M2

## 2021-12-10 DIAGNOSIS — J02.9 SORE THROAT: Primary | ICD-10-CM

## 2021-12-10 LAB — S PYO AG THROAT QL: POSITIVE

## 2021-12-10 PROCEDURE — 87880 STREP A ASSAY W/OPTIC: CPT

## 2021-12-10 PROCEDURE — G0382 LEV 3 HOSP TYPE B ED VISIT: HCPCS

## 2021-12-10 RX ORDER — AMOXICILLIN 875 MG/1
875 TABLET, COATED ORAL 2 TIMES DAILY
Qty: 14 TABLET | Refills: 0 | Status: SHIPPED | OUTPATIENT
Start: 2021-12-10 | End: 2021-12-17

## 2022-02-04 ENCOUNTER — TELEPHONE (OUTPATIENT)
Dept: NEUROSURGERY | Facility: CLINIC | Age: 37
End: 2022-02-04

## 2022-02-11 ENCOUNTER — TELEPHONE (OUTPATIENT)
Dept: NEUROSURGERY | Facility: CLINIC | Age: 37
End: 2022-02-11

## 2022-02-11 NOTE — TELEPHONE ENCOUNTER
Call placed to patient to review progress and symptoms since last visit  Patient endorses increased headaches  States headaches have been severe some lasting for up to 3 hours and not resolved with Aleve  She states Aleve generally has helped her headaches in the past      In addition, she endorses increased neck pain with now minor activities which did not bother her in the past  She states when lying flat in bed she develops a feeling of her bilateral extremities "feeling as though they feel sleep "  She continues with frequent abnormal sensations in her hands  Denies change in gait or bowel/bladder incontinence

## 2022-02-18 ENCOUNTER — HOSPITAL ENCOUNTER (OUTPATIENT)
Dept: RADIOLOGY | Facility: HOSPITAL | Age: 37
Discharge: HOME | End: 2022-02-18
Attending: NEUROLOGICAL SURGERY
Payer: COMMERCIAL

## 2022-02-18 DIAGNOSIS — G89.4 CHRONIC PAIN SYNDROME: ICD-10-CM

## 2022-02-18 DIAGNOSIS — G95.0 SYRINGOMYELIA (HCC): ICD-10-CM

## 2022-02-18 PROCEDURE — G1004 CDSM NDSC: HCPCS

## 2022-02-18 PROCEDURE — 72141 MRI NECK SPINE W/O DYE: CPT

## 2022-02-22 ENCOUNTER — OFFICE VISIT (OUTPATIENT)
Dept: NEUROSURGERY | Facility: CLINIC | Age: 37
End: 2022-02-22
Payer: COMMERCIAL

## 2022-02-22 VITALS
WEIGHT: 112 LBS | DIASTOLIC BLOOD PRESSURE: 69 MMHG | HEIGHT: 63 IN | HEART RATE: 66 BPM | TEMPERATURE: 98.7 F | BODY MASS INDEX: 19.84 KG/M2 | RESPIRATION RATE: 16 BRPM | SYSTOLIC BLOOD PRESSURE: 117 MMHG

## 2022-02-22 DIAGNOSIS — G95.0 SYRINGOMYELIA (HCC): Primary | ICD-10-CM

## 2022-02-22 DIAGNOSIS — Z86.69 HISTORY OF CHIARI MALFORMATION: ICD-10-CM

## 2022-02-22 DIAGNOSIS — M54.2 NECK PAIN: ICD-10-CM

## 2022-02-22 DIAGNOSIS — M50.20 HERNIATED CERVICAL DISC: ICD-10-CM

## 2022-02-22 PROCEDURE — 99213 OFFICE O/P EST LOW 20 MIN: CPT | Performed by: NEUROLOGICAL SURGERY

## 2022-02-22 NOTE — PROGRESS NOTES
DISCUSSION SUMMARY  This is a 39 y o  female who is status post a Chiari decompression and placement of a 4th ventricular to subarachnoid space shunt  In general she is doing very well however she gets periodic headaches  I do not seen an explanation for these  I do recommend that she begin some physical therapy  There is no change in her syrinx  She also complained of some neck pain  I recommended physical therapy for this  The MRI did show a small foraminal disc herniation which does not require surgical intervention but which may be helped with physical therapy  Return in about 1 year (around 2/22/2023) for follow-up after test         Diagnosis ICD-10-CM Associated Orders   1  Syringomyelia ([facility] [address] )  G95 0 MRI cervical spine without contrast   2  History of Chiari malformation  Z86 69 MRI cervical spine without contrast          Chief Complaint   Patient presents with    Follow-up     1 year F/u for Increased neck pain and headaches, S/p (DKO) Anterior cervical discectomy and total disc arthroplasty C5-6 [1/2019] and REPEAT SUBOCCIPITAL CRANIECTOMY & C1 LAMINECTOMY AND DURAPLASTY [12/2016]; MRI C-Spine 2/18/22       HPI this is a 70-year-old female status post multiple Chiari decompressions, a cervical syrinx, and a disc herniation requiring a disc arthroplasty  Her balance is slightly improved  She works mainly from home in goes out to take her kids to school  She is aware of ergonomics and attempts to change her position to maximize her lack of neck fatigue  She does still have some neck pain  She denies any radiculopathic pain  She has not fallen  She has not noticed change in her balance  She does complain of 1 headache that occurred 2-3 weeks ago which was very severe and was unprovoked  Medications would not help it  Headache spontaneously resolved  Review of Systems   Constitutional: Positive for fatigue  HENT: Negative      Eyes: Positive for photophobia (occurs with some headaches)  Respiratory: Negative  Cardiovascular: Negative  Gastrointestinal: Negative  Negative for nausea and vomiting  Endocrine: Negative  Genitourinary: Negative  Musculoskeletal: Positive for back pain, gait problem (occasionally unsteady) and neck pain (midline into bilateral shoulders with occasional radiation into the arms; her pain does extend into her mid back)  Skin: Negative  Allergic/Immunologic: Negative  Neurological: Positive for numbness (numbness and tingling in both arms/hands/fingers occasionally  Patient reports after falling asleep on her back, her limbs become numb ) and headaches (entire head, last headache (2-3 Weeks ago) lasted 3 hours even with medication )  Negative for weakness  No recent falls  No recent OT, ST, or PT  No INJ     Hematological: Negative  Psychiatric/Behavioral: Positive for sleep disturbance (due to pain; at least 1x/night )  I reviewed the ROS        Vitals:    /69 (BP Location: Left arm, Patient Position: Sitting, Cuff Size: Standard)   Pulse 66   Temp 98 7 °F (37 1 °C) (Probe)   Resp 16   Ht 5' 3" (1 6 m)   Wt 50 8 kg (112 lb)   BMI 19 84 kg/m²       MEDICAL HISTORY  Past Medical History:   Diagnosis Date    Chiari I malformation ([facility] [address] )     Kidney stone     UTI (urinary tract infection)     treated with antibiotics recently will have ua repeated     Varicella     childhood     Past Surgical History:   Procedure Laterality Date    [place] CONIZATION CERVIX,LOOP ELECTRD N/A 5/29/2020    Procedure: BIOPSY LEEP CERVIX;  Surgeon: Jairon Dickson MD;  [facility];  Service: Gynecology    [place] LAP,RMV  ADNEXAL STRUCTURE Bilateral 5/29/2020    Procedure: SALPINGECTOMY, LAPAROSCOPIC BILATERAL;  Surgeon: Jairon Dickson MD;  [facility];  Service: Gynecology    [place] SUBOCCIPT DECOMP MEDULLA/SP CRD N/A 12/14/2016    Procedure: REPEAT SUBOCCIPITAL CRANIECTOMY; C1 LAMINECTOMY AND DURAPLASTY; PLACEMENT OF 4TH VENTRICULAR-SUBARACHNOID SHUNT; IMPULSE MONITORING;  Surgeon: Anup Garrison MD;  [facility];  Service: Neurosurgeryx3    [place] TOTAL [address], CERVICAL, SINGLE N/A 1/25/2019    Procedure: Anterior cervical discectomy and total disc arthroplasty C5-6;  Surgeon: Anup Garrison MD;  [facility];  Service: Neurosurgery    TUBAL LIGATION  06/2020     Social History     Tobacco Use    Smoking status: Never Smoker    Smokeless tobacco: Never Used   Vaping Use    Vaping Use: Never used   Substance Use Topics    Alcohol use: Yes     Comment: occasionally    Drug use: No      No current outpatient medications on file  Allergies   Allergen Reactions    Cephalexin      Annotation - [id number]: gives hives    Sunscreens Hives        The following portions of the patient's history were updated by MA and reviewed by MD: allergies, current medications, past family history, past medical history, past social history, past surgical history and problem list       Physical Exam  Vitals and nursing note reviewed  Constitutional:       General: She is not in acute distress  Appearance: Normal appearance  She is not ill-appearing, toxic-appearing or diaphoretic  HENT:      Head: Normocephalic  Nose: Nose normal    Eyes:      Extraocular Movements: Extraocular movements intact  Pupils: Pupils are equal, round, and reactive to light  Musculoskeletal:         General: No swelling, tenderness, deformity or signs of injury  Normal range of motion  Cervical back: Normal range of motion and neck supple  No rigidity  Right lower leg: No edema  Left lower leg: No edema  Lymphadenopathy:      Cervical: No cervical adenopathy  Skin:     General: Skin is warm and dry  Coloration: Skin is not jaundiced  Findings: No erythema or rash  Neurological:      General: No focal deficit present  Mental Status: She is alert and oriented to person, place, and time  Cranial Nerves: No cranial nerve deficit  Sensory: No sensory deficit  Motor: No weakness  Coordination: Coordination normal       Gait: Gait normal       Deep Tendon Reflexes: Reflexes normal    Psychiatric:         Mood and Affect: Mood normal          Behavior: Behavior normal          Thought Content: Thought content normal          Judgment: Judgment normal            RESULTS/DATA  I have personally reviewed pertinent films in PACS   MRI of the cervical spine is carefully reviewed  This demonstrates a cervical syrinx which is unchanged in size or position  The arthroplasty appears to be in good position    There is a small foraminal disc herniation which is minimal

## 2022-04-21 ENCOUNTER — OFFICE VISIT (OUTPATIENT)
Dept: OBGYN CLINIC | Facility: CLINIC | Age: 37
End: 2022-04-21
Payer: COMMERCIAL

## 2022-04-21 VITALS
HEIGHT: 63 IN | DIASTOLIC BLOOD PRESSURE: 62 MMHG | SYSTOLIC BLOOD PRESSURE: 110 MMHG | BODY MASS INDEX: 20.02 KG/M2 | WEIGHT: 113 LBS

## 2022-04-21 DIAGNOSIS — Z11.51 SCREENING FOR HUMAN PAPILLOMAVIRUS (HPV): ICD-10-CM

## 2022-04-21 DIAGNOSIS — Z11.3 SCREEN FOR STD (SEXUALLY TRANSMITTED DISEASE): ICD-10-CM

## 2022-04-21 DIAGNOSIS — Z01.419 ENCNTR FOR GYN EXAM (GENERAL) (ROUTINE) W/O ABN FINDINGS: Primary | ICD-10-CM

## 2022-04-21 PROBLEM — Z30.2 STERILIZATION: Status: RESOLVED | Noted: 2020-02-28 | Resolved: 2022-04-21

## 2022-04-21 PROCEDURE — 87491 CHLMYD TRACH DNA AMP PROBE: CPT | Performed by: OBSTETRICS & GYNECOLOGY

## 2022-04-21 PROCEDURE — 87591 N.GONORRHOEAE DNA AMP PROB: CPT | Performed by: OBSTETRICS & GYNECOLOGY

## 2022-04-21 PROCEDURE — 99395 PREV VISIT EST AGE 18-39: CPT | Performed by: OBSTETRICS & GYNECOLOGY

## 2022-04-21 PROCEDURE — G0476 HPV COMBO ASSAY CA SCREEN: HCPCS | Performed by: OBSTETRICS & GYNECOLOGY

## 2022-04-21 PROCEDURE — G0145 SCR C/V CYTO,THINLAYER,RESCR: HCPCS | Performed by: OBSTETRICS & GYNECOLOGY

## 2022-04-21 NOTE — PROGRESS NOTES
Ary Morocho  [birth year]      CC:  Yearly exam    S:  39 y o  female here for yearly exam  Her cycles are regular, not heavy or crampy  Sexual activity: She is sexually active without pain, bleeding or dryness  Contraception: She uses her tubal for contraception  STD testing:  She does want STD testing today as a precaution; she has no symptoms but has had a new partner since her delivery  Last Pap 1/27/2021 - normal; +HPV; history of PALLAVI 3 on colposcopy; s/p LEEP with PALLAVI 2 at margin in 5/2020    We reviewed ASCCP guidelines for Pap testing today  No current outpatient medications on file    Social History     Socioeconomic History    Marital status:      Spouse name: Not on file    Number of children: Not on file    Years of education: Not on file    Highest education level: Not on file   Occupational History    Not on file   Tobacco Use    Smoking status: Never Smoker    Smokeless tobacco: Never Used   Vaping Use    Vaping Use: Never used   Substance and Sexual Activity    Alcohol use: Yes     Comment: occasionally    Drug use: No    Sexual activity: Yes     Partners: Male     Birth control/protection: Female Sterilization   Other Topics Concern    Not on file   Social History Narrative    Not on file     Social Determinants of Health     Financial Resource Strain: Not on file   Food Insecurity: Not on file   Transportation Needs: Not on file   Physical Activity: Not on file   Stress: Not on file   Social Connections: Not on file   Intimate Partner Violence: Not on file   Housing Stability: Not on file     Family History   Problem Relation Age of Onset    Other Mother         sleep apnea    Hypertension Father     Drug abuse Father     Alcohol abuse Father     No Known Problems Sister     Asthma Son     Heart disease Maternal Grandmother     Diabetes Paternal Grandmother         type 2    Arthritis Paternal Grandmother     No Known Problems Daughter     Stroke Maternal Grandfather     Heart disease Maternal Grandfather     Diabetes Paternal Grandfather         type 2    Heart disease Paternal Grandfather     No Known Problems Sister     Heart disease Sister         failure    Heart disease Maternal Aunt       Past Medical History:   Diagnosis Date    Abnormal Pap smear of cervix     Chiari I malformation ([address] )     Kidney stone     UTI (urinary tract infection)     treated with antibiotics recently will have ua repeated     Varicella     childhood        Review of Systems   Respiratory: Negative  Cardiovascular: Negative  Gastrointestinal: Negative for constipation and diarrhea  Genitourinary: Negative for difficulty urinating, pelvic pain, vaginal bleeding, vaginal discharge, itching or odor  O:  Blood pressure 110/62, height 5' 2 5" (1 588 m), weight 51 3 kg (113 lb), last menstrual period 03/28/2022, not currently breastfeeding  Patient appears well and is not in distress  Neck is supple without masses  Breasts are symmetrical without mass, tenderness, nipple discharge, skin changes or adenopathy  Abdomen is soft and nontender without masses  External genitals are normal without lesions or rashes  Urethral meatus and urethra are normal  Bladder is normal to palpation  Vagina is normal without discharge or bleeding  Cervix is normal without discharge or lesion  Uterus is normal, mobile, nontender without palpable mass  Adnexa are normal, nontender, without palpable mass  A:  Yearly exam      P:   Pap with HPV sent - will call with results    RTO one year for yearly exam or sooner as needed

## 2022-04-22 LAB
HPV HR 12 DNA CVX QL NAA+PROBE: NEGATIVE
HPV16 DNA CVX QL NAA+PROBE: NEGATIVE
HPV18 DNA CVX QL NAA+PROBE: NEGATIVE

## 2022-04-23 LAB
C TRACH DNA SPEC QL NAA+PROBE: NEGATIVE
N GONORRHOEA DNA SPEC QL NAA+PROBE: NEGATIVE

## 2022-04-26 LAB
LAB AP GYN PRIMARY INTERPRETATION: NORMAL
LAB AP LMP: NORMAL
Lab: NORMAL

## 2022-11-06 ENCOUNTER — HOSPITAL ENCOUNTER (EMERGENCY)
Facility: HOSPITAL | Age: 37
Discharge: HOME | End: 2022-11-06
Attending: EMERGENCY MEDICINE

## 2022-11-06 ENCOUNTER — APPOINTMENT (EMERGENCY)
Dept: CT IMAGING | Facility: HOSPITAL | Age: 37
End: 2022-11-06

## 2022-11-06 VITALS
OXYGEN SATURATION: 98 % | HEART RATE: 64 BPM | SYSTOLIC BLOOD PRESSURE: 104 MMHG | DIASTOLIC BLOOD PRESSURE: 57 MMHG | RESPIRATION RATE: 16 BRPM | TEMPERATURE: 98.4 F

## 2022-11-06 DIAGNOSIS — N39.0 UTI (URINARY TRACT INFECTION): Primary | ICD-10-CM

## 2022-11-06 LAB
ALBUMIN SERPL BCP-MCNC: 4.4 G/DL (ref 3.5–5)
ALP SERPL-CCNC: 59 U/L (ref 34–104)
ALT SERPL W P-5'-P-CCNC: 12 U/L (ref 7–52)
ANION GAP SERPL CALCULATED.3IONS-SCNC: 7 MMOL/L (ref 4–13)
AST SERPL W P-5'-P-CCNC: 14 U/L (ref 13–39)
BACTERIA UR QL AUTO: ABNORMAL /HPF
BASOPHILS # BLD AUTO: 0.03 THOUSANDS/ÂΜL (ref 0–0.1)
BASOPHILS NFR BLD AUTO: 1 % (ref 0–1)
BILIRUB SERPL-MCNC: 0.76 MG/DL (ref 0.2–1)
BILIRUB UR QL STRIP: NEGATIVE
BUN SERPL-MCNC: 15 MG/DL (ref 5–25)
CALCIUM SERPL-MCNC: 9.4 MG/DL (ref 8.4–10.2)
CHLORIDE SERPL-SCNC: 103 MMOL/L (ref 96–108)
CLARITY UR: ABNORMAL
CO2 SERPL-SCNC: 27 MMOL/L (ref 21–32)
COLOR UR: YELLOW
CREAT SERPL-MCNC: 0.64 MG/DL (ref 0.6–1.3)
EOSINOPHIL # BLD AUTO: 0.25 THOUSAND/ÂΜL (ref 0–0.61)
EOSINOPHIL NFR BLD AUTO: 4 % (ref 0–6)
ERYTHROCYTE [DISTWIDTH] IN BLOOD BY AUTOMATED COUNT: 11.9 % (ref 11.6–15.1)
GFR SERPL CREATININE-BSD FRML MDRD: 114 ML/MIN/1.73SQ M
GLUCOSE SERPL-MCNC: 108 MG/DL (ref 65–140)
GLUCOSE UR STRIP-MCNC: NEGATIVE MG/DL
HCT VFR BLD AUTO: 42.9 % (ref 34.8–46.1)
HGB BLD-MCNC: 14.6 G/DL (ref 11.5–15.4)
HGB UR QL STRIP.AUTO: ABNORMAL
IMM GRANULOCYTES # BLD AUTO: 0.01 THOUSAND/UL (ref 0–0.2)
IMM GRANULOCYTES NFR BLD AUTO: 0 % (ref 0–2)
KETONES UR STRIP-MCNC: NEGATIVE MG/DL
LEUKOCYTE ESTERASE UR QL STRIP: ABNORMAL
LIPASE SERPL-CCNC: 22 U/L (ref 11–82)
LYMPHOCYTES # BLD AUTO: 1.95 THOUSANDS/ÂΜL (ref 0.6–4.47)
LYMPHOCYTES NFR BLD AUTO: 32 % (ref 14–44)
MCH RBC QN AUTO: 30.2 PG (ref 26.8–34.3)
MCHC RBC AUTO-ENTMCNC: 34 G/DL (ref 31.4–37.4)
MCV RBC AUTO: 89 FL (ref 82–98)
MONOCYTES # BLD AUTO: 0.33 THOUSAND/ÂΜL (ref 0.17–1.22)
MONOCYTES NFR BLD AUTO: 5 % (ref 4–12)
MUCOUS THREADS UR QL AUTO: ABNORMAL
NEUTROPHILS # BLD AUTO: 3.49 THOUSANDS/ÂΜL (ref 1.85–7.62)
NEUTS SEG NFR BLD AUTO: 58 % (ref 43–75)
NITRITE UR QL STRIP: NEGATIVE
NON-SQ EPI CELLS URNS QL MICRO: ABNORMAL /HPF
NRBC BLD AUTO-RTO: 0 /100 WBCS
PH UR STRIP.AUTO: 6.5 [PH]
PLATELET # BLD AUTO: 237 THOUSANDS/UL (ref 149–390)
PMV BLD AUTO: 9.8 FL (ref 8.9–12.7)
POTASSIUM SERPL-SCNC: 3.5 MMOL/L (ref 3.5–5.3)
PROT SERPL-MCNC: 7.4 G/DL (ref 6.4–8.4)
PROT UR STRIP-MCNC: ABNORMAL MG/DL
RBC # BLD AUTO: 4.84 MILLION/UL (ref 3.81–5.12)
RBC #/AREA URNS AUTO: ABNORMAL /HPF
SODIUM SERPL-SCNC: 137 MMOL/L (ref 135–147)
SP GR UR STRIP.AUTO: 1.02 (ref 1–1.03)
UROBILINOGEN UR STRIP-ACNC: <2 MG/DL
WBC # BLD AUTO: 6.06 THOUSAND/UL (ref 4.31–10.16)
WBC #/AREA URNS AUTO: ABNORMAL /HPF

## 2022-11-06 RX ORDER — CIPROFLOXACIN 500 MG/1
500 TABLET, FILM COATED ORAL 2 TIMES DAILY
Qty: 14 TABLET | Refills: 0 | Status: SHIPPED | OUTPATIENT
Start: 2022-11-06 | End: 2022-11-13

## 2022-11-06 RX ADMIN — IOHEXOL 85 ML: 350 INJECTION, SOLUTION INTRAVENOUS at 20:15

## 2022-11-06 NOTE — ED PROVIDER NOTES
History  Chief Complaint   Patient presents with   • Flank Pain     Abd pain that starts in stomach and radiates to right flank area  Started yesterday afternoon     This is a 26-year-old female without any significant related past medical history presenting to the ED today for a complaint of right flank pain  She denies any other significantly associated symptoms aside from some mild nausea  Patient states that her pain is a 4/10 in intensity, constant in duration, aching in quality, without any alleviating or exacerbating factors  She does not have any other associated symptoms  She has not noticed any inducing or exacerbating factors            None       Past Medical History:   Diagnosis Date   • Abnormal Pap smear of cervix    • Chiari I malformation ([facility])    • Kidney stone    • UTI (urinary tract infection)     treated with antibiotics recently will have ua repeated    • Varicella     childhood       Past Surgical History:   Procedure Laterality Date   • CERVICAL BIOPSY  W/ LOOP ELECTRODE EXCISION     • COLPOSCOPY     • [place] CONIZATION CERVIX,LOOP ELECTRD N/A 5/29/2020    Procedure: BIOPSY LEEP CERVIX;  Surgeon: Antonella Lambert MD;  [facility];  Service: Gynecology   • [place] LAP,RMV  ADNEXAL STRUCTURE Bilateral 5/29/2020    Procedure: SALPINGECTOMY, LAPAROSCOPIC BILATERAL;  Surgeon: Antonella Lambert MD;  [facility];  Service: Gynecology   • [place] SUBOCCIPT DECOMP MEDULLA/SP CRD N/A 12/14/2016    Procedure: REPEAT SUBOCCIPITAL CRANIECTOMY; C1 LAMINECTOMY AND DURAPLASTY; PLACEMENT OF 4TH VENTRICULAR-SUBARACHNOID SHUNT; IMPULSE MONITORING;  Surgeon: Jorgito Bradley MD;  [facility];  Service: Neurosurgeryx3   • [place] TOTAL [address] ARTHROPLASTY, CERVICAL, SINGLE N/A 1/25/2019    Procedure: Anterior cervical discectomy and total disc arthroplasty C5-6;  Surgeon: Jorgito Bradley MD;  [facility];  Service: Neurosurgery   • TUBAL LIGATION  06/2020       Family History   Problem Relation Age of Onset   • Other Mother         sleep apnea   • Hypertension Father    • Drug abuse Father    • Alcohol abuse Father    • No Known Problems Sister    • Asthma Son    • Heart disease Maternal Grandmother    • Diabetes Paternal Grandmother         type 2   • Arthritis Paternal Grandmother    • No Known Problems Daughter    • Stroke Maternal Grandfather    • Heart disease Maternal Grandfather    • Diabetes Paternal Grandfather         type 2   • Heart disease Paternal Grandfather    • No Known Problems Sister    • Heart disease Sister         failure   • Heart disease Maternal Aunt      I have reviewed and agree with the history as documented  E-Cigarette/Vaping   • E-Cigarette Use Never User      E-Cigarette/Vaping Substances   • Nicotine No    • THC No    • CBD No    • Flavoring No    • Other No    • Unknown No      Social History     Tobacco Use   • Smoking status: Never Smoker   • Smokeless tobacco: Never Used   Vaping Use   • Vaping Use: Never used   Substance Use Topics   • Alcohol use: Yes     Comment: occasionally   • Drug use: No       Review of Systems   Constitutional: Negative for activity change, chills and fever  HENT: Negative for congestion and rhinorrhea  Eyes: Negative for photophobia and visual disturbance  Respiratory: Negative for cough, chest tightness and shortness of breath  Cardiovascular: Negative for chest pain and leg swelling  Gastrointestinal: Negative for abdominal distention, nausea and vomiting  Genitourinary: Negative for dysuria and frequency  Musculoskeletal: Negative for back pain and neck stiffness  Skin: Negative for rash and wound  Neurological: Negative for dizziness and weakness  Psychiatric/Behavioral: Negative for agitation and suicidal ideas  Physical Exam  Physical Exam  Vitals and nursing note reviewed  Constitutional:       General: She is not in acute distress  Appearance: Normal appearance  She is normal weight   She is not ill-appearing  HENT:      Head: Normocephalic and atraumatic  Right Ear: External ear normal       Left Ear: External ear normal       Nose: Nose normal  No congestion or rhinorrhea  Mouth/Throat:      Mouth: Mucous membranes are moist       Pharynx: Oropharynx is clear  No oropharyngeal exudate  Eyes:      General: No scleral icterus  Conjunctiva/sclera: Conjunctivae normal    Cardiovascular:      Rate and Rhythm: Normal rate and regular rhythm  Pulses: Normal pulses  Heart sounds: Normal heart sounds  No murmur heard  No gallop  Pulmonary:      Effort: Pulmonary effort is normal  No respiratory distress  Breath sounds: Normal breath sounds  No stridor  No wheezing or rhonchi  Abdominal:      General: Abdomen is flat  Bowel sounds are normal  There is no distension  Palpations: Abdomen is soft  There is no mass  Tenderness: There is abdominal tenderness (Right inguinal, suprapubic)  There is no right CVA tenderness or left CVA tenderness  Musculoskeletal:         General: No swelling or tenderness  Normal range of motion  Cervical back: Normal range of motion and neck supple  No tenderness  Right lower leg: No edema  Left lower leg: No edema  Skin:     General: Skin is warm and dry  Capillary Refill: Capillary refill takes less than 2 seconds  Coloration: Skin is not jaundiced  Findings: No bruising  Neurological:      General: No focal deficit present  Mental Status: She is alert and oriented to person, place, and time  Mental status is at baseline  Sensory: No sensory deficit  Motor: No weakness  Psychiatric:         Mood and Affect: Mood normal          Behavior: Behavior normal          Thought Content:  Thought content normal          Judgment: Judgment normal          Vital Signs  ED Triage Vitals [11/06/22 1745]   Temperature Pulse Respirations Blood Pressure SpO2   98 4 °F (36 9 °C) 78 18 132/74 98 % Temp Source Heart Rate Source Patient Position - Orthostatic VS BP Location FiO2 (%)   Oral Monitor Sitting Right arm --      Pain Score       --           Vitals:    11/06/22 1745   BP: 132/74   Pulse: 78   Patient Position - Orthostatic VS: Sitting         Visual Acuity      ED Medications  Medications - No data to display    Diagnostic Studies  Results Reviewed     Procedure Component Value Units Date/Time    CBC and differential [[id number]]     Lab Status: No result Specimen: Blood     Comprehensive metabolic panel [[id number]]     Lab Status: No result Specimen: Blood     UA w Reflex to Microscopic w Reflex to Culture [[id number]]     Lab Status: No result Specimen: Urine     Lipase [[id number]]     Lab Status: No result Specimen: Blood                  No orders to display              Procedures  POC Biliary US    Date/Time: 11/6/2022 6:17 PM  Performed by: Edwardo Wiley MD  Authorized by: Edwardo Wiley MD     Patient location:  ED  Performed by: Attending  Procedure details:     Exam Type:  Diagnostic    Indications: upper right quadrant abdominal pain and epigastric pain      Assessment for:  Cholecystitis and cholelithiasis    Views obtained: gallbladder (transverse and longitudinal), liver, common bile duct and portal triad      Image quality: diagnostic      Image availability:  Images available in PACS and video obtained  Findings:     Cholelithiasis: not identified      Common bile duct:  Normal    Common bile duct diameter (mm):  2    Gallbladder wall thickness (mm):  13    Pericholecystic fluid: not identified      Sonographic Rosario's sign: negative    Interpretation:     Biliary ultrasound impressions: normal gallbladder ultrasound    POC Renal US    Date/Time: 11/6/2022 6:18 PM  Performed by: Edwardo Wiley MD  Authorized by: Edwardo Wiley MD     Patient location:  ED  Performed by:   Attending  Procedure details:     Exam Type:  Diagnostic    Indications: flank/back pain and urinary retention      Views obtained: bladder (transverse and sagittal), left kidney and right kidney      Image quality: diagnostic      Image availability:  Images available in PACS and video obtained  Findings:     LEFT kidney findings: unremarkable      LEFT hydronephrosis: none      RIGHT kidney findings: unremarkable      RIGHT hydronephrosis: none      Bladder:  Visualized  Interpretation:     Renal ultrasound impressions: normal exam               ED Course  ED Course as of 11/06/22 2138   [facility] Nov 06, 2022   1752 Patient with history of bilateral salpingectomy  MDM  Number of Diagnoses or Management Options  UTI (urinary tract infection)  Diagnosis management comments: This is a 49-year-old female presented to the ED today for right flank pain  She has not noticed any other associated symptoms, and has not noticed any exacerbating or alleviating factors  She does not describe it very well but does state that it is an ache in quality  Her physical exam is remarkable for some right inguinal tenderness as well as suprapubic tenderness to palpation  Her differential diagnosis includes:  Urinary tract infection versus ascending urinary tract infection versus nephro/ureterolithiasis versus infected stone versus other intra-abdominal pathology  Patient had a CBC, metabolic panel, CT of her abdomen pelvis showing no significant abnormalities  Her urine showed evidence for infection  With her having flank pain associated with the positive urine, patient was seem to have pyelonephritis, which treated for that with Cipro  She has had an allergy previously to cephalosporins, and our local Bactrim resistance is high  Patient was given strict return precautions with which she agreed to comply  She was discharged home in stable condition and felt safe going home        Disposition  Final diagnoses:   None     ED Disposition     None      Follow-up Information    None Patient's Medications    No medications on file       No discharge procedures on file      PDMP Review       Value Time User    PDMP Reviewed  Yes 5/29/2020  1:25 PM Shaye Birmingham MD          ED Provider  Electronically Signed by           Ruma Gallardo MD  11/06/22 5428

## 2022-11-07 NOTE — DISCHARGE INSTRUCTIONS
You were seen in the ED for abdominal pain  You had blood work, urine studies, CT scan of your abdomen  You were diagnosed with a urinary tract infection  You have been discharged with a prescription for Cipro to be taken twice daily for the next 7 days  Please follow up with your primary care physician within the next 1 week for continued management of your conditions  Please come back to the ED if you develop uncontrollable pain, nausea vomiting, worsening fever, inability to eat or drink  Thank you very much for utilizing the ED this evening

## 2022-11-09 LAB — BACTERIA UR CULT: ABNORMAL

## 2022-12-17 ENCOUNTER — OFFICE VISIT (OUTPATIENT)
Dept: URGENT CARE | Facility: CLINIC | Age: 37
End: 2022-12-17

## 2022-12-17 VITALS
OXYGEN SATURATION: 98 % | RESPIRATION RATE: 18 BRPM | DIASTOLIC BLOOD PRESSURE: 77 MMHG | TEMPERATURE: 101.9 F | SYSTOLIC BLOOD PRESSURE: 115 MMHG | HEART RATE: 92 BPM

## 2022-12-17 DIAGNOSIS — J06.9 VIRAL UPPER RESPIRATORY TRACT INFECTION: Primary | ICD-10-CM

## 2022-12-17 DIAGNOSIS — J02.9 SORE THROAT: ICD-10-CM

## 2022-12-17 DIAGNOSIS — Z11.52 ENCOUNTER FOR SCREENING FOR COVID-19: ICD-10-CM

## 2022-12-17 LAB — S PYO AG THROAT QL: NEGATIVE

## 2022-12-17 NOTE — PATIENT INSTRUCTIONS
--Rest, drink plenty of fluids  --Rapid strep test negative    --COVID and flu testing sent  Will call with results  Average turn around time is 24-48 hours  --Advise self-quarantining until you hear back from us regarding test results (at the earliest)  This involves not leaving your house, wearing a mask at all times while outside your immediate living area, and disinfecting all commonly used surfaces and personal items  If your COVID test results are positive, you will need to self-quarantine at home for at least 5 days from the onset of your symptoms, until you are feeling better, and until you are without a fever for at least 24 hours  --For nasal/sinus congestion, helpful measures include steam, warm compresses, an OTC saline nasal spray or Neti pot, or an OTC decongestant (such as Sudafed)  The decongestant should be avoided, however, if you are under 10years of age, or have a history of high blood pressure or heart disease  In addition, an OTC nasal steroid (Flonase, Nasocort) or nasal decongestant (Afrin, Arnaldo-synephrine) may be taken  The nasal steroid should be used at bedtime, after the saline nasal spray  The nasal decongestant should not be taken more than 3 days consecutively in order to prevent rebound congestion  --For nasal drainage, postnasal drip, sneezing and itching, an OTC antihistamine (Allegra, Benadryl, etc) can be taken  --For cough, you can take an OTC expectorant such as plain Robitussion or Mucinex (active ingredient guaifenesin)  A spoonful of honey at bedtime may also be helpful, as may a prescription cough medicine  Also recommended is the use of a cool mist humidifier (with or without Vicks) in the bedroom at night  --For sore throat, you can take OTC lozenges, use warm gargles (salt water or apple cider vinegar and honey), herbal teas, or an OTC throat spray (Chloraseptic)  --You can take Tylenol or Motrin/Advil as needed for fever, headache, body aches  Motrin/Advil should be avoided, however, if you have a history of heart disease, bleeding ulcers, or if you take blood thinners  --You should contact your primary care provider and/or go to the ER if your symptoms are not improved or get worse over the next 7 days  This includes new onset fever, localized ear pain, sinus pain, as well as worsening cough, chest pain, shortness of breath, or significant weakness/fatigue

## 2022-12-17 NOTE — PROGRESS NOTES
[facility] [facility] [facility]        NAME: Mell Wynn is a 40 y o  female  : 1985    MRN: [id number]  DATE: 2022  TIME: 3:18 PM    Assessment and Plan   Viral upper respiratory tract infection [J06 9]  1  Viral upper respiratory tract infection        2  Sore throat  POCT rapid strepA      3  Encounter for screening for COVID-19  Covid19 and INFLUENZA A/B PCR            Patient Instructions     --Rest, drink plenty of fluids  --Rapid strep test negative    --COVID and flu testing sent  Will call with results  Average turn around time is 24-48 hours  --Advise self-quarantining until you hear back from us regarding test results (at the earliest)  This involves not leaving your house, wearing a mask at all times while outside your immediate living area, and disinfecting all commonly used surfaces and personal items  If your COVID test results are positive, you will need to self-quarantine at home for at least 5 days from the onset of your symptoms, until you are feeling better, and until you are without a fever for at least 24 hours  --For nasal/sinus congestion, helpful measures include steam, warm compresses, an OTC saline nasal spray or Neti pot, or an OTC decongestant (such as Sudafed)  The decongestant should be avoided, however, if you are under 10years of age, or have a history of high blood pressure or heart disease  In addition, an OTC nasal steroid (Flonase, Nasocort) or nasal decongestant (Afrin, Arnaldo-synephrine) may be taken  The nasal steroid should be used at bedtime, after the saline nasal spray  The nasal decongestant should not be taken more than 3 days consecutively in order to prevent rebound congestion  --For nasal drainage, postnasal drip, sneezing and itching, an OTC antihistamine (Allegra, Benadryl, etc) can be taken  --For cough, you can take an OTC expectorant such as plain Robitussion or Mucinex (active ingredient guaifenesin)    A spoonful of honey at bedtime may also be helpful, as may a prescription cough medicine  Also recommended is the use of a cool mist humidifier (with or without Vicks) in the bedroom at night  --For sore throat, you can take OTC lozenges, use warm gargles (salt water or apple cider vinegar and honey), herbal teas, or an OTC throat spray (Chloraseptic)  --You can take Tylenol or Motrin/Advil as needed for fever, headache, body aches  Motrin/Advil should be avoided, however, if you have a history of heart disease, bleeding ulcers, or if you take blood thinners  --You should contact your primary care provider and/or go to the ER if your symptoms are not improved or get worse over the next 7 days  This includes new onset fever, localized ear pain, sinus pain, as well as worsening cough, chest pain, shortness of breath, or significant weakness/fatigue  Chief Complaint     Chief Complaint   Patient presents with   • Sore Throat     Pt presents with sore throat since yesterday, tried Nyquil  History of Present Illness       Here with complaints of sore throat, nasal congestion, rhinorrhea, headache, "light" cough, fever x 2 days  Temp 103 yesterday  Some ear congestion  No abdominal pain, N/V/D  No dyspnea  No body aches  Taking Nyquil  No known sick contacts  Fever - 9 weeks to 74 years   This is a new problem  The current episode started yesterday  The problem occurs constantly  The problem has been gradually worsening  The maximum temperature noted was 103 to 103 9 F  The temperature was taken using an oral thermometer  Associated symptoms include abdominal pain, congestion, coughing, ear pain, headaches, muscle aches, sleepiness and a sore throat  Review of Systems   Review of Systems   Constitutional: Positive for fever  HENT: Positive for congestion, ear pain and sore throat  Respiratory: Positive for cough  Gastrointestinal: Positive for abdominal pain  Neurological: Positive for headaches  Current Medications     No current outpatient medications on file      Current Allergies     Allergies as of 12/17/2022 - Reviewed 12/17/2022   Allergen Reaction Noted   • Cephalexin  01/03/2017   • Sunscreens Hives 12/16/2016            The following portions of the patient's history were reviewed and updated as appropriate: allergies, current medications, past family history, past medical history, past social history, past surgical history and problem list      Past Medical History:   Diagnosis Date   • Abnormal Pap smear of cervix    • Chiari I malformation ([facility] [address] )    • Kidney stone    • UTI (urinary tract infection)     treated with antibiotics recently will have ua repeated    • Varicella     childhood       Past Surgical History:   Procedure Laterality Date   • CERVICAL BIOPSY  W/ LOOP ELECTRODE EXCISION     • COLPOSCOPY     • WI CONIZATION CERVIX,LOOP ELECTRD N/A 5/29/2020    Procedure: BIOPSY LEEP CERVIX;  Surgeon: Kunal Mondragon MD;  [facility];  Service: Gynecology   • WI LAP,RMV  ADNEXAL STRUCTURE Bilateral 5/29/2020    Procedure: SALPINGECTOMY, LAPAROSCOPIC BILATERAL;  Surgeon: Kunal Mondragon MD;  [facility];  Service: Gynecology   • WI SUBOCCIPT DECOMP MEDULLA/SP CRD N/A 12/14/2016    Procedure: REPEAT SUBOCCIPITAL CRANIECTOMY; C1 LAMINECTOMY AND DURAPLASTY; PLACEMENT OF 4TH VENTRICULAR-SUBARACHNOID SHUNT; IMPULSE MONITORING;  Surgeon: Roda Eisenmenger, MD;  [facility];  Service: Neurosurgeryx3   • WI TOTAL [address] ARTHROPLASTY, CERVICAL, SINGLE N/A 1/25/2019    Procedure: Anterior cervical discectomy and total disc arthroplasty C5-6;  Surgeon: Roda Eisenmenger, MD;  [facility];  Service: Neurosurgery   • TUBAL LIGATION  06/2020       Family History   Problem Relation Age of Onset   • Other Mother         sleep apnea   • Hypertension Father    • Drug abuse Father    • Alcohol abuse Father    • No Known Problems Sister    • Asthma Son    • Heart disease Maternal Grandmother    • Diabetes Paternal Grandmother         type 2   • Arthritis Paternal Grandmother    • No Known Problems Daughter    • Stroke Maternal Grandfather    • Heart disease Maternal Grandfather    • Diabetes Paternal Grandfather         type 2   • Heart disease Paternal Grandfather    • No Known Problems Sister    • Heart disease Sister         failure   • Heart disease Maternal Aunt          Medications have been verified  Objective   /77   Pulse 92   Temp (!) 101 9 °F (38 8 °C)   Resp 18   SpO2 98%   No LMP recorded  Physical Exam     Physical Exam  Constitutional:       General: She is not in acute distress  Appearance: Normal appearance  She is well-developed  She is not ill-appearing, toxic-appearing or diaphoretic  HENT:      Head: Normocephalic  Right Ear: Tympanic membrane, ear canal and external ear normal       Left Ear: Tympanic membrane, ear canal and external ear normal       Nose: Congestion and rhinorrhea present  Mouth/Throat:      Pharynx: Posterior oropharyngeal erythema present  Comments: Tonsils 1+, erythematous, without exudate  Cardiovascular:      Rate and Rhythm: Normal rate and regular rhythm  Heart sounds: Normal heart sounds  No murmur heard  Pulmonary:      Effort: Pulmonary effort is normal  No respiratory distress  Breath sounds: Normal breath sounds  No stridor  No wheezing, rhonchi or rales  Chest:      Chest wall: No tenderness  Abdominal:      Tenderness: There is no abdominal tenderness  Musculoskeletal:      Cervical back: Neck supple  Lymphadenopathy:      Cervical: No cervical adenopathy  Skin:     General: Skin is warm and dry  Neurological:      Mental Status: She is alert and oriented to person, place, and time  Deep Tendon Reflexes: Reflexes are normal and symmetric     Psychiatric:         Mood and Affect: Mood normal

## 2022-12-19 LAB
FLUAV RNA RESP QL NAA+PROBE: POSITIVE
FLUBV RNA RESP QL NAA+PROBE: NEGATIVE
SARS-COV-2 RNA RESP QL NAA+PROBE: NEGATIVE

## 2023-02-20 ENCOUNTER — HOSPITAL ENCOUNTER (OUTPATIENT)
Dept: RADIOLOGY | Facility: HOSPITAL | Age: 38
Discharge: HOME | End: 2023-02-20
Attending: NEUROLOGICAL SURGERY

## 2023-02-20 DIAGNOSIS — G95.0 SYRINGOMYELIA (HCC): ICD-10-CM

## 2023-02-20 DIAGNOSIS — Z86.69 HISTORY OF CHIARI MALFORMATION: ICD-10-CM

## 2023-02-23 ENCOUNTER — OFFICE VISIT (OUTPATIENT)
Dept: NEUROSURGERY | Facility: CLINIC | Age: 38
End: 2023-02-23

## 2023-02-23 VITALS
BODY MASS INDEX: 20.91 KG/M2 | DIASTOLIC BLOOD PRESSURE: 70 MMHG | RESPIRATION RATE: 16 BRPM | TEMPERATURE: 97.6 F | HEIGHT: 63 IN | HEART RATE: 68 BPM | WEIGHT: 118 LBS | SYSTOLIC BLOOD PRESSURE: 114 MMHG

## 2023-02-23 DIAGNOSIS — G95.0 SYRINGOMYELIA (HCC): Primary | ICD-10-CM

## 2023-02-23 NOTE — PROGRESS NOTES
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 decompression  The syrinx is unchanged in size patient does have symptoms related to this however I do not believe that she would benefit from surgical manipulation of the syrinx which would at any rate be very high risk for her  The canal is adequately decompressed  We will follow her along on a yearly basis    Return for follow-up after test       Diagnosis ICD-10-CM Associated Orders   1  Syringomyelia ([facility] [address] )  G95 0 MRI cervical spine without contrast           Chief Complaint   Patient presents with   • Follow-up     1 year f/u with MRI cervical 2/20/23 SL         HPI     This patient is known to our office for the following:   - Chiari surgery by Dr Concetta Blackwell at [facility] in 2011    - Repeat Suboccipital Craniectomy; C1 Laminectomy And Duraplasty; Placement Of 4th Ventricular-Subarachnoid Shunt; Impulse Monitoring on 12/14/16 DKO  - Anterior cervical discectomy and total disc arthroplasty C5-6 on 1/25/19 DKO  Last seen on 2/22/22 (DKO) and advised to f/u in 1 year with MRI of the cervical spine for h/o chiari malformation and syringomyelia  51-year-old female with syringomyelia which is unchanged and extend and is related to her Chiari malformation she does have numbness bilaterally in her hands and leg and daily headaches  She does have some neck pain which is posteriorly and radiates into her interscapular region and then into her arms  This is relatively unchanged  Review of Systems   Constitutional: Negative  HENT: Negative  Eyes: Negative  Respiratory: Negative  Cardiovascular: Negative  Gastrointestinal: Negative  Endocrine: Negative  Genitourinary: Negative  Musculoskeletal: Positive for neck pain (posterior neck pain radiating to shoulders and arms on both sides)  Skin: Negative  Allergic/Immunologic: Negative      Neurological: Positive for numbness (b/l hands and legs) and headaches (daily migraines)  Hematological: Negative  Psychiatric/Behavioral: Negative  All other systems reviewed and are negative  I reviewed the ROS    Vitals:    /70 (BP Location: Left arm, Patient Position: Sitting, Cuff Size: Adult)   Pulse 68   Temp 97 6 °F (36 4 °C) (Temporal)   Resp 16   Ht 5' 2 5" (1 588 m)   Wt 53 5 kg (118 lb)   LMP 01/29/2023   BMI 21 24 kg/m²     MEDICAL HISTORY  Past Medical History:   Diagnosis Date   • Abnormal Pap smear of cervix    • Chiari I malformation (HCC)    • Kidney stone    • UTI (urinary tract infection)     treated with antibiotics recently will have ua repeated    • Varicella     childhood     Past Surgical History:   Procedure Laterality Date   • CERVICAL BIOPSY  W/ LOOP ELECTRODE EXCISION     • COLPOSCOPY     • TN CONIZATION CERVIX W/WO D&C RPR ELTRD EXC N/A 5/29/2020    Procedure: BIOPSY LEEP CERVIX;  Surgeon: Mary Anne Castellano MD;  [facility];  Service: Gynecology   • TN CRNEC SUBOCCIPITAL CRV TA [address] N/A 12/14/2016    Procedure: REPEAT SUBOCCIPITAL CRANIECTOMY; C1 LAMINECTOMY AND DURAPLASTY; PLACEMENT OF 4TH VENTRICULAR-SUBARACHNOID SHUNT; IMPULSE MONITORING;  Surgeon: Phuong Barbosa MD;  [facility];  Service: Neurosurgeryx3   • TN LAPAROSCOPY W/RMVL ADNEXAL STRUCTURES Bilateral 5/29/2020    Procedure: SALPINGECTOMY, LAPAROSCOPIC BILATERAL;  Surgeon: Mary Anne Castellano MD;  [facility];  Service: Gynecology   • TN TOTAL [address] ARTHRP ANT SINGLE INTERSPACE CERVICAL N/A 1/25/2019    Procedure: Anterior cervical discectomy and total disc arthroplasty C5-6;  Surgeon: Phuong Barbosa MD;  [facility];  Service: Neurosurgery   • TUBAL LIGATION  06/2020     Social History     Tobacco Use   • Smoking status: Never   • Smokeless tobacco: Never   Vaping Use   • Vaping Use: Never used   Substance Use Topics   • Alcohol use: Yes     Comment: occasionally   • Drug use:  No Family History   Problem Relation Age of Onset   • Other Mother         sleep apnea   • Hypertension Father    • Drug abuse Father    • Alcohol abuse Father    • No Known Problems Sister    • Asthma Son    • Heart disease Maternal Grandmother    • Diabetes Paternal Grandmother         type 2   • Arthritis Paternal Grandmother    • No Known Problems Daughter    • Stroke Maternal Grandfather    • Heart disease Maternal Grandfather    • Diabetes Paternal Grandfather         type 2   • Heart disease Paternal Grandfather    • No Known Problems Sister    • Heart disease Sister         failure   • Heart disease Maternal Aunt       No current outpatient medications on file  Allergies   Allergen Reactions   • Cephalexin      Annotation - [id number]: gives hives   • Sunscreens Hives        The following portions of the patient's history were updated by MA and reviewed by MD: allergies, current medications, past family history, past medical history, past social history, past surgical history and problem list     Physical Exam  Vitals and nursing note reviewed  Constitutional:       General: She is not in acute distress  Appearance: Normal appearance  She is not ill-appearing, toxic-appearing or diaphoretic  HENT:      Head: Normocephalic  Nose: Nose normal    Eyes:      Extraocular Movements: Extraocular movements intact  Pupils: Pupils are equal, round, and reactive to light  Musculoskeletal:         General: No swelling, tenderness, deformity or signs of injury  Normal range of motion  Cervical back: Normal range of motion and neck supple  No rigidity  Right lower leg: No edema  Left lower leg: No edema  Lymphadenopathy:      Cervical: No cervical adenopathy  Skin:     General: Skin is warm and dry  Coloration: Skin is not jaundiced  Findings: No erythema or rash  Neurological:      Mental Status: She is alert and oriented to person, place, and time   Mental status is at baseline  Cranial Nerves: No cranial nerve deficit  Sensory: No sensory deficit  Motor: No weakness  Coordination: Coordination normal       Gait: Gait normal       Deep Tendon Reflexes: Reflexes abnormal ( I DTRs in the lower extremity)  Psychiatric:         Mood and Affect: Mood normal          Behavior: Behavior normal          Thought Content: Thought content normal          Judgment: Judgment normal          RESULTS/DATA  I have personally reviewed pertinent films in PACS   MRI of the cervical spine is carefully reviewed and compared with studies from 2022 as well as 2018  This demonstrates no change in the size of the syrinx cavity  There is adequate decompression of the Chiari malformation  There is no pseudomeningocele present    There is the abnormal artifact of the arthroplasty noted which in fact does not protrude posterior to the posterior vertebral body line

## 2023-06-24 ENCOUNTER — APPOINTMENT (EMERGENCY)
Dept: RADIOLOGY | Facility: HOSPITAL | Age: 38
End: 2023-06-24
Payer: COMMERCIAL

## 2023-06-24 ENCOUNTER — HOSPITAL ENCOUNTER (EMERGENCY)
Facility: HOSPITAL | Age: 38
Discharge: HOME | End: 2023-06-24
Attending: EMERGENCY MEDICINE
Payer: COMMERCIAL

## 2023-06-24 VITALS
TEMPERATURE: 98.7 F | HEART RATE: 85 BPM | SYSTOLIC BLOOD PRESSURE: 122 MMHG | OXYGEN SATURATION: 96 % | RESPIRATION RATE: 18 BRPM | DIASTOLIC BLOOD PRESSURE: 72 MMHG

## 2023-06-24 DIAGNOSIS — M54.2 NECK PAIN: ICD-10-CM
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
## 2023-06-24 DIAGNOSIS — R07.89 CHEST WALL PAIN: ICD-10-CM

## 2023-06-24 PROCEDURE — 93005 ELECTROCARDIOGRAM TRACING: CPT

## 2023-06-24 PROCEDURE — 99284 EMERGENCY DEPT VISIT MOD MDM: CPT

## 2023-06-24 PROCEDURE — 99285 EMERGENCY DEPT VISIT HI MDM: CPT | Performed by: EMERGENCY MEDICINE

## 2023-06-24 PROCEDURE — 71046 X-RAY EXAM CHEST 2 VIEWS: CPT

## 2023-06-24 PROCEDURE — 72040 X-RAY EXAM NECK SPINE 2-3 VW: CPT

## 2023-06-24 RX ORDER — NAPROXEN 500 MG/1
500 TABLET ORAL ONCE
Status: COMPLETED | OUTPATIENT
Start: 2023-06-24 | End: 2023-06-24

## 2023-06-24 RX ORDER — ACETAMINOPHEN 325 MG/1
975 TABLET ORAL ONCE
Status: COMPLETED | OUTPATIENT
Start: 2023-06-24 | End: 2023-06-24

## 2023-06-24 RX ADMIN — NAPROXEN 500 MG: 500 TABLET ORAL at 17:26

## 2023-06-24 RX ADMIN — ACETAMINOPHEN 975 MG: 325 TABLET ORAL at 17:26

## 2023-06-24 NOTE — Clinical Note
Rere Seth was seen and treated in our emergency department on 6/24/2023  Diagnosis:     Ary  may return to work on return date  She may return on this date: 06/26/2023         If you have any questions or concerns, please don't hesitate to call        Marvel Voss MD    ______________________________           _______________          _______________  [facility] [facility]                              Date                                Time

## 2023-06-24 NOTE — DISCHARGE INSTRUCTIONS
You can take 975 mg acetaminophen (brand name includes Tylenol) and 400 mg ibuprofen (brand names include Advil or Motrin) every 6 hours for pain/fever
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
## 2023-06-25 LAB
ATRIAL RATE: 74 BPM
P AXIS: 80 DEGREES
PR INTERVAL: 116 MS
QRS AXIS: 83 DEGREES
QRSD INTERVAL: 74 MS
QT INTERVAL: 374 MS
QTC INTERVAL: 415 MS
T WAVE AXIS: 68 DEGREES
VENTRICULAR RATE: 74 BPM

## 2023-06-25 PROCEDURE — 93010 ELECTROCARDIOGRAM REPORT: CPT | Performed by: INTERNAL MEDICINE

## 2023-06-27 ENCOUNTER — TELEPHONE (OUTPATIENT)
Dept: NEUROSURGERY | Facility: CLINIC | Age: 38
End: 2023-06-27

## 2023-06-27 ENCOUNTER — HOSPITAL ENCOUNTER (EMERGENCY)
Facility: HOSPITAL | Age: 38
Discharge: HOME | End: 2023-06-27
Attending: EMERGENCY MEDICINE
Payer: COMMERCIAL

## 2023-06-27 ENCOUNTER — APPOINTMENT (EMERGENCY)
Dept: RADIOLOGY | Facility: HOSPITAL | Age: 38
End: 2023-06-27
Payer: COMMERCIAL

## 2023-06-27 VITALS
SYSTOLIC BLOOD PRESSURE: 124 MMHG | HEART RATE: 74 BPM | RESPIRATION RATE: 18 BRPM | TEMPERATURE: 96.7 F | OXYGEN SATURATION: 99 % | DIASTOLIC BLOOD PRESSURE: 70 MMHG
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
## 2023-06-27 DIAGNOSIS — R51.9 HEADACHE: ICD-10-CM

## 2023-06-27 DIAGNOSIS — M54.2 NECK PAIN: Primary | ICD-10-CM

## 2023-06-27 DIAGNOSIS — M54.13 RADICULOPATHY OF CERVICOTHORACIC REGION: ICD-10-CM

## 2023-06-27 DIAGNOSIS — S16.1XXA STRAIN OF NECK MUSCLE, INITIAL ENCOUNTER: Primary | ICD-10-CM

## 2023-06-27 DIAGNOSIS — Z86.69 HISTORY OF CHIARI MALFORMATION: ICD-10-CM

## 2023-06-27 LAB
ANION GAP SERPL CALCULATED.3IONS-SCNC: 3 MMOL/L
BASOPHILS # BLD AUTO: 0.03 THOUSANDS/ÂΜL (ref 0–0.1)
BASOPHILS NFR BLD AUTO: 1 % (ref 0–1)
BUN SERPL-MCNC: 20 MG/DL (ref 5–25)
CALCIUM SERPL-MCNC: 8.3 MG/DL (ref 8.3–10.1)
CHLORIDE SERPL-SCNC: 111 MMOL/L (ref 96–108)
CO2 SERPL-SCNC: 26 MMOL/L (ref 21–32)
CREAT SERPL-MCNC: 0.99 MG/DL (ref 0.6–1.3)
EOSINOPHIL # BLD AUTO: 0.21 THOUSAND/ÂΜL (ref 0–0.61)
EOSINOPHIL NFR BLD AUTO: 4 % (ref 0–6)
ERYTHROCYTE [DISTWIDTH] IN BLOOD BY AUTOMATED COUNT: 11.6 % (ref 11.6–15.1)
GFR SERPL CREATININE-BSD FRML MDRD: 73 ML/MIN/1.73SQ M
GLUCOSE SERPL-MCNC: 89 MG/DL (ref 65–140)
HCT VFR BLD AUTO: 40.1 % (ref 34.8–46.1)
HGB BLD-MCNC: 14 G/DL (ref 11.5–15.4)
IMM GRANULOCYTES # BLD AUTO: 0.01 THOUSAND/UL (ref 0–0.2)
IMM GRANULOCYTES NFR BLD AUTO: 0 % (ref 0–2)
LYMPHOCYTES # BLD AUTO: 1.52 THOUSANDS/ÂΜL (ref 0.6–4.47)
LYMPHOCYTES NFR BLD AUTO: 27 % (ref 14–44)
MCH RBC QN AUTO: 32 PG (ref 26.8–34.3)
MCHC RBC AUTO-ENTMCNC: 34.9 G/DL (ref 31.4–37.4)
MCV RBC AUTO: 92 FL (ref 82–98)
MONOCYTES # BLD AUTO: 0.29 THOUSAND/ÂΜL (ref 0.17–1.22)
MONOCYTES NFR BLD AUTO: 5 % (ref 4–12)
NEUTROPHILS # BLD AUTO: 3.55 THOUSANDS/ÂΜL (ref 1.85–7.62)
NEUTS SEG NFR BLD AUTO: 63 % (ref 43–75)
NRBC BLD AUTO-RTO: 0 /100 WBCS
PLATELET # BLD AUTO: 216 THOUSANDS/UL (ref 149–390)
PMV BLD AUTO: 10 FL (ref 8.9–12.7)
POTASSIUM SERPL-SCNC: 3.5 MMOL/L (ref 3.5–5.3)
RBC # BLD AUTO: 4.38 MILLION/UL (ref 3.81–5.12)
SODIUM SERPL-SCNC: 140 MMOL/L (ref 135–147)
WBC # BLD AUTO: 5.61 THOUSAND/UL (ref 4.31–10.16)

## 2023-06-27 PROCEDURE — 70496 CT ANGIOGRAPHY HEAD: CPT

## 2023-06-27 PROCEDURE — 85025 COMPLETE CBC W/AUTO DIFF WBC: CPT

## 2023-06-27 PROCEDURE — 80048 BASIC METABOLIC PNL TOTAL CA: CPT

## 2023-06-27 PROCEDURE — 36415 COLL VENOUS BLD VENIPUNCTURE: CPT

## 2023-06-27 PROCEDURE — 70498 CT ANGIOGRAPHY NECK: CPT

## 2023-06-27 PROCEDURE — G1004 CDSM NDSC: HCPCS

## 2023-06-27 RX ORDER — KETOROLAC TROMETHAMINE 30 MG/ML
15 INJECTION, SOLUTION INTRAMUSCULAR; INTRAVENOUS ONCE
Status: COMPLETED | OUTPATIENT
Start: 2023-06-27 | End: 2023-06-27

## 2023-06-27 RX ADMIN — KETOROLAC TROMETHAMINE 15 MG: 30 INJECTION, SOLUTION INTRAMUSCULAR at 20:00

## 2023-06-27 RX ADMIN — IOHEXOL 90 ML: 350 INJECTION, SOLUTION INTRAVENOUS at 19:40

## 2023-06-27 NOTE — TELEPHONE ENCOUNTER
Received the following response from Dr Dev Persaud:     Sariah Brown MD  to Me        6/27/23  2:41 PM  Did she have a CT head with out and a c-spine with FE   If not then these should be ordered     Placed orders for requesting imaging  Upon speaking with Dr Dev Persaud directly and discussion patients complaints was determined that she should be evaluated in the ER as she may be experiencing delayed symptoms that require more urgent evaluation than can be handled in the OP setting  Ama Mcallister to advise of this direction she stated an understanding and was appreciative
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
## 2023-06-28 NOTE — DISCHARGE INSTRUCTIONS
Your workup here was not concerning for anything dangerous  Therefore there is no need for you to stay at the hospital for further testing  We feel safe to send you home  You can use Tylenol, Naprosyn for management of your symptoms  You should follow up with your neurosurgeon to assess for resolution of your symptoms and to determine if there is any further evaluation that needs to be performed  Return to the emergency department if you have any symptoms of worsening pain or numbness    These are your CT findings to follow up on:    New tiny hypodensity in anterior limb of right internal capsule, may represent age-indeterminate infarct or chronic small vessel ischemic change  Consider follow-up MRI brain without contrast for further evaluation  No acute intracranial hemorrhage  Stable postsurgical changes changes of suboccipital decompressive craniotomy, C1 laminectomy, partial C2 laminectomy with duraplasty for Chiari I malformation decompression and unchanged position of fourth ventricular to upper cervical spine subarachnoid   shunt  Negative CTA head and neck for large vessel occlusion, dissection, aneurysm, or high-grade stenosis  C5-C6 total disc replacement  No acute hardware complication  Thank you for choosing [address] [place] [address] for your care!

## 2023-06-29 NOTE — TELEPHONE ENCOUNTER
Call received from Ary stating she went to the ER as discussed and preformed CTA of her head a neck  She states that based on the findings they have recommended an MRI  Will route message to provider for input and contact her back to advise of next steps

## 2023-06-30 NOTE — TELEPHONE ENCOUNTER
Received the following response from Dr Carmen Gonzalez:      June 30, 2023  Louisa Pritchard MD  to Me        6/30/23  8:38 AM  I agree with an MRI of the brain without contrast   Should include diffusion sequence       Placed order for MRI brain diffusion sequence and contacted Ary to advise of its need  She will call central scheduling to arrange this  Once the results have returned I will discuss with provider to determine next steps  She was appreciative
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
## 2023-07-13 ENCOUNTER — HOSPITAL ENCOUNTER (OUTPATIENT)
Dept: RADIOLOGY | Facility: HOSPITAL | Age: 38
Discharge: HOME | End: 2023-07-13
Attending: NEUROLOGICAL SURGERY
Payer: COMMERCIAL

## 2023-07-13 DIAGNOSIS — Z86.69 HISTORY OF CHIARI MALFORMATION: ICD-10-CM

## 2023-07-13 DIAGNOSIS — R51.9 HEADACHE: ICD-10-CM
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
## 2023-07-13 PROCEDURE — 70551 MRI BRAIN STEM W/O DYE: CPT

## 2023-07-13 PROCEDURE — G1004 CDSM NDSC: HCPCS

## 2023-08-07 ENCOUNTER — TELEPHONE (OUTPATIENT)
Dept: NEUROSURGERY | Facility: CLINIC | Age: 38
End: 2023-08-07

## 2023-08-07 NOTE — TELEPHONE ENCOUNTER
8/7/23- ON 2/23/23, CHECKED PT OUT. SCHEDULED PT FOR 5YR RECALL AS MRI SCRIPT DATE EXPECTED IS 2028.  PER CRISTOBAL'S NOTE, STATES YEARLY F/U. DOUBLE CHECKED WITH MA-HECTOR/CRISTOBAL, PER MA AND CRISTOBAL--5 YEAR F/U FOR CSPINE IS CORRECT

## 2023-11-06 ENCOUNTER — OFFICE VISIT (OUTPATIENT)
Dept: URGENT CARE | Facility: MEDICAL CENTER | Age: 38
End: 2023-11-06
Payer: COMMERCIAL

## 2023-11-06 VITALS
OXYGEN SATURATION: 100 % | RESPIRATION RATE: 18 BRPM | SYSTOLIC BLOOD PRESSURE: 112 MMHG | HEART RATE: 71 BPM | DIASTOLIC BLOOD PRESSURE: 56 MMHG | TEMPERATURE: 99.2 F | BODY MASS INDEX: 19.84 KG/M2 | HEIGHT: 63 IN | WEIGHT: 112 LBS

## 2023-11-06 DIAGNOSIS — J06.0 ACUTE LARYNGOPHARYNGITIS: ICD-10-CM

## 2023-11-06 DIAGNOSIS — N30.01 ACUTE CYSTITIS WITH HEMATURIA: Primary | ICD-10-CM

## 2023-11-06 LAB
SL AMB  POCT GLUCOSE, UA: ABNORMAL
SL AMB LEUKOCYTE ESTERASE,UA: ABNORMAL
SL AMB POCT BILIRUBIN,UA: ABNORMAL
SL AMB POCT BLOOD,UA: ABNORMAL
SL AMB POCT CLARITY,UA: ABNORMAL
SL AMB POCT COLOR,UA: YELLOW
SL AMB POCT KETONES,UA: 40
SL AMB POCT NITRITE,UA: ABNORMAL
SL AMB POCT PH,UA: 7.5
SL AMB POCT SPECIFIC GRAVITY,UA: 1.01
SL AMB POCT URINE PROTEIN: 100
SL AMB POCT UROBILINOGEN: 0.2

## 2023-11-06 PROCEDURE — 87086 URINE CULTURE/COLONY COUNT: CPT | Performed by: PHYSICIAN ASSISTANT

## 2023-11-06 PROCEDURE — 87186 SC STD MICRODIL/AGAR DIL: CPT | Performed by: PHYSICIAN ASSISTANT

## 2023-11-06 PROCEDURE — 81002 URINALYSIS NONAUTO W/O SCOPE: CPT | Performed by: PHYSICIAN ASSISTANT

## 2023-11-06 PROCEDURE — 99213 OFFICE O/P EST LOW 20 MIN: CPT | Performed by: PHYSICIAN ASSISTANT

## 2023-11-06 PROCEDURE — 87077 CULTURE AEROBIC IDENTIFY: CPT | Performed by: PHYSICIAN ASSISTANT

## 2023-11-06 RX ORDER — NITROFURANTOIN 25; 75 MG/1; MG/1
100 CAPSULE ORAL 2 TIMES DAILY
Qty: 10 CAPSULE | Refills: 0 | Status: SHIPPED | OUTPATIENT
Start: 2023-11-06 | End: 2023-11-11

## 2023-11-06 NOTE — LETTER
November 6, 2023     Patient: Mayco Up   YOB: 1985   Date of Visit: 11/6/2023       To Whom It May Concern: It is my medical opinion that Mayco Up should be excused for her absence from work through 11/7/2023. If you have any questions or concerns, please don't hesitate to call.          Sincerely,        Jonathan Vora PA-C    CC: No Recipients

## 2023-11-06 NOTE — PROGRESS NOTES
[facility]        NAME: Tressa Bishop is a 45 y.o. female  : 1985    MRN: [id number]  DATE: 2023  TIME: 6:52 PM    Assessment and Plan   Acute cystitis with hematuria [N30.01]  1. Acute cystitis with hematuria  POCT urine dip    Urine culture    nitrofurantoin (MACROBID) 100 mg capsule      2. Acute laryngopharyngitis              Patient Instructions     1. Increase oral fluid consumption. 2. Over-the-counter ibuprofen and/or acetaminophen as needed for pain. 3. Return here or go to the ER for any worsening symptoms. 4. Follow-up with primary care for any symptoms lasting longer than 5 days. Chief Complaint     Chief Complaint   Patient presents with    Vomiting     X4 days nausea, vomiting, cough, sore throat, headache     Urinary Frequency     Urinary frequency         History of Present Illness       69-year-old female patient presents with 2 separate complaints. Firstly, patient has been complaining of dysuria, urinary frequency and urgency for the past week. Also, approximately 5 days ago patient began with sore throat, hoarseness, nasal congestion, cough. With the symptoms she had also developed nausea, vomiting, diarrhea. Diarrhea has begun to improved and patient states she has not vomited since yesterday morning. Vomiting   Associated symptoms include coughing and diarrhea. Pertinent negatives include no abdominal pain, arthralgias, chest pain, chills or fever. Urinary Frequency   Associated symptoms include frequency, nausea, urgency and vomiting. Pertinent negatives include no chills or hematuria. Review of Systems   Review of Systems   Constitutional:  Negative for chills and fever. HENT:  Positive for congestion, sore throat and voice change. Negative for ear pain. Eyes:  Negative for pain and visual disturbance. Respiratory:  Positive for cough. Negative for shortness of breath.     Cardiovascular:  Negative for chest pain and palpitations. Gastrointestinal:  Positive for diarrhea, nausea and vomiting. Negative for abdominal pain. Genitourinary:  Positive for dysuria, frequency and urgency. Negative for hematuria. Musculoskeletal:  Negative for arthralgias and back pain. Skin:  Negative for color change and rash. Neurological:  Negative for seizures and syncope. All other systems reviewed and are negative.         Current Medications       Current Outpatient Medications:     nitrofurantoin (MACROBID) 100 mg capsule, Take 1 capsule (100 mg total) by mouth 2 (two) times a day for 5 days, Disp: 10 capsule, Rfl: 0    Current Allergies     Allergies as of 11/06/2023 - Reviewed 11/06/2023   Allergen Reaction Noted    Cephalexin  01/03/2017    Sunscreens Hives 12/16/2016            The following portions of the patient's history were reviewed and updated as appropriate: allergies, current medications, past family history, past medical history, past social history, past surgical history and problem list.     Past Medical History:   Diagnosis Date    Abnormal Pap smear of cervix     Chiari I malformation ([address])     Kidney stone     UTI (urinary tract infection)     treated with antibiotics recently will have ua repeated     Varicella     childhood       Past Surgical History:   Procedure Laterality Date    CERVICAL BIOPSY  W/ LOOP ELECTRODE EXCISION      COLPOSCOPY      KS CONIZATION CERVIX W/WO D&C RPR ELTRD EXC N/A 5/29/2020    Procedure: BIOPSY LEEP CERVIX;  Surgeon: Montse Pinzon MD;  Location: [facility];  Service: Gynecology    KS CRNEC SUBOCCIPITAL CRV TA [address] N/A 12/14/2016    Procedure: REPEAT SUBOCCIPITAL CRANIECTOMY; C1 LAMINECTOMY AND DURAPLASTY; PLACEMENT OF 4TH VENTRICULAR-SUBARACHNOID SHUNT; IMPULSE MONITORING;  Surgeon: Clara Dewey MD;  Location: [facility];  Service: Neurosurgeryx3    KS LAPAROSCOPY W/RMVL ADNEXAL STRUCTURES Bilateral 5/29/2020    Procedure: SALPINGECTOMY, LAPAROSCOPIC BILATERAL;  Surgeon: Dorys Salvador MD;  Location: [facility];  Service: Gynecology    FL TOTAL [address] [facility] SINGLE INTERSPACE CERVICAL N/A 1/25/2019    Procedure: Anterior cervical discectomy and total disc arthroplasty C5-6;  Surgeon: Coleen Baldwin MD;  Location: [facility];  Service: Neurosurgery    TUBAL LIGATION  06/2020       Family History   Problem Relation Age of Onset    Other Mother         sleep apnea    Hypertension Father     Drug abuse Father     Alcohol abuse Father     No Known Problems Sister     Asthma Son     Heart disease Maternal Grandmother     Diabetes Paternal Grandmother         type 2    Arthritis Paternal Grandmother     No Known Problems Daughter     Stroke Maternal Grandfather     Heart disease Maternal Grandfather     Diabetes Paternal Grandfather         type 2    Heart disease Paternal Grandfather     No Known Problems Sister     Heart disease Sister         failure    Heart disease Maternal Aunt          Medications have been verified. Objective   /56   Pulse 71   Temp 99.2 °F (37.3 °C) (Oral)   Resp 18   Ht 5' 2.5" (1.588 m)   Wt 50.8 kg (112 lb) Comment: stated  SpO2 100%   BMI 20.16 kg/m²        Physical Exam     Physical Exam  Vitals and nursing note reviewed. Constitutional:       General: She is not in acute distress. Appearance: Normal appearance. She is not ill-appearing or toxic-appearing. HENT:      Head: Normocephalic. Right Ear: Tympanic membrane normal.      Left Ear: Tympanic membrane normal.      Nose: Congestion present. No rhinorrhea. Mouth/Throat:      Mouth: Mucous membranes are moist.      Pharynx: Posterior oropharyngeal erythema present. No oropharyngeal exudate. Comments: Patient hoarse on exam.  Eyes:      Conjunctiva/sclera: Conjunctivae normal.      Pupils: Pupils are equal, round, and reactive to light. Cardiovascular:      Rate and Rhythm: Normal rate and regular rhythm. Pulses: Normal pulses. Pulmonary:      Effort: Pulmonary effort is normal.      Breath sounds: Normal breath sounds. Abdominal:      Tenderness: There is no abdominal tenderness. There is no right CVA tenderness or left CVA tenderness. Musculoskeletal:         General: Normal range of motion. Cervical back: Normal range of motion. Lymphadenopathy:      Cervical: No cervical adenopathy. Skin:     General: Skin is warm and dry. Capillary Refill: Capillary refill takes less than 2 seconds. Neurological:      Mental Status: She is alert and oriented to person, place, and time.    Psychiatric:         Mood and Affect: Mood normal.         Behavior: Behavior normal.

## 2023-11-08 LAB — BACTERIA UR CULT: ABNORMAL

## 2024-03-30 ENCOUNTER — OFFICE VISIT (OUTPATIENT)
Dept: URGENT CARE | Facility: MEDICAL CENTER | Age: 39
End: 2024-03-30
Payer: COMMERCIAL

## 2024-03-30 VITALS
HEIGHT: 63 IN | WEIGHT: 112 LBS | SYSTOLIC BLOOD PRESSURE: 123 MMHG | RESPIRATION RATE: 18 BRPM | HEART RATE: 70 BPM | BODY MASS INDEX: 19.84 KG/M2 | OXYGEN SATURATION: 98 % | TEMPERATURE: 98.1 F | DIASTOLIC BLOOD PRESSURE: 75 MMHG

## 2024-03-30 DIAGNOSIS — J06.9 ACUTE URI: ICD-10-CM

## 2024-03-30 DIAGNOSIS — J02.9 SORE THROAT: Primary | ICD-10-CM

## 2024-03-30 DIAGNOSIS — J32.9 RHINOSINUSITIS: ICD-10-CM

## 2024-03-30 LAB — S PYO AG THROAT QL: NEGATIVE

## 2024-03-30 PROCEDURE — 87880 STREP A ASSAY W/OPTIC: CPT | Performed by: PHYSICIAN ASSISTANT

## 2024-03-30 PROCEDURE — 99213 OFFICE O/P EST LOW 20 MIN: CPT | Performed by: PHYSICIAN ASSISTANT

## 2024-03-30 RX ORDER — GUAIFENESIN, PSEUDOEPHEDRINE HYDROCHLORIDE 600; 60 MG/1; MG/1
1 TABLET, EXTENDED RELEASE ORAL EVERY 12 HOURS
Qty: 18 TABLET | Refills: 0 | Status: SHIPPED | OUTPATIENT
Start: 2024-03-30

## 2024-03-30 RX ORDER — AMOXICILLIN 500 MG/1
500 CAPSULE ORAL EVERY 12 HOURS SCHEDULED
Qty: 14 CAPSULE | Refills: 0 | Status: SHIPPED | OUTPATIENT
Start: 2024-03-30 | End: 2024-04-06

## 2024-03-30 NOTE — PROGRESS NOTES
[facility]        NAME: Ary Lemus is a 38 y.o. female  : 1985    MRN: [id number]  DATE: 2024  TIME: 10:42 AM    Assessment and Plan   Sore throat [J02.9]  1. Sore throat  POCT rapid ANTIGEN strepA      2. Acute URI  pseudoephedrine-guaifenesin (MUCINEX D)  MG per tablet      3. Rhinosinusitis  amoxicillin (AMOXIL) 500 mg capsule    pseudoephedrine-guaifenesin (MUCINEX D)  MG per tablet            Patient Instructions       Follow up with PCP in 3-5 days.  Proceed to  ER if symptoms worsen.    If tests are performed, our office will contact you with results only if changes need to made to the care plan discussed with you at the visit. You can review your full results on [facility].    Chief Complaint     Chief Complaint   Patient presents with    Sinusitis     Sinus pressure, facial pain, headache ongoing for 3 weeks     Sore Throat     Right sided sore throat; noticed today          History of Present Illness       Sinusitis  This is a new problem. The current episode started 1 to 4 weeks ago. The problem has been gradually worsening since onset. There has been no fever. The pain is moderate. Associated symptoms include congestion, coughing, headaches, sinus pressure and a sore throat. Pertinent negatives include no chills, ear pain or shortness of breath. Past treatments include nothing. The treatment provided no relief.       Review of Systems   Review of Systems   Constitutional:  Negative for activity change, appetite change, chills and fever.   HENT:  Positive for congestion, rhinorrhea, sinus pressure, sinus pain and sore throat. Negative for ear pain.    Respiratory:  Positive for cough. Negative for shortness of breath.    Gastrointestinal:  Negative for nausea and vomiting.   Neurological:  Positive for headaches. Negative for dizziness and numbness.         Current Medications       Current Outpatient Medications:     amoxicillin (AMOXIL) 500 mg  capsule, Take 1 capsule (500 mg total) by mouth every 12 (twelve) hours for 7 days, Disp: 14 capsule, Rfl: 0    pseudoephedrine-guaifenesin (MUCINEX D)  MG per tablet, Take 1 tablet by mouth every 12 (twelve) hours, Disp: 18 tablet, Rfl: 0    Current Allergies     Allergies as of 03/30/2024 - Reviewed 03/30/2024   Allergen Reaction Noted    Cephalexin  01/03/2017    Sunscreens Hives 12/16/2016            The following portions of the patient's history were reviewed and updated as appropriate: allergies, current medications, past family history, past medical history, past social history, past surgical history and problem list.     Past Medical History:   Diagnosis Date    Abnormal Pap smear of cervix     Chiari I malformation (HCC)     Kidney stone     UTI (urinary tract infection)     treated with antibiotics recently will have ua repeated     Varicella     childhood       Past Surgical History:   Procedure Laterality Date    CERVICAL BIOPSY  W/ LOOP ELECTRODE EXCISION      COLPOSCOPY      WA CONIZATION CERVIX W/WO D&C RPR ELTRD EXC N/A 5/29/2020    Procedure: BIOPSY LEEP CERVIX;  Surgeon: Stephanie Swanson MD;  [facility];  Service: Gynecology    WA CRNEC SUBOCCIPITAL CRV TA DCMPRN MEDULLA & CORD N/A 12/14/2016    Procedure: REPEAT SUBOCCIPITAL CRANIECTOMY; C1 LAMINECTOMY AND DURAPLASTY; PLACEMENT OF 4TH VENTRICULAR-SUBARACHNOID SHUNT; IMPULSE MONITORING;  Surgeon: Garry Arias MD;  [facility];  Service: Neurosurgeryx3    WA LAPAROSCOPY W/RMVL ADNEXAL STRUCTURES Bilateral 5/29/2020    Procedure: SALPINGECTOMY, LAPAROSCOPIC BILATERAL;  Surgeon: Stephanie Swanson MD;  [facility];  Service: Gynecology    WA TOTAL DISC ARTHRP ANT SINGLE INTERSPACE CERVICAL N/A 1/25/2019    Procedure: Anterior cervical discectomy and total disc arthroplasty C5-6;  Surgeon: Garry Arias MD;  [facility];  Service: Neurosurgery    TUBAL LIGATION  06/2020       Family History  "  Problem Relation Age of Onset    Other Mother         sleep apnea    Hypertension Father     Drug abuse Father     Alcohol abuse Father     No Known Problems Sister     Asthma Son     Heart disease Maternal Grandmother     Diabetes Paternal Grandmother         type 2    Arthritis Paternal Grandmother     No Known Problems Daughter     Stroke Maternal Grandfather     Heart disease Maternal Grandfather     Diabetes Paternal Grandfather         type 2    Heart disease Paternal Grandfather     No Known Problems Sister     Heart disease Sister         failure    Heart disease Maternal Aunt          Medications have been verified.        Objective   /75   Pulse 70   Temp 98.1 °F (36.7 °C) (Temporal)   Resp 18   Ht 5' 2.5\" (1.588 m)   Wt 50.8 kg (112 lb)   SpO2 98%   BMI 20.16 kg/m²        Physical Exam     Physical Exam  Vitals and nursing note reviewed.   Constitutional:       General: She is not in acute distress.     Appearance: Normal appearance. She is well-developed and normal weight. She is not ill-appearing, toxic-appearing or diaphoretic.   HENT:      Head: Normocephalic and atraumatic.      Right Ear: Tympanic membrane, ear canal and external ear normal.      Left Ear: Tympanic membrane, ear canal and external ear normal.      Nose: Congestion and rhinorrhea present.      Mouth/Throat:      Mouth: Mucous membranes are moist. No oral lesions.      Pharynx: Posterior oropharyngeal erythema present. No oropharyngeal exudate.   Eyes:      General: No scleral icterus.     Extraocular Movements: Extraocular movements intact.      Right eye: Normal extraocular motion.      Left eye: Normal extraocular motion.      Conjunctiva/sclera: Conjunctivae normal.      Pupils: Pupils are equal, round, and reactive to light.   Cardiovascular:      Rate and Rhythm: Normal rate and regular rhythm.      Pulses: Normal pulses.      Heart sounds: Normal heart sounds.   Pulmonary:      Effort: Pulmonary effort is normal. " No respiratory distress.      Breath sounds: Normal breath sounds. No wheezing, rhonchi or rales.   Abdominal:      Palpations: Abdomen is soft.   Musculoskeletal:         General: Normal range of motion.      Cervical back: Normal range of motion and neck supple. No tenderness.   Lymphadenopathy:      Cervical: No cervical adenopathy.   Skin:     General: Skin is warm and dry.      Capillary Refill: Capillary refill takes less than 2 seconds.      Findings: No rash.   Neurological:      General: No focal deficit present.      Mental Status: She is alert and oriented to person, place, and time.      Coordination: Coordination normal.      Gait: Gait normal.   Psychiatric:         Mood and Affect: Mood normal.         Behavior: Behavior normal.

## 2024-03-30 NOTE — PATIENT INSTRUCTIONS
Rhinosinusitis   WHAT YOU NEED TO KNOW:   Rhinosinusitis (RS) is inflammation or infection of your nasal passages and sinuses. RS is most often caused by a virus but may be caused by bacteria. Acute RS lasts up to 12 weeks. Chronic RS lasts more than 12 weeks. Recurrent RS means you have 4 or more infections in 1 year.       DISCHARGE INSTRUCTIONS:   Return to the emergency department if:   You have trouble breathing, or wheezing that is getting worse.    You have a stiff neck, a fever, or a bad headache.    You cannot open your eye.    Your eyeball bulges out, or you cannot move your eye.    You are more sleepy than usual, or you notice changes in your ability to think, move, or talk.    You have swelling of your forehead or scalp.    Call your doctor if:   You have vision changes, such as double vision.    Your eye and eyelid are red, swollen, and painful.    Your symptoms do not improve after 10 days.    You have nausea and are vomiting.    Your nose is bleeding.    You have questions or concerns about your condition or care.    Medicines:  Your symptoms may go away on their own. Your healthcare provider may recommend watchful waiting for up to 10 days before starting antibiotics. Antibiotics will not help if the infection is caused by a virus. Check with your provider before you take any over-the-counter medicines. You may need any of the following:  Acetaminophen  decreases pain and fever. It is available without a doctor's order. Ask how much to take and how often to take it. Follow directions. Read the labels of all other medicines you are using to see if they also contain acetaminophen, or ask your doctor or pharmacist. Acetaminophen can cause liver damage if not taken correctly.    NSAIDs , such as ibuprofen, help decrease swelling, pain, and fever. This medicine is available with or without a doctor's order. NSAIDs can cause stomach bleeding or kidney problems in certain people. If you take blood thinner  medicine, always ask your healthcare provider if NSAIDs are safe for you. Always read the medicine label and follow directions.    Nasal steroid sprays  help decrease inflammation in your nose and sinuses.    Decongestants  help reduce swelling and drain mucus in the nose and sinuses. They may help you breathe easier. Do not use decongestants for more than 3 days.    Antihistamines  help dry mucus in the nose and relieve sneezing.    Antibiotics  help treat or prevent a bacterial infection.    Self-care:   Rinse your sinuses as directed.  Use a sinus rinse device to rinse your nasal passages with a saline (salt water) solution or distilled water. Do not  use tap water. A sinus rinse will help thin the mucus in your nose and rinse away pollen and dirt. It will also help lower swelling so you can breathe normally.    Use a humidifier  to increase air moisture in your home. Moist air may make it easier for you to breathe and help decrease your cough.    Sleep with your head raised.  Place an extra pillow under your head before you go to sleep to help your sinuses drain.    Drink liquids as directed.  Ask your healthcare provider how much liquid to drink each day and which liquids are best for you. Liquids will thin the mucus in your nose and help it drain. Do not have drinks that contain alcohol or caffeine.    Do not smoke, and avoid secondhand smoke.  Nicotine and other chemicals in cigarettes and cigars can make your symptoms worse. Ask your healthcare provider for information if you currently smoke and need help to quit. E-cigarettes or smokeless tobacco still contain nicotine. Talk to your healthcare provider before you use these products.    Prevent the spread of germs:   Wash your hands often with soap and water.  Wash your hands after you use the bathroom, change a child's diaper, or sneeze. Wash your hands before you prepare or eat food.         Stay away from people who are sick.  Some germs spread easily and  quickly through contact.    Follow up with your doctor as directed:  You may be referred to an ear, nose, and throat specialist. Write down your questions so you remember to ask them during your visits.  © Copyright [employer] 2023 Information is for End User's use only and may not be sold, redistributed or otherwise used for commercial purposes.  The above information is an  only. It is not intended as medical advice for individual conditions or treatments. Talk to your doctor, nurse or pharmacist before following any medical regimen to see if it is safe and effective for you.

## 2024-10-01 ENCOUNTER — OFFICE VISIT (OUTPATIENT)
Dept: URGENT CARE | Age: 39
End: 2024-10-01
Payer: COMMERCIAL

## 2024-10-01 VITALS
RESPIRATION RATE: 18 BRPM | SYSTOLIC BLOOD PRESSURE: 117 MMHG | HEART RATE: 66 BPM | WEIGHT: 133.6 LBS | BODY MASS INDEX: 24.59 KG/M2 | OXYGEN SATURATION: 98 % | TEMPERATURE: 99.1 F | HEIGHT: 62 IN | DIASTOLIC BLOOD PRESSURE: 73 MMHG

## 2024-10-01 DIAGNOSIS — J02.9 SORE THROAT: ICD-10-CM

## 2024-10-01 DIAGNOSIS — J04.0 ACUTE LARYNGITIS: Primary | ICD-10-CM

## 2024-10-01 LAB — S PYO AG THROAT QL: NEGATIVE

## 2024-10-01 PROCEDURE — S9083 URGENT CARE CENTER GLOBAL: HCPCS | Performed by: EMERGENCY MEDICINE

## 2024-10-01 PROCEDURE — G0382 LEV 3 HOSP TYPE B ED VISIT: HCPCS | Performed by: EMERGENCY MEDICINE

## 2024-10-01 PROCEDURE — 87880 STREP A ASSAY W/OPTIC: CPT

## 2024-10-01 RX ORDER — METHYLPREDNISOLONE 4 MG
TABLET, DOSE PACK ORAL
Qty: 21 TABLET | Refills: 0 | Status: SHIPPED | OUTPATIENT
Start: 2024-10-01

## 2024-10-01 NOTE — PATIENT INSTRUCTIONS
Mucinex    Pseudoephedrine ( 30 mg)     Benadryl at night    Claritin day    Vicks    Throat lozenges

## 2024-10-01 NOTE — PROGRESS NOTES
[facility]        NAME: Ary Lemus is a 39 y.o. female  : 1985    MRN: [id number]  DATE: 2024  TIME: 6:44 PM    Assessment and Plan   Acute laryngitis [J04.0]  1. Acute laryngitis  methylPREDNISolone 4 MG tablet therapy pack      2. Sore throat  POCT rapid ANTIGEN strepA        Sore throat, congestion, PND- no fever. Voice change. Strep negative.     Patient Instructions       Follow up with PCP in 3-5 days.  Proceed to  ER if symptoms worsen.    If tests have been performed at [facility], our office will contact you with results if changes need to be made to the care plan discussed with you at the visit.  You can review your full results on [facility].    Chief Complaint     Chief Complaint   Patient presents with    Sore Throat    Cough     Patient presents with complaints of symptoms starting yesterday with a migraine and fever.          History of Present Illness       Sore throat, congestion, PND- no fever. Voice change. Strep negative.     Sore Throat   Associated symptoms include congestion and coughing.   Cough  Associated symptoms include postnasal drip and a sore throat. Pertinent negatives include no fever.       Review of Systems   Review of Systems   Constitutional:  Negative for activity change and fever.   HENT:  Positive for congestion, postnasal drip, sore throat and voice change.    Respiratory:  Positive for cough.    All other systems reviewed and are negative.        Current Medications       Current Outpatient Medications:     methylPREDNISolone 4 MG tablet therapy pack, Use as directed on package, Disp: 21 tablet, Rfl: 0    pseudoephedrine-guaifenesin (MUCINEX D)  MG per tablet, Take 1 tablet by mouth every 12 (twelve) hours, Disp: 18 tablet, Rfl: 0    Current Allergies     Allergies as of 10/01/2024 - Reviewed 10/01/2024   Allergen Reaction Noted    Cephalexin  2017    Sunscreens Hives 2016            The following portions of the  patient's history were reviewed and updated as appropriate: allergies, current medications, past family history, past medical history, past social history, past surgical history and problem list.     Past Medical History:   Diagnosis Date    Abnormal Pap smear of cervix     Chiari I malformation (HCC)     Kidney stone     UTI (urinary tract infection)     treated with antibiotics recently will have ua repeated     Varicella     childhood       Past Surgical History:   Procedure Laterality Date    CERVICAL BIOPSY  W/ LOOP ELECTRODE EXCISION      COLPOSCOPY      RI CONIZATION CERVIX W/WO D&C RPR ELTRD EXC N/A 5/29/2020    Procedure: BIOPSY LEEP CERVIX;  Surgeon: Stephanie Swanson MD;  [facility];  Service: Gynecology    RI CRNEC SUBOCCIPITAL CRV TA DCMPRN MEDULLA & CORD N/A 12/14/2016    Procedure: REPEAT SUBOCCIPITAL CRANIECTOMY; C1 LAMINECTOMY AND DURAPLASTY; PLACEMENT OF 4TH VENTRICULAR-SUBARACHNOID SHUNT; IMPULSE MONITORING;  Surgeon: Garry Arias MD;  [facility];  Service: Neurosurgeryx3    RI LAPAROSCOPY W/RMVL ADNEXAL STRUCTURES Bilateral 5/29/2020    Procedure: SALPINGECTOMY, LAPAROSCOPIC BILATERAL;  Surgeon: Stephanie Swanson MD;  [facility];  Service: Gynecology    RI TOTAL DISC ARTHRP ANT SINGLE INTERSPACE CERVICAL N/A 1/25/2019    Procedure: Anterior cervical discectomy and total disc arthroplasty C5-6;  Surgeon: Garry Arias MD;  [facility];  Service: Neurosurgery    TUBAL LIGATION  06/2020       Family History   Problem Relation Age of Onset    Other Mother         sleep apnea    Hypertension Father     Drug abuse Father     Alcohol abuse Father     No Known Problems Sister     Asthma Son     Heart disease Maternal Grandmother     Diabetes Paternal Grandmother         type 2    Arthritis Paternal Grandmother     No Known Problems Daughter     Stroke Maternal Grandfather     Heart disease Maternal Grandfather     Diabetes Paternal Grandfather          "type 2    Heart disease Paternal Grandfather     No Known Problems Sister     Heart disease Sister         failure    Heart disease Maternal Aunt          Medications have been verified.        Objective   /73   Pulse 66   Temp 99.1 °F (37.3 °C) (Tympanic)   Resp 18   Ht 5' 2\" (1.575 m)   Wt 60.6 kg (133 lb 9.6 oz)   SpO2 98%   BMI 24.44 kg/m²   No LMP recorded.       Physical Exam     Physical Exam  Vitals reviewed.   Constitutional:       Appearance: She is well-developed.   HENT:      Nose: Congestion and rhinorrhea present.      Mouth/Throat:      Pharynx: Posterior oropharyngeal erythema present.      Tonsils: No tonsillar exudate. 1+ on the right. 1+ on the left.   Cardiovascular:      Rate and Rhythm: Normal rate and regular rhythm.   Pulmonary:      Effort: Pulmonary effort is normal.      Breath sounds: Normal breath sounds.   Lymphadenopathy:      Cervical: Cervical adenopathy present.   Neurological:      Mental Status: She is alert.                   "

## 2024-12-21 ENCOUNTER — OFFICE VISIT (OUTPATIENT)
Dept: URGENT CARE | Age: 39
End: 2024-12-21
Payer: COMMERCIAL

## 2024-12-21 ENCOUNTER — APPOINTMENT (OUTPATIENT)
Dept: RADIOLOGY | Age: 39
End: 2024-12-21
Payer: COMMERCIAL

## 2024-12-21 VITALS
BODY MASS INDEX: 23 KG/M2 | OXYGEN SATURATION: 99 % | TEMPERATURE: 98.2 F | WEIGHT: 125 LBS | RESPIRATION RATE: 20 BRPM | HEIGHT: 62 IN | HEART RATE: 61 BPM

## 2024-12-21 DIAGNOSIS — S59.911A: Primary | ICD-10-CM

## 2024-12-21 DIAGNOSIS — S69.91XA: Primary | ICD-10-CM

## 2024-12-21 DIAGNOSIS — T14.90XA INJURY: ICD-10-CM

## 2024-12-21 PROCEDURE — 73110 X-RAY EXAM OF WRIST: CPT

## 2024-12-21 PROCEDURE — G0382 LEV 3 HOSP TYPE B ED VISIT: HCPCS
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
## 2024-12-21 NOTE — PATIENT INSTRUCTIONS
Rest, ice, compression and elevation.  Alternate ibuprofen and Tylenol as needed for pain.  Referral to orthopedics for further evaluation.  Wrist brace as needed for comfort.  You may wear the brace as needed, it does not need to remain intact at all times.  You do not need to wear the brace during bedtime.

## 2025-02-12 ENCOUNTER — TELEPHONE (OUTPATIENT)
Age: 40
End: 2025-02-12

## 2025-02-12 DIAGNOSIS — M54.2 NECK PAIN: Primary | ICD-10-CM

## 2025-02-12 DIAGNOSIS — R51.9 HEADACHE: ICD-10-CM

## 2025-02-12 DIAGNOSIS — G95.0 SYRINGOMYELIA (HCC): ICD-10-CM

## 2025-02-12 DIAGNOSIS — Z86.69 HISTORY OF CHIARI MALFORMATION: ICD-10-CM

## 2025-02-12 NOTE — TELEPHONE ENCOUNTER
Pt called for follow up and speak to nurse regarding symptoms, warm transfer to Felicia RN.  Pt also needs MRI cervical order updated.

## 2025-02-12 NOTE — TELEPHONE ENCOUNTER
Call received from patient stating she has been having an increased in symptoms that she's had in the past. She stated she has been having an increase in numbness/tingling in her hands and fingertips. She also reports dizziness over the last week. She stated she has been eating and drinking as normal. Patient also reports typically going to the gym regularly however when taking a week off she became very dizzy when attempting to workout again. She would like to come in to the office for a follow up now rather than waiting for her 5 yr follow up.     Placed updated MRI script as previous was . Transferred to central scheduling. Patient aware to call back to schedule follow up office appt.     She was appreciative of the call.

## 2025-03-03 ENCOUNTER — HOSPITAL ENCOUNTER (OUTPATIENT)
Dept: RADIOLOGY | Facility: HOSPITAL | Age: 40
Discharge: HOME | End: 2025-03-03
Attending: NEUROLOGICAL SURGERY
Payer: COMMERCIAL

## 2025-03-03 DIAGNOSIS — Z86.69 HISTORY OF CHIARI MALFORMATION: ICD-10-CM

## 2025-03-03 DIAGNOSIS — M54.2 NECK PAIN: ICD-10-CM

## 2025-03-03 DIAGNOSIS — R51.9 HEADACHE: ICD-10-CM

## 2025-03-03 DIAGNOSIS — G95.0 SYRINGOMYELIA (HCC): ICD-10-CM

## 2025-03-03 PROCEDURE — 72141 MRI NECK SPINE W/O DYE: CPT

## 2025-03-11 ENCOUNTER — APPOINTMENT (EMERGENCY)
Dept: CT IMAGING | Facility: HOSPITAL | Age: 40
End: 2025-03-11
Payer: COMMERCIAL

## 2025-03-11 ENCOUNTER — HOSPITAL ENCOUNTER (EMERGENCY)
Facility: HOSPITAL | Age: 40
Discharge: HOME | End: 2025-03-11
Attending: EMERGENCY MEDICINE | Admitting: EMERGENCY MEDICINE
Payer: COMMERCIAL

## 2025-03-11 VITALS
SYSTOLIC BLOOD PRESSURE: 121 MMHG | RESPIRATION RATE: 18 BRPM | HEART RATE: 78 BPM | OXYGEN SATURATION: 96 % | DIASTOLIC BLOOD PRESSURE: 61 MMHG | TEMPERATURE: 98.3 F

## 2025-03-11 DIAGNOSIS — G95.0 SYRINGOMYELIA (HCC): ICD-10-CM

## 2025-03-11 DIAGNOSIS — R51.9 ACUTE HEADACHE: Primary | ICD-10-CM

## 2025-03-11 DIAGNOSIS — R20.0 NUMBNESS: ICD-10-CM

## 2025-03-11 LAB
ALBUMIN SERPL BCG-MCNC: 4.4 G/DL (ref 3.5–5)
ALP SERPL-CCNC: 69 U/L (ref 34–104)
ALT SERPL W P-5'-P-CCNC: 13 U/L (ref 7–52)
ANION GAP SERPL CALCULATED.3IONS-SCNC: 5 MMOL/L (ref 4–13)
AST SERPL W P-5'-P-CCNC: 12 U/L (ref 13–39)
BASOPHILS # BLD AUTO: 0.03 THOUSANDS/ÂΜL (ref 0–0.1)
BASOPHILS NFR BLD AUTO: 0 % (ref 0–1)
BILIRUB SERPL-MCNC: 0.5 MG/DL (ref 0.2–1)
BUN SERPL-MCNC: 18 MG/DL (ref 5–25)
CALCIUM SERPL-MCNC: 9 MG/DL (ref 8.4–10.2)
CHLORIDE SERPL-SCNC: 106 MMOL/L (ref 96–108)
CO2 SERPL-SCNC: 26 MMOL/L (ref 21–32)
CREAT SERPL-MCNC: 0.73 MG/DL (ref 0.6–1.3)
EOSINOPHIL # BLD AUTO: 0.14 THOUSAND/ÂΜL (ref 0–0.61)
EOSINOPHIL NFR BLD AUTO: 2 % (ref 0–6)
ERYTHROCYTE [DISTWIDTH] IN BLOOD BY AUTOMATED COUNT: 12 % (ref 11.6–15.1)
EXT PREGNANCY TEST URINE: NEGATIVE
EXT. CONTROL: NORMAL
GFR SERPL CREATININE-BSD FRML MDRD: 104 ML/MIN/1.73SQ M
GLUCOSE SERPL-MCNC: 94 MG/DL (ref 65–140)
HCT VFR BLD AUTO: 42.1 % (ref 34.8–46.1)
HGB BLD-MCNC: 14 G/DL (ref 11.5–15.4)
IMM GRANULOCYTES # BLD AUTO: 0.02 THOUSAND/UL (ref 0–0.2)
IMM GRANULOCYTES NFR BLD AUTO: 0 % (ref 0–2)
LYMPHOCYTES # BLD AUTO: 1.62 THOUSANDS/ÂΜL (ref 0.6–4.47)
LYMPHOCYTES NFR BLD AUTO: 23 % (ref 14–44)
MCH RBC QN AUTO: 30.4 PG (ref 26.8–34.3)
MCHC RBC AUTO-ENTMCNC: 33.3 G/DL (ref 31.4–37.4)
MCV RBC AUTO: 92 FL (ref 82–98)
MONOCYTES # BLD AUTO: 0.32 THOUSAND/ÂΜL (ref 0.17–1.22)
MONOCYTES NFR BLD AUTO: 5 % (ref 4–12)
NEUTROPHILS # BLD AUTO: 4.96 THOUSANDS/ÂΜL (ref 1.85–7.62)
NEUTS SEG NFR BLD AUTO: 70 % (ref 43–75)
NRBC BLD AUTO-RTO: 0 /100 WBCS
PLATELET # BLD AUTO: 216 THOUSANDS/UL (ref 149–390)
PMV BLD AUTO: 10 FL (ref 8.9–12.7)
POTASSIUM SERPL-SCNC: 3.8 MMOL/L (ref 3.5–5.3)
PROT SERPL-MCNC: 7 G/DL (ref 6.4–8.4)
RBC # BLD AUTO: 4.6 MILLION/UL (ref 3.81–5.12)
SODIUM SERPL-SCNC: 137 MMOL/L (ref 135–147)
WBC # BLD AUTO: 7.09 THOUSAND/UL (ref 4.31–10.16)

## 2025-03-11 PROCEDURE — 96374 THER/PROPH/DIAG INJ IV PUSH: CPT

## 2025-03-11 PROCEDURE — 36415 COLL VENOUS BLD VENIPUNCTURE: CPT | Performed by: EMERGENCY MEDICINE

## 2025-03-11 PROCEDURE — 99285 EMERGENCY DEPT VISIT HI MDM: CPT | Performed by: EMERGENCY MEDICINE

## 2025-03-11 PROCEDURE — 70450 CT HEAD/BRAIN W/O DYE: CPT

## 2025-03-11 PROCEDURE — 81025 URINE PREGNANCY TEST: CPT | Performed by: EMERGENCY MEDICINE

## 2025-03-11 PROCEDURE — 99284 EMERGENCY DEPT VISIT MOD MDM: CPT

## 2025-03-11 PROCEDURE — 80053 COMPREHEN METABOLIC PANEL: CPT | Performed by: EMERGENCY MEDICINE

## 2025-03-11 PROCEDURE — 85025 COMPLETE CBC W/AUTO DIFF WBC: CPT | Performed by: EMERGENCY MEDICINE

## 2025-03-11 RX ORDER — DROPERIDOL 2.5 MG/ML
0.62 INJECTION, SOLUTION INTRAMUSCULAR; INTRAVENOUS ONCE
Status: COMPLETED | OUTPATIENT
Start: 2025-03-11 | End: 2025-03-11

## 2025-03-11 RX ADMIN — DROPERIDOL 0.62 MG: 2.5 INJECTION, SOLUTION INTRAMUSCULAR; INTRAVENOUS at 19:48

## 2025-03-11 NOTE — ED PROVIDER NOTES
Time reflects when diagnosis was documented in both MDM as applicable and the Disposition within this note       Time User Action Codes Description Comment    3/11/2025  9:01 PM Shahid Ron Add [R51.9] Acute headache     3/11/2025  9:01 PM Shahid Ron Add [R20.0] Numbness     3/11/2025  9:01 PM LegShahid zaman Add [G95.0] Syringomyelia (HCC)           ED Disposition       ED Disposition   Discharge    Condition   Stable    Date/Time   Tue Mar 11, 2025  9:00 PM    Comment   Ary Allan discharge to home/self care.                   Assessment & Plan       Medical Decision Making  Patient is a 39-year-old female, with a history significant for Chiari I malformation and syringomyelia per my review the medical record, who presents to the ED today for evaluation of a 3 to 4-day history of gradual onset, constant, progressively worsening, pressure in character headache that extends to the neck and radiates down the back.  Patient denies trauma.  She reports associated bilateral upper extremity numbness but reports that this is worse on the left when compared to the right.  Patient also describes some allopathic symptoms such as clumsiness/dropping objects primarily with her left upper extremity.  Patient denies fever, vomiting, chest pain, dyspnea, abdominal pain, urinary symptoms, difficulty walking.  Patient states that she has a  shunt.  She reports taking NSAIDs to try and admit her symptoms and states that position change exacerbates her discomfort.  Patient has upcoming follow-up with neurosurgery but she states she was instructed to present to the emergency department if she had worsening symptoms.  MRI brain performed in July 2023 notable for no acute change in MRI C-spine performed on 3 March of this year notable for worsening C6-7 neuroforaminal narrowing.  Patient is currently afebrile and hemodynamically stable.  Physical exam is notable for subjective decrease sensation left  upper extremity compared to the right, clear heart and lungs, soft nontender abdomen, no other focal neurodeficit.  This presentation is concerning for: Primary headache, pregnancy/pregnancy complication, electrolyte abnormality, hydrocephalus, increased intracranial pressure, shunt blockage, progression of Chiari malformation.  Will investigate with CT head, shunt series, CBC, CMP, pregnancy test.  Will discuss with neurosurgery.  Will manage with droperidol and further based upon workup    Amount and/or Complexity of Data Reviewed  Labs: ordered.  Radiology: ordered.    Risk  Prescription drug management.        ED Course as of 03/11/25 2107 Tue Mar 11, 2025   1949 I discussed case with JUJU Mcknight from neurosurgery -will await CT head, no shunt series necessary as it is fourth ventricle to subarachnoid shunt, but, if CT head unremarkable, treat as headache and no anticipated surgical intervention.  Keep follow-up with neurosurgery   2107 On reevaluation, patient clinically unchanged.  Results reviewed with patient and patient agreeable with discharge and close follow-up with neurosurgery.  Questions answered to her satisfaction.  Patient states she is feeling baseline with headache at this time   2107 Patient has neither questions nor concerns after receiving discharge instructions and return precautions        Medications   droperidol (INAPSINE) injection 0.625 mg (0.625 mg Intravenous Given 3/11/25 1948)       ED Risk Strat Scores                                                History of Present Illness       Chief Complaint   Patient presents with    Headache     Pt reports head and neck pain for a few weeks with intermittent hand numbness some times right sometimes left. Pt already had MRi but is waiting for results        Past Medical History:   Diagnosis Date    Abnormal Pap smear of cervix     Chiari I malformation (HCC)     Kidney stone     UTI (urinary tract infection)     treated with  antibiotics recently will have ua repeated     Varicella     childhood      Past Surgical History:   Procedure Laterality Date    CERVICAL BIOPSY  W/ LOOP ELECTRODE EXCISION      COLPOSCOPY      MD CONIZATION CERVIX W/WO D&C RPR ELTRD EXC N/A 5/29/2020    Procedure: BIOPSY LEEP CERVIX;  Surgeon: Stephanie Swanson MD;  Location: [facility];  Service: Gynecology    MD CRNEC SUBOCCIPITAL CRV TA DCMPRN MEDULLA & CORD N/A 12/14/2016    Procedure: REPEAT SUBOCCIPITAL CRANIECTOMY; C1 LAMINECTOMY AND DURAPLASTY; PLACEMENT OF 4TH VENTRICULAR-SUBARACHNOID SHUNT; IMPULSE MONITORING;  Surgeon: Garry Arias MD;  Location: [facility];  Service: Neurosurgeryx3    MD LAPAROSCOPY W/RMVL ADNEXAL STRUCTURES Bilateral 5/29/2020    Procedure: SALPINGECTOMY, LAPAROSCOPIC BILATERAL;  Surgeon: Stephanie Swanson MD;  Location: [facility];  Service: Gynecology    MD TOTAL DISC ARTHRP ANT SINGLE INTERSPACE CERVICAL N/A 1/25/2019    Procedure: Anterior cervical discectomy and total disc arthroplasty C5-6;  Surgeon: Garry Arias MD;  Location: [facility];  Service: Neurosurgery    TUBAL LIGATION  06/2020      Family History   Problem Relation Age of Onset    Other Mother         sleep apnea    Hypertension Father     Drug abuse Father     Alcohol abuse Father     No Known Problems Sister     Asthma Son     Heart disease Maternal Grandmother     Diabetes Paternal Grandmother         type 2    Arthritis Paternal Grandmother     No Known Problems Daughter     Stroke Maternal Grandfather     Heart disease Maternal Grandfather     Diabetes Paternal Grandfather         type 2    Heart disease Paternal Grandfather     No Known Problems Sister     Heart disease Sister         failure    Heart disease Maternal Aunt       Social History     Tobacco Use    Smoking status: Never    Smokeless tobacco: Never   Vaping Use    Vaping status: Never Used   Substance Use Topics    Alcohol use: Yes     Comment: occasionally    Drug use: No       E-Cigarette/Vaping    E-Cigarette Use Never User       E-Cigarette/Vaping Substances    Nicotine No     THC No     CBD No     Flavoring No     Other No     Unknown No       I have reviewed and agree with the history as documented.     Patient is a 39-year-old female, with a history significant for Chiari I malformation and syringomyelia per my review the medical record, who presents to the ED today for evaluation of a 3 to 4-day history of gradual onset, constant, progressively worsening, pressure in character headache that extends to the neck and radiates down the back.  Patient denies trauma.  She reports associated bilateral upper extremity numbness but reports that this is worse on the left when compared to the right.  Patient also describes some allopathic symptoms such as clumsiness/dropping objects primarily with her left upper extremity.  Patient denies fever, vomiting, chest pain, dyspnea, abdominal pain, urinary symptoms, difficulty walking.  Patient states that she has a  shunt.  She reports taking NSAIDs to try and admit her symptoms and states that position change exacerbates her discomfort.  Patient has upcoming follow-up with neurosurgery but she states she was instructed to present to the emergency department if she had worsening symptoms.  MRI brain performed in July 2023 notable for no acute change in MRI C-spine performed on 3 March of this year notable for worsening C6-7 neuroforaminal narrowing.  Patient is without other concerns at this time.        Review of Systems   Constitutional:  Negative for fever.   HENT:  Negative for trouble swallowing.    Eyes:  Negative for visual disturbance.   Respiratory:  Negative for shortness of breath.    Cardiovascular:  Negative for chest pain.   Gastrointestinal:  Negative for abdominal pain.   Endocrine: Negative for polyuria.   Genitourinary:  Negative for dysuria.   Musculoskeletal:  Negative for gait problem.   Skin:  Negative for rash.    Allergic/Immunologic: Positive for environmental allergies.   Neurological:  Positive for numbness and headaches. Negative for weakness.   Hematological:  Negative for adenopathy.   Psychiatric/Behavioral:  Negative for confusion.    All other systems reviewed and are negative.          Objective       ED Triage Vitals [03/11/25 1814]   Temperature Pulse Blood Pressure Respirations SpO2 Patient Position - Orthostatic VS   98.3 °F (36.8 °C) 78 121/61 18 96 % Sitting      Temp Source Heart Rate Source BP Location FiO2 (%) Pain Score    Oral Monitor Right arm -- --      Vitals      Date and Time Temp Pulse SpO2 Resp BP Pain Score FACES Pain Rating User   03/11/25 1814 98.3 °F (36.8 °C) 78 96 % 18 121/61 -- -- AK            Physical Exam  Vitals and nursing note reviewed.   Constitutional:       General: She is not in acute distress.     Appearance: She is not toxic-appearing.   HENT:      Head: Normocephalic and atraumatic.      Right Ear: External ear normal.      Left Ear: External ear normal.      Nose: Nose normal. No rhinorrhea.      Mouth/Throat:      Mouth: Mucous membranes are moist.      Pharynx: Oropharynx is clear. No oropharyngeal exudate or posterior oropharyngeal erythema.      Comments: Uvula midline. No oropharyngeal or submandibular mass/swelling  Eyes:      General: No scleral icterus.        Right eye: No discharge.         Left eye: No discharge.      Conjunctiva/sclera: Conjunctivae normal.      Pupils: Pupils are equal, round, and reactive to light.   Neck:      Comments: Patient is spontaneously rotating their neck to the left and right during the history and physical exam interaction without difficulty or apparent discomfort    Cardiovascular:      Rate and Rhythm: Normal rate and regular rhythm.      Pulses: Normal pulses.      Heart sounds: Normal heart sounds. No murmur heard.     No friction rub. No gallop.      Comments: 2+ Radial  Pulmonary:      Effort: Pulmonary effort is normal. No  respiratory distress.      Breath sounds: Normal breath sounds. No stridor. No wheezing, rhonchi or rales.   Abdominal:      General: Abdomen is flat. There is no distension.      Palpations: Abdomen is soft.      Tenderness: There is no abdominal tenderness. There is no right CVA tenderness, left CVA tenderness, guarding or rebound.   Musculoskeletal:         General: No deformity.      Cervical back: Neck supple. No tenderness. No muscular tenderness.   Lymphadenopathy:      Cervical: No cervical adenopathy.   Skin:     General: Skin is warm and dry.      Capillary Refill: Capillary refill takes less than 2 seconds.   Neurological:      Mental Status: She is alert.      Comments: Cranial nerves 2-12 intact.  5/5 strength in all four extremities including finger extension against resistance.  Sensation to light touch diminished in the left upper extremity when compared to the right.  Otherwise symmetric sensation.  Patient able to ambulate without difficulty.    Patient is speaking clearly in complete sentences.  Patient is answering appropriately and able to follow commands.    Psychiatric:         Mood and Affect: Mood normal.         Behavior: Behavior normal.         Results Reviewed       Procedure Component Value Units Date/Time    Comprehensive metabolic panel [[id number]]  (Abnormal) Collected: 03/11/25 1947    Lab Status: Final result Specimen: Blood from Arm, Left Updated: 03/11/25 2022     Sodium 137 mmol/L      Potassium 3.8 mmol/L      Chloride 106 mmol/L      CO2 26 mmol/L      ANION GAP 5 mmol/L      BUN 18 mg/dL      Creatinine 0.73 mg/dL      Glucose 94 mg/dL      Calcium 9.0 mg/dL      AST 12 U/L      ALT 13 U/L      Alkaline Phosphatase 69 U/L      Total Protein 7.0 g/dL      Albumin 4.4 g/dL      Total Bilirubin 0.50 mg/dL      eGFR 104 ml/min/1.73sq m     Narrative:      [employer] guidelines for Chronic Kidney Disease (CKD):     Stage 1 with normal or high GFR (GFR > 90  mL/min/1.73 square meters)    Stage 2 Mild CKD (GFR = 60-89 mL/min/1.73 square meters)    Stage 3A Moderate CKD (GFR = 45-59 mL/min/1.73 square meters)    Stage 3B Moderate CKD (GFR = 30-44 mL/min/1.73 square meters)    Stage 4 Severe CKD (GFR = 15-29 mL/min/1.73 square meters)    Stage 5 End Stage CKD (GFR <15 mL/min/1.73 square meters)  Note: GFR calculation is accurate only with a steady state creatinine    CBC and differential [[id number]] Collected: 03/11/25 1947    Lab Status: Final result Specimen: Blood from Arm, Left Updated: 03/11/25 1958     WBC 7.09 Thousand/uL      RBC 4.60 Million/uL      Hemoglobin 14.0 g/dL      Hematocrit 42.1 %      MCV 92 fL      MCH 30.4 pg      MCHC 33.3 g/dL      RDW 12.0 %      MPV 10.0 fL      Platelets 216 Thousands/uL      nRBC 0 /100 WBCs      Segmented % 70 %      Immature Grans % 0 %      Lymphocytes % 23 %      Monocytes % 5 %      Eosinophils Relative 2 %      Basophils Relative 0 %      Absolute Neutrophils 4.96 Thousands/µL      Absolute Immature Grans 0.02 Thousand/uL      Absolute Lymphocytes 1.62 Thousands/µL      Absolute Monocytes 0.32 Thousand/µL      Eosinophils Absolute 0.14 Thousand/µL      Basophils Absolute 0.03 Thousands/µL     POCT pregnancy, urine [[id number]]  (Normal) Collected: 03/11/25 1957    Lab Status: Final result Updated: 03/11/25 1958     EXT Preg Test, Ur Negative     Control Valid            CT head without contrast   Final Interpretation by Jordan Velasco MD (03/11 2049)      No acute intracranial abnormality.      Since June 2023 there is unchanged appearance of posterior fossa decompression for Chiari I malformation. Unchanged position of fourth ventricular shunt catheter.                  Workstation performed: [id number]             Procedures    ED Medication and Procedure Management   Prior to Admission Medications   Prescriptions Last Dose Informant Patient Reported? Taking?   methylPREDNISolone 4 MG tablet therapy pack   No No    Sig: Use as directed on package   Patient not taking: Reported on 12/21/2024   pseudoephedrine-guaifenesin (MUCINEX D)  MG per tablet   No No   Sig: Take 1 tablet by mouth every 12 (twelve) hours   Patient not taking: Reported on 12/21/2024      Facility-Administered Medications: None     Patient's Medications   Discharge Prescriptions    No medications on file       ED SEPSIS DOCUMENTATION   Time reflects when diagnosis was documented in both MDM as applicable and the Disposition within this note       Time User Action Codes Description Comment    3/11/2025  9:01 PM Shahid Ron Add [R51.9] Acute headache     3/11/2025  9:01 PM Shahid Ron Add [R20.0] Numbness     3/11/2025  9:01 PM Shahid Ron Add [G95.0] Syringomyelia (HCC)                  Shahid Ron MD  03/11/25 5880

## 2025-03-12 NOTE — DISCHARGE INSTRUCTIONS
You were evaluated in the emergency department for: headache. You can access your current and pending results through [facility]'s youwho. A radiologist will take a second look at your X-Rays, if you had any, and you will be contacted with any new findings.     You should follow-up with your primary care provider, as soon as possible, for re-evaluation.  If you do not have a primary care provider, I have referred you to [facility]. You will be contacted about scheduling an appointment. Their phone number is also included on this paperwork.  You should keep your follow-up with neurosurgery.    You may take 650mg of tylenol every four to six hours, not exceeding 3,000mg daily, for the management of your discomfort. You may also take ibuprofen, 400mg every six to eight hours.      Your workup revealed no emergent features at this time; however, many disease processes are dynamic:    Please, return to the emergency department if you experience new or worsening symptoms, fever, chest pain, shortness of breath, difficulty breathing, dizziness, abdominal pain, persistent nausea/vomiting, syncope or passing out, blood in your urine or stool, coughing up blood, leg swelling/pain, urinary retention, bowel or bladder incontinence, numbness between your legs.    Additionally, your blood pressure was measured to be high. This is something that you should discuss with your primary care provider and have re-checked within one week.

## 2025-03-13 ENCOUNTER — OFFICE VISIT (OUTPATIENT)
Dept: NEUROSURGERY | Facility: CLINIC | Age: 40
End: 2025-03-13
Payer: COMMERCIAL

## 2025-03-13 VITALS
TEMPERATURE: 98.1 F | HEIGHT: 62 IN | HEART RATE: 69 BPM | BODY MASS INDEX: 23 KG/M2 | SYSTOLIC BLOOD PRESSURE: 120 MMHG | DIASTOLIC BLOOD PRESSURE: 80 MMHG | OXYGEN SATURATION: 96 % | WEIGHT: 125 LBS

## 2025-03-13 DIAGNOSIS — R51.9 HEADACHE: ICD-10-CM

## 2025-03-13 DIAGNOSIS — Z86.69 HISTORY OF CHIARI MALFORMATION: ICD-10-CM

## 2025-03-13 DIAGNOSIS — G95.0 SYRINGOMYELIA (HCC): Primary | ICD-10-CM

## 2025-03-13 PROCEDURE — 99213 OFFICE O/P EST LOW 20 MIN: CPT | Performed by: NEUROLOGICAL SURGERY

## 2025-03-13 RX ORDER — METHYLPREDNISOLONE 4 MG/1
TABLET ORAL
Qty: 1 EACH | Refills: 1 | Status: SHIPPED | OUTPATIENT
Start: 2025-03-13

## 2025-03-13 NOTE — PROGRESS NOTES
Name: Ary Lemus      : 1985      MRN: [id number]  Encounter Provider: Garry Arias MD  Encounter Date: 3/13/2025   Encounter department: [facility] [place]  :  Assessment & Plan  Syringomyelia (HCC)  This is unchanged in nature.  It is presumed to have been caused by her Chiari malformation which has been adequately decompressed.  She has no pseudomeningocele.  A fourth ventricular to subarachnoid shunt is in place and appears to be working.  Orders:    MRI cervical spine without contrast; Future    methylPREDNISolone 4 MG tablet therapy pack; Use as directed on package    History of Chiari malformation  This has been adequately decompressed and there is no complication from the surgical decompression that is observed.  Orders:    MRI cervical spine without contrast; Future    methylPREDNISolone 4 MG tablet therapy pack; Use as directed on package    Headache  She has a headache syndrome which is probably a combination of small changes in the syrinx versus cervical spondylosis.  A Medrol Dosepak during periods of exacerbation may be helpful for small periods of time.  Orders:    methylPREDNISolone 4 MG tablet therapy pack; Use as directed on package        History of Present Illness     Ary Lemus is a 39 y.o. female who presents As follows:    Neck pain radiating into the arms and hands into the mid back area  Numbness in hands and fingertips  Daily migranes.  More pressure in the head  Occasional dizziness.  Weakness in both arms left more than right  No PT   No EDSI  Headaches began in the cervical spine and radiate to the occipital cervical junction.  These go through periods of exacerbation which is currently happening to her.         Review of Systems   Constitutional: Negative.    HENT: Negative.     Eyes: Negative.    Respiratory: Negative.     Cardiovascular: Negative.    Gastrointestinal: Negative.    Endocrine: Negative.    Genitourinary:  Negative.    Musculoskeletal:  Positive for neck pain and neck stiffness.        C/O: Posterior neck pain radiates to shoulders/arms/hands bilateral and into mid back area, Numbness - bilateral hands/fingertips - more frequent, Weakness in b/l arms L>R; headaches - daily migraines, pressure in head (pt states pressure makes her ears throb); dizzy occ. Pain 8/10.    [id number] - MRI C-Spine   PT: no  BETSY: no  SX: Chiari surgery by Dr. Alexander at [facility] in .   SX: Anterior cervical discectomy and total disc arthroplasty C5-6 (Spine Cervical) x DKO 2019  SX: REPEAT SUBOCCIPITAL CRANIECTOMY; C1 LAMINECTOMY AND DURAPLASTY; PLACEMENT OF 4TH VENTRICULAR-SUBARACHNOID SHUNT; IMPULSE MONITORING (Head) x DKO 2016  AntiCoag: No AC  Smoker: Nonsmoker   Skin: Negative.    Allergic/Immunologic: Negative.    Neurological:  Positive for dizziness, weakness, numbness and headaches.   Hematological: Negative.    Psychiatric/Behavioral:  Positive for sleep disturbance.      I have personally reviewed the MA's review of systems and made changes as necessary.    Past Medical History   Past Medical History:   Diagnosis Date    Abnormal Pap smear of cervix     Chiari I malformation (HCC)     Kidney stone     UTI (urinary tract infection)     treated with antibiotics recently will have ua repeated     Varicella     childhood     Past Surgical History:   Procedure Laterality Date    CERVICAL BIOPSY  W/ LOOP ELECTRODE EXCISION      COLPOSCOPY      DC CONIZATION CERVIX W/WO D&C RPR ELTRD EXC N/A 2020    Procedure: BIOPSY LEEP CERVIX;  Surgeon: Stephanie Swanson MD;  Location: [facility];  Service: Gynecology    DC CRNEC SUBOCCIPITAL CRV TA DCMPRN MEDULLA & CORD N/A 2016    Procedure: REPEAT SUBOCCIPITAL CRANIECTOMY; C1 LAMINECTOMY AND DURAPLASTY; PLACEMENT OF 4TH VENTRICULAR-SUBARACHNOID SHUNT; IMPULSE MONITORING;  Surgeon: Garry Arias MD;  Location: [facility];  Service:  "Neurosurgeryx3    MT LAPAROSCOPY W/RMVL ADNEXAL STRUCTURES Bilateral 5/29/2020    Procedure: SALPINGECTOMY, LAPAROSCOPIC BILATERAL;  Surgeon: Stephanie Swanson MD;  Location: [facility];  Service: Gynecology    MT TOTAL DISC ARTHRP ANT SINGLE INTERSPACE CERVICAL N/A 1/25/2019    Procedure: Anterior cervical discectomy and total disc arthroplasty C5-6;  Surgeon: Garry Arias MD;  Location: [facility];  Service: Neurosurgery    TUBAL LIGATION  06/2020     Family History   Problem Relation Age of Onset    Other Mother         sleep apnea    Hypertension Father     Drug abuse Father     Alcohol abuse Father     No Known Problems Sister     Asthma Son     Heart disease Maternal Grandmother     Diabetes Paternal Grandmother         type 2    Arthritis Paternal Grandmother     No Known Problems Daughter     Stroke Maternal Grandfather     Heart disease Maternal Grandfather     Diabetes Paternal Grandfather         type 2    Heart disease Paternal Grandfather     No Known Problems Sister     Heart disease Sister         failure    Heart disease Maternal Aunt      she reports that she has never smoked. She has never used smokeless tobacco. She reports current alcohol use. She reports that she does not use drugs.  Current Outpatient Medications   Medication Instructions    methylPREDNISolone 4 MG tablet therapy pack Use as directed on package    methylPREDNISolone 4 MG tablet therapy pack Use as directed on package    pseudoephedrine-guaifenesin (MUCINEX D)  MG per tablet 1 tablet, Oral, Every 12 hours     Allergies   Allergen Reactions    Cephalexin      Annotation - 03Jan2017: gives hives    Sunscreens Hives      Objective   /80 (BP Location: Right arm, Patient Position: Sitting, Cuff Size: Adult)   Pulse 69   Temp 98.1 °F (36.7 °C) (Temporal)   Ht 5' 2\" (1.575 m)   Wt 56.7 kg (125 lb)   LMP 02/28/2025   SpO2 96%   BMI 22.86 kg/m²     Physical Exam  Vitals and nursing note reviewed. "   Constitutional:       General: She is not in acute distress.     Appearance: Normal appearance. She is not ill-appearing, toxic-appearing or diaphoretic.   HENT:      Head: Normocephalic.      Nose: Nose normal.   Eyes:      Extraocular Movements: Extraocular movements intact.      Pupils: Pupils are equal, round, and reactive to light.   Musculoskeletal:         General: No swelling, tenderness, deformity or signs of injury. Normal range of motion.      Cervical back: Normal range of motion and neck supple. No rigidity.      Right lower leg: No edema.      Left lower leg: No edema.   Lymphadenopathy:      Cervical: No cervical adenopathy.   Skin:     General: Skin is warm and dry.      Coloration: Skin is not jaundiced.      Findings: No erythema or rash.   Neurological:      Mental Status: She is alert and oriented to person, place, and time.      Cranial Nerves: No cranial nerve deficit.      Sensory: Sensory deficit (Some very minor sensory changes of approximately 70 to 75% reduction in the C7 distribution on the left side.) present.      Motor: No weakness.      Coordination: Coordination normal.      Gait: Gait (She is able to perform tandem gait for 6 steps without difficulty) normal.      Deep Tendon Reflexes: Reflexes normal (Deep tendon reflexes are reduced.  She has no hyperreflexia.).   Psychiatric:         Mood and Affect: Mood normal.         Behavior: Behavior normal.         Thought Content: Thought content normal.         Judgment: Judgment normal.       Neurological Exam  Mental Status  Alert. Oriented to person, place, and time.    Cranial Nerves  CN III, IV, VI: Extraocular movements intact bilaterally. Pupils equal round and reactive to light bilaterally.    Gait   Normal gait (She is able to perform tandem gait for 6 steps without difficulty).      Radiology Results Review: I personally reviewed the following image studies in PACS and associated radiology reports: CT head and MRI spine. My  interpretation of the radiology images/reports is: MRI of the cervical spine is carefully reviewed.  This demonstrates fluid inside the spinal cord.  This is approximately the same as it has been since 18 February 2022 and unchanged and 20 February 2023.  There is some spondylosis below a C5-6 arthroplasty which also appears to be unchanged in nature.  There is a very mild reversal of the normal lordotic curve which is also unchanged..

## 2025-03-13 NOTE — ASSESSMENT & PLAN NOTE
This has been adequately decompressed and there is no complication from the surgical decompression that is observed.  Orders:    MRI cervical spine without contrast; Future    methylPREDNISolone 4 MG tablet therapy pack; Use as directed on package

## 2025-03-13 NOTE — ASSESSMENT & PLAN NOTE
This is unchanged in nature.  It is presumed to have been caused by her Chiari malformation which has been adequately decompressed.  She has no pseudomeningocele.  A fourth ventricular to subarachnoid shunt is in place and appears to be working.  Orders:    MRI cervical spine without contrast; Future    methylPREDNISolone 4 MG tablet therapy pack; Use as directed on package

## (undated) DEVICE — SUT SILK 2-0 18 IN A185H

## (undated) DEVICE — PVC URETHRAL CATHETER: Brand: DOVER

## (undated) DEVICE — Device

## (undated) DEVICE — SNAP KOVER: Brand: UNBRANDED

## (undated) DEVICE — DRAPE SHEET X-LG

## (undated) DEVICE — SMOKE EVACUATION TUBING WITH 8 IN INTEGRAL WAND AND SPONGE GUARD: Brand: BUFFALO FILTER

## (undated) DEVICE — INTENDED FOR TISSUE SEPARATION, AND OTHER PROCEDURES THAT REQUIRE A SHARP SURGICAL BLADE TO PUNCTURE OR CUT.: Brand: BARD-PARKER SAFETY BLADES SIZE 11, STERILE

## (undated) DEVICE — GLOVE INDICATOR PI UNDERGLOVE SZ 8.5 BLUE

## (undated) DEVICE — BETHLEHEM UNIVERSAL SPINE, KIT: Brand: CARDINAL HEALTH

## (undated) DEVICE — PENCIL ELECTROSURG E-Z CLEAN -0035H

## (undated) DEVICE — DRAPE EQUIPMENT RF WAND

## (undated) DEVICE — INSTRUMENT 874-445 ML 9X11MMDP CTTR 5MM: Brand: MEDTRONIC REUSABLE INSTRUMENT

## (undated) DEVICE — ROSEBUD DISSECTORS: Brand: DEROYAL

## (undated) DEVICE — TUBING SMOKE EVAC W/FILTRATION DEVICE PLUMEPORT ACTIV

## (undated) DEVICE — PLASTIC ADHESIVE BANDAGE: Brand: CURITY

## (undated) DEVICE — GLOVE PI ULTRA TOUCH SZ.7.0

## (undated) DEVICE — 3M™ STERI-STRIP™ REINFORCED ADHESIVE SKIN CLOSURES, R1542, 1/4 IN X 1-1/2 IN (6 MM X 38 MM), 6 STRIPS/ENVELOPE: Brand: 3M™ STERI-STRIP™

## (undated) DEVICE — TIBURON SPLIT SHEET: Brand: CONVERTORS

## (undated) DEVICE — DRAPE MICROSCOPE OPMI PENTERO

## (undated) DEVICE — SYRINGE 10ML LL

## (undated) DEVICE — FLOSEAL HEMOSTATIC MATRIX, 5 ML: Brand: FLOSEAL

## (undated) DEVICE — ADHESIVE SKIN HIGH VISCOSITY EXOFIN 1ML

## (undated) DEVICE — TOOL 14MH30 LEGEND 14CM 3MM: Brand: MIDAS REX ™

## (undated) DEVICE — NEEDLE SPINAL 22G X 3.5IN  QUINCKE

## (undated) DEVICE — INTENDED FOR TISSUE SEPARATION, AND OTHER PROCEDURES THAT REQUIRE A SHARP SURGICAL BLADE TO PUNCTURE OR CUT.: Brand: BARD-PARKER ® CARBON RIB-BACK BLADES

## (undated) DEVICE — SUT VICRYL PLUS 3-0 RB-1 CR/8 18 IN VCP713D

## (undated) DEVICE — LEEP LOOP ELECTRODE WIDE 20 X 15

## (undated) DEVICE — GLOVE INDICATOR PI UNDERGLOVE SZ 7 BLUE

## (undated) DEVICE — PREP SURGICAL PURPREP 26ML

## (undated) DEVICE — SPONGE PVP SCRUB WING STERILE

## (undated) DEVICE — ELECTRODE BLADE MOD E-Z CLEAN 2.5IN 6.4CM -0012M

## (undated) DEVICE — ALL PURPOSE SPONGES,NONWOVEN, 4 PLY: Brand: CURITY

## (undated) DEVICE — LIGHT HANDLE COVER SLEEVE DISP BLUE STELLAR

## (undated) DEVICE — ELECTRODE BALL E-Z CLEAN 2IN -0015

## (undated) DEVICE — CHLORAPREP HI-LITE 26ML ORANGE

## (undated) DEVICE — INTENDED FOR TISSUE SEPARATION, AND OTHER PROCEDURES THAT REQUIRE A SHARP SURGICAL BLADE TO PUNCTURE OR CUT.: Brand: BARD-PARKER SAFETY BLADES SIZE 15, STERILE

## (undated) DEVICE — SUT MONOCRYL 4-0 PS-2 18 IN Y496G

## (undated) DEVICE — STERILE 8 INCH PROCTO SWAB: Brand: CARDINAL HEALTH

## (undated) DEVICE — BETHLEHEM UNIVERSAL GYN LAP PK: Brand: CARDINAL HEALTH

## (undated) DEVICE — ENSEAL LAPAROSCOPIC TISSUE SEALER G2 CURVED JAW FOR USE WITH G2 GENERATOR 5MM DIAMETER 35CM SHAFT LENGTH: Brand: ENSEAL

## (undated) DEVICE — TROCAR: Brand: KII® SLEEVE

## (undated) DEVICE — PAD GROUNDING ADULT

## (undated) DEVICE — BIT 6972118 RAIL CUTTER 1.5MM DIAMOND

## (undated) DEVICE — GLOVE SRG BIOGEL 8

## (undated) DEVICE — LAPAROSCOPIC TROCAR SLEEVE/SINGLE USE: Brand: KII® OPTICAL ACCESS SYSTEM